# Patient Record
Sex: FEMALE | Race: BLACK OR AFRICAN AMERICAN | Employment: UNEMPLOYED | URBAN - METROPOLITAN AREA
[De-identification: names, ages, dates, MRNs, and addresses within clinical notes are randomized per-mention and may not be internally consistent; named-entity substitution may affect disease eponyms.]

---

## 2017-02-13 ENCOUNTER — OFFICE VISIT (OUTPATIENT)
Dept: FAMILY MEDICINE CLINIC | Facility: CLINIC | Age: 52
End: 2017-02-13

## 2017-02-13 ENCOUNTER — TELEPHONE (OUTPATIENT)
Dept: FAMILY MEDICINE CLINIC | Facility: CLINIC | Age: 52
End: 2017-02-13

## 2017-02-13 VITALS
BODY MASS INDEX: 31 KG/M2 | RESPIRATION RATE: 18 BRPM | TEMPERATURE: 98 F | DIASTOLIC BLOOD PRESSURE: 84 MMHG | SYSTOLIC BLOOD PRESSURE: 125 MMHG | HEART RATE: 89 BPM | WEIGHT: 189 LBS

## 2017-02-13 DIAGNOSIS — J01.00 ACUTE MAXILLARY SINUSITIS, RECURRENCE NOT SPECIFIED: Primary | ICD-10-CM

## 2017-02-13 PROCEDURE — 99213 OFFICE O/P EST LOW 20 MIN: CPT | Performed by: FAMILY MEDICINE

## 2017-02-13 PROCEDURE — 99212 OFFICE O/P EST SF 10 MIN: CPT | Performed by: FAMILY MEDICINE

## 2017-02-13 RX ORDER — BENZONATATE 200 MG/1
200 CAPSULE ORAL 3 TIMES DAILY PRN
Qty: 30 CAPSULE | Refills: 0 | Status: SHIPPED | OUTPATIENT
Start: 2017-02-13 | End: 2017-03-18

## 2017-02-13 RX ORDER — FLUCONAZOLE 150 MG/1
150 TABLET ORAL ONCE
Qty: 2 TABLET | Refills: 0 | Status: SHIPPED | OUTPATIENT
Start: 2017-02-13 | End: 2017-02-13

## 2017-02-13 RX ORDER — ZOLPIDEM TARTRATE 5 MG/1
5 TABLET ORAL NIGHTLY PRN
Qty: 30 TABLET | Refills: 0 | Status: SHIPPED
Start: 2017-02-13 | End: 2019-06-22

## 2017-02-13 RX ORDER — TRAMADOL HYDROCHLORIDE 50 MG/1
50 TABLET ORAL EVERY 6 HOURS PRN
COMMUNITY
End: 2017-03-18

## 2017-02-13 RX ORDER — AMOXICILLIN AND CLAVULANATE POTASSIUM 875; 125 MG/1; MG/1
1 TABLET, FILM COATED ORAL 2 TIMES DAILY
Qty: 20 TABLET | Refills: 0 | Status: SHIPPED | OUTPATIENT
Start: 2017-02-13 | End: 2017-02-23

## 2017-02-13 RX ORDER — TRAMADOL HYDROCHLORIDE 50 MG/1
50 TABLET ORAL EVERY 6 HOURS PRN
Qty: 30 TABLET | Refills: 0 | OUTPATIENT
Start: 2017-02-13 | End: 2017-03-18

## 2017-02-13 NOTE — PROGRESS NOTES
HPI: Marcellus Chance is a 46year old female who presents for one week of pharyngitis, hoarseness, sinus pressure and pain. Has hot flashes and chills. Tmax- 101. Taking sinus medication and OTC flu relief without any relief. No vomiting or diarrhea.  Decreased 05/01/2001  HEENT: Conjunctive clear. Prem ear canals clear. Perm TMs intact with good landmarks noted. Nares patent. Moderate nasal turbinate hypertrophy noted. Tenderness noted to frontal sinus and right maxillary sinus.   Oral mucous membrane moist.  No

## 2017-03-03 ENCOUNTER — TELEPHONE (OUTPATIENT)
Dept: FAMILY MEDICINE CLINIC | Facility: CLINIC | Age: 52
End: 2017-03-03

## 2017-03-03 NOTE — TELEPHONE ENCOUNTER
Pt returned call. Reviewed prescription information per doctor's instructions. Pt agreed with plan of care and will call back if no improvement in symptoms.

## 2017-03-03 NOTE — TELEPHONE ENCOUNTER
Dr. Rahul Campbell: Patient would like to know if you can prescribe medication for hot flashes. She states her hot flashes are worse now; unable to sleep, has sweats; unbearable. She states black Cohosh no longer helps her.   She has tried black cohosh in past when sh

## 2017-03-03 NOTE — TELEPHONE ENCOUNTER
Pt calling regarding what can she do for her hot flashes. Pt would like to know if medication can be prescribed. .. please advise

## 2017-03-16 ENCOUNTER — TELEPHONE (OUTPATIENT)
Dept: FAMILY MEDICINE CLINIC | Facility: CLINIC | Age: 52
End: 2017-03-16

## 2017-03-16 DIAGNOSIS — Z84.81 FAMILY HISTORY OF BREAST CANCER GENE MUTATION IN FIRST DEGREE RELATIVE: Primary | ICD-10-CM

## 2017-03-16 NOTE — TELEPHONE ENCOUNTER
Pt states medication that was prescribed High blood pressure patches is causing her Nausea, vomiting,Fatigue, Blurred vision,dry mouth,and joint pain. Pt states she took off patch about an hour ago, hoping it will make her feel better. Please advise.

## 2017-03-16 NOTE — TELEPHONE ENCOUNTER
Actions Requested: pt experiencing side effects of Clonidine patch, pt declined recommendations to go to Urgent Care  Situation/Background   Problem: pt states she has side effects from Clonidine patch prescribed for hot flashes   Onset: 1 week after miki

## 2017-03-16 NOTE — TELEPHONE ENCOUNTER
Spoke with patient and I agree with discontinue of clonidine patch secondary to the myriad of side effects. Patient advised to call first thing in the morning if symptoms have not lessened. Patient understood and agreed to plan.

## 2017-03-17 NOTE — TELEPHONE ENCOUNTER
Great! Hold on any therapy for now. I will consult with gyne and see what the best option is. In the meantime ask if there is any 1st degree breast CA history in her family.

## 2017-03-17 NOTE — TELEPHONE ENCOUNTER
Pt states she is feeling a lot better, symptoms have lessened. Pt is asking if she should estrogen for her hot flashes, states she is still having really bad hot flashes. Please advise.

## 2017-03-18 ENCOUNTER — OFFICE VISIT (OUTPATIENT)
Dept: INTERNAL MEDICINE CLINIC | Facility: CLINIC | Age: 52
End: 2017-03-18

## 2017-03-18 VITALS
WEIGHT: 189 LBS | HEART RATE: 67 BPM | TEMPERATURE: 98 F | SYSTOLIC BLOOD PRESSURE: 120 MMHG | RESPIRATION RATE: 18 BRPM | HEIGHT: 65 IN | BODY MASS INDEX: 31.49 KG/M2 | DIASTOLIC BLOOD PRESSURE: 82 MMHG

## 2017-03-18 DIAGNOSIS — E78.5 HYPERLIPIDEMIA, UNSPECIFIED HYPERLIPIDEMIA TYPE: Primary | ICD-10-CM

## 2017-03-18 DIAGNOSIS — Z00.00 PHYSICAL EXAM, ROUTINE: ICD-10-CM

## 2017-03-18 DIAGNOSIS — Z12.39 SCREENING FOR BREAST CANCER: ICD-10-CM

## 2017-03-18 DIAGNOSIS — N89.8 VAGINAL DISCHARGE: ICD-10-CM

## 2017-03-18 LAB
APPEARANCE: CLEAR
CHOLEST SERPL-MCNC: 184 MG/DL (ref 110–200)
HDLC SERPL-MCNC: 53 MG/DL
LDLC SERPL CALC-MCNC: 119 MG/DL (ref 0–99)
MULTISTIX LOT#: NORMAL NUMERIC
NONHDLC SERPL-MCNC: 131 MG/DL
PH, URINE: 5.5 (ref 4.5–8)
SPECIFIC GRAVITY: 1.02 (ref 1–1.03)
TRIGL SERPL-MCNC: 59 MG/DL (ref 1–149)
URINE-COLOR: YELLOW
UROBILINOGEN,SEMI-QN: 0.2 MG/DL (ref 0–1.9)

## 2017-03-18 PROCEDURE — 99386 PREV VISIT NEW AGE 40-64: CPT | Performed by: INTERNAL MEDICINE

## 2017-03-18 PROCEDURE — 81002 URINALYSIS NONAUTO W/O SCOPE: CPT | Performed by: INTERNAL MEDICINE

## 2017-03-18 PROCEDURE — 36415 COLL VENOUS BLD VENIPUNCTURE: CPT | Performed by: INTERNAL MEDICINE

## 2017-03-18 RX ORDER — POTASSIUM CHLORIDE 20 MEQ/1
TABLET, EXTENDED RELEASE ORAL
Refills: 0 | Status: CANCELLED | OUTPATIENT
Start: 2017-03-18

## 2017-03-18 RX ORDER — ZOLPIDEM TARTRATE 5 MG/1
5 TABLET ORAL NIGHTLY PRN
Qty: 30 TABLET | Refills: 0 | Status: CANCELLED | OUTPATIENT
Start: 2017-03-18

## 2017-03-18 RX ORDER — PANTOPRAZOLE SODIUM 40 MG/1
40 TABLET, DELAYED RELEASE ORAL
Qty: 90 TABLET | Refills: 2 | Status: SHIPPED | OUTPATIENT
Start: 2017-03-18 | End: 2017-08-31

## 2017-03-18 RX ORDER — METRONIDAZOLE 500 MG/1
500 TABLET ORAL 2 TIMES DAILY
Qty: 14 TABLET | Refills: 0 | Status: SHIPPED | OUTPATIENT
Start: 2017-03-18 | End: 2017-03-25

## 2017-03-18 RX ORDER — TRAMADOL HYDROCHLORIDE 50 MG/1
50 TABLET ORAL EVERY 8 HOURS PRN
Qty: 30 TABLET | Refills: 0 | Status: SHIPPED | OUTPATIENT
Start: 2017-03-18 | End: 2018-02-02

## 2017-03-18 NOTE — PATIENT INSTRUCTIONS
Prevention Guidelines, Women Ages 48 to 59  Screening tests and vaccines are an important part of managing your health. Health counseling is essential, too. Below are guidelines for these, for women ages 48 to 59.  Talk with your healthcare provider to ma Lung cancer Adults age 54 to [de-identified] who have smoked Yearly screening in smokers with 30 pack-year history of smoking or who quit within 15 years   Obesity All women in this age group At routine exams   Osteoporosis Women who are postmenopausal Ask your healthc PPSV23: 1 to 2 doses through age 59, or 1 dose at 72 or older (protects against 23 types of pneumococcal bacteria)   Tetanus/diphtheria/pertussis (Td/Tdap) booster All women in this age group Td every 10 years, or a one-time dose of Tdap instead of a Td kenji

## 2017-03-18 NOTE — TELEPHONE ENCOUNTER
Pt advised  to hold therapy for now. Pt reports that her grandmother, mother and sister all had breast cancer.

## 2017-03-18 NOTE — PROGRESS NOTES
HPI:   Wilber Segal is a 46year old female who presents for a complete physical exam. Symptoms:she complains of vaginal -discharge for last few weeks, white /yellow in color , not associated with itchiness. She is sexually active with one partner  She dos Oral Chew Tab Chew 81 mg by mouth daily. Disp:  Rfl:    aspirin-acetaminophen-caffeine (EXCEDRIN MIGRAINE) 250-250-65 MG Oral Tab Take 1 tablet by mouth every 6 (six) hours as needed for Pain.  Disp:  Rfl:       Past Medical History   Diagnosis Date   • Satish Dysuria, hematuria, urinary incontinence. Menses regular, not heavy. Endocrine Negative Cold intolerance and heat intolerance. Neuro Negative Dizziness, extremity weakness, headache, memory impairment, numbness in extremities, seizures and tremors.    P ASSESSMENT AND PLAN:   Delta Bernstein is a 46year old female who presents for a complete physical exam.  Self breast exam explained.      Insomnia - advised to stop using ambien, discussed about relaxation techniques, she can use melatonin as needed

## 2017-03-19 NOTE — TELEPHONE ENCOUNTER
You can call patient and asked if she was advised to get BRCA testing in lieu of 1st degree relative CA and if so what was the result.

## 2017-03-20 LAB
GENITAL VAGINOSIS SCREEN: POSITIVE
TRICHOMONAS SCREEN: NEGATIVE

## 2017-03-20 NOTE — TELEPHONE ENCOUNTER
FJRPlease advise follow up therapy for menopausal hot flashes, further testing or referral    Patient discontinued Clonidine patch 5 days ago due to side effects.  Continues to experience body aches, shoulders, legs 8/10 relieved with otc 'powder BC'  There

## 2017-03-20 NOTE — TELEPHONE ENCOUNTER
Referral on chart for genetic testing regarding family history of breast CA. Call patient. There is another non hormonal medication that we can try but I highly suggest she sees genetic counselor prior to any other therapy.

## 2017-08-30 ENCOUNTER — TELEPHONE (OUTPATIENT)
Dept: GASTROENTEROLOGY | Facility: CLINIC | Age: 52
End: 2017-08-30

## 2017-08-30 DIAGNOSIS — R10.10 PAIN OF UPPER ABDOMEN: Primary | ICD-10-CM

## 2017-08-30 NOTE — TELEPHONE ENCOUNTER
Dr Dickinson--pt contacted. Has had bloating, nausea, reflux for past 3 weeks--also ran out of pantoprazole 3 weeks ago, but did not call. You did egd/flex sig 5/18/15 found hemorrhoids, decreased vascular markings in sigmoid/rectal area, mild gastritis.  Norm

## 2017-08-30 NOTE — TELEPHONE ENCOUNTER
Pt states that she feels full and bloated after eating and sometimes even after drinking water. Pt states she gets nauseous also. Pt would like to know when she is due for next procedure. Please call.

## 2017-08-31 RX ORDER — PANTOPRAZOLE SODIUM 40 MG/1
40 TABLET, DELAYED RELEASE ORAL
Qty: 90 TABLET | Refills: 2 | Status: SHIPPED | OUTPATIENT
Start: 2017-08-31 | End: 2018-07-10

## 2017-08-31 NOTE — TELEPHONE ENCOUNTER
The patient was contacted and we discussed her symptoms. She has been experiencing nausea, reflux disease and bloating. She ran out of her Protonix about 3 weeks ago and that is when the symptoms seem to start up.   She does take Excedrin Migraine quite r

## 2017-09-01 ENCOUNTER — TELEPHONE (OUTPATIENT)
Dept: GASTROENTEROLOGY | Facility: CLINIC | Age: 52
End: 2017-09-01

## 2017-09-01 NOTE — TELEPHONE ENCOUNTER
LMTCB - all I see us a gallbladder US ordered not performed    -no notes that pt has seen a surgeon  -time off work would need to come from surgeon actually doing the surgery, not GI referring provider  -please inform pt to do [address] as ordered and she can dis

## 2017-09-01 NOTE — TELEPHONE ENCOUNTER
Pt called back. Has ultrasound of GB scheduled on 9/16. Again wants to know If Dr Anahi Mitchell decides to do surgery how long it would it take.   I reviewed Dr Anahi Mitchell is not a surgeon and he would be referring her to a general surgeon to have this done IF necessary

## 2017-11-18 ENCOUNTER — HOSPITAL ENCOUNTER (OUTPATIENT)
Age: 52
Discharge: HOME | End: 2017-11-18
Attending: PEDIATRICS
Payer: COMMERCIAL

## 2017-11-18 VITALS
WEIGHT: 192 LBS | HEART RATE: 106 BPM | RESPIRATION RATE: 20 BRPM | TEMPERATURE: 98 F | OXYGEN SATURATION: 98 % | DIASTOLIC BLOOD PRESSURE: 87 MMHG | HEIGHT: 65 IN | BODY MASS INDEX: 31.99 KG/M2 | SYSTOLIC BLOOD PRESSURE: 140 MMHG

## 2017-11-18 DIAGNOSIS — J01.90 ACUTE SINUSITIS, RECURRENCE NOT SPECIFIED, UNSPECIFIED LOCATION: Primary | ICD-10-CM

## 2017-11-18 DIAGNOSIS — G43.809 OTHER MIGRAINE WITHOUT STATUS MIGRAINOSUS, NOT INTRACTABLE: ICD-10-CM

## 2017-11-18 PROCEDURE — 99213 OFFICE O/P EST LOW 20 MIN: CPT

## 2017-11-18 PROCEDURE — 99204 OFFICE O/P NEW MOD 45 MIN: CPT

## 2017-11-18 RX ORDER — TRAMADOL HYDROCHLORIDE 50 MG/1
TABLET ORAL EVERY 4 HOURS PRN
Qty: 10 TABLET | Refills: 0 | Status: SHIPPED | OUTPATIENT
Start: 2017-11-18 | End: 2017-11-25

## 2017-11-18 RX ORDER — AMOXICILLIN AND CLAVULANATE POTASSIUM 875; 125 MG/1; MG/1
1 TABLET, FILM COATED ORAL 2 TIMES DAILY
Qty: 20 TABLET | Refills: 0 | Status: SHIPPED | OUTPATIENT
Start: 2017-11-18 | End: 2017-11-28

## 2017-11-18 RX ORDER — FLUCONAZOLE 150 MG/1
150 TABLET ORAL ONCE
Qty: 2 TABLET | Refills: 0 | Status: SHIPPED | OUTPATIENT
Start: 2017-11-18 | End: 2017-11-18

## 2017-11-18 NOTE — ED PROVIDER NOTES
Patient presents with:  Cough/URI      HPI:     Megan Serrano is a 46year old female who presents for evaluation of a chief complaint of upper respiratory symptoms for over a week. She notes some cough, headache, face pain.   She notes green nasal disch (DIFLUCAN) 150 MG Oral Tab          Sig: Take 1 tablet (150 mg total) by mouth once.  May repeat once 3 days later          Dispense:  2 tablet          Refill:  0      TraMADol HCl 50 MG Oral Tab          Sig: Take 1-2 tablets ( mg total) by mouth ev

## 2017-11-18 NOTE — ED NOTES
Pt given discharge instructions and prescriptions, verbalizes understanding. Pt instructed that pain medication may cause drowsiness and pt is not to drive, drink alcohol, or participate in activities requiring full attention, verbalizes understanding.  Lobo Zamora

## 2017-11-18 NOTE — ED NOTES
Pt reports headache and productive cough for the last several days. Reports frontal headache is about 10/10. Has not had any over the counter pain medication today. Denies fevers, nausea, vomiting.  Reports sore throat earlier this week but states it has im

## 2017-12-08 RX ORDER — SIMVASTATIN 40 MG
40 TABLET ORAL NIGHTLY
Qty: 90 TABLET | Refills: 0 | Status: SHIPPED | OUTPATIENT
Start: 2017-12-08 | End: 2018-04-02

## 2017-12-08 NOTE — TELEPHONE ENCOUNTER
Chart reviewed, simvastatin 40 mg refilled for 3 months per [facility] refill protocol.     Cholesterol Medications  Protocol Criteria:  · Appointment scheduled in the past 12 months or in the next 3 months  · ALT & LDL on file in the past 12 months  · ALT result

## 2018-01-18 ENCOUNTER — TELEPHONE (OUTPATIENT)
Dept: INTERNAL MEDICINE CLINIC | Facility: CLINIC | Age: 53
End: 2018-01-18

## 2018-01-18 RX ORDER — NAPROXEN 500 MG/1
500 TABLET ORAL 2 TIMES DAILY WITH MEALS
Qty: 30 TABLET | Refills: 0 | Status: SHIPPED | OUTPATIENT
Start: 2018-01-18 | End: 2019-02-11

## 2018-01-18 NOTE — TELEPHONE ENCOUNTER
Spoke with Dr. Es Lisa has not seen patient since March 2017. Would like patient to try Naproxen 500 mg twice a day alternate with Excedrin  If not improvement should go to ER.

## 2018-01-18 NOTE — TELEPHONE ENCOUNTER
Spoke with patient informed her of Dr. Ribeiro Lesser response to try Naproxen 500 mg twice a day alternate with Excedrin. If not improvement should go to ER.   Patient states had tried Naproxen already she had Rx from dental work few months ago, and no relief wi

## 2018-01-18 NOTE — TELEPHONE ENCOUNTER
Patient called c/o Migrane headaches daily on and off for past week. Was hoping to wait until appt on Saturday 1/20/18  Headache is 8 on pain scale and excedrin migrane not helping at all. Asking for refill on Tramadol   Has appt 1/20  Please advise.

## 2018-01-23 ENCOUNTER — NURSE TRIAGE (OUTPATIENT)
Dept: OTHER | Age: 53
End: 2018-01-23

## 2018-01-23 RX ORDER — ZOLPIDEM TARTRATE 10 MG/1
10 TABLET ORAL NIGHTLY PRN
Qty: 30 TABLET | Refills: 0 | Status: SHIPPED
Start: 2018-01-23 | End: 2018-04-24

## 2018-01-23 NOTE — TELEPHONE ENCOUNTER
Action Requested: Summary for Provider     []  Critical Lab, Recommendations Needed  [x] Need Additional Advice  []   FYI    []   Need Orders  [x] Need Medications Sent to Pharmacy  []  Other     SUMMARY: pt asking if doctor can prescribe something to help

## 2018-02-02 ENCOUNTER — NURSE TRIAGE (OUTPATIENT)
Dept: OTHER | Age: 53
End: 2018-02-02

## 2018-02-02 ENCOUNTER — OFFICE VISIT (OUTPATIENT)
Dept: FAMILY MEDICINE CLINIC | Facility: CLINIC | Age: 53
End: 2018-02-02

## 2018-02-02 VITALS
SYSTOLIC BLOOD PRESSURE: 135 MMHG | DIASTOLIC BLOOD PRESSURE: 77 MMHG | WEIGHT: 199 LBS | RESPIRATION RATE: 17 BRPM | HEIGHT: 65 IN | BODY MASS INDEX: 33.15 KG/M2 | HEART RATE: 80 BPM | TEMPERATURE: 98 F

## 2018-02-02 DIAGNOSIS — N76.0 BACTERIAL VAGINOSIS: ICD-10-CM

## 2018-02-02 DIAGNOSIS — B96.89 BACTERIAL VAGINOSIS: ICD-10-CM

## 2018-02-02 DIAGNOSIS — E78.5 HYPERLIPIDEMIA, UNSPECIFIED HYPERLIPIDEMIA TYPE: ICD-10-CM

## 2018-02-02 DIAGNOSIS — R68.89 FLU-LIKE SYMPTOMS: Primary | ICD-10-CM

## 2018-02-02 LAB
CONTROL LINE PRESENT WITH A CLEAR BACKGROUND (YES/NO): YES YES/NO
KIT LOT #: NORMAL NUMERIC
STREP GRP A CUL-SCR: NEGATIVE

## 2018-02-02 PROCEDURE — 87880 STREP A ASSAY W/OPTIC: CPT | Performed by: FAMILY MEDICINE

## 2018-02-02 PROCEDURE — 99213 OFFICE O/P EST LOW 20 MIN: CPT | Performed by: FAMILY MEDICINE

## 2018-02-02 PROCEDURE — 99212 OFFICE O/P EST SF 10 MIN: CPT | Performed by: FAMILY MEDICINE

## 2018-02-02 RX ORDER — METRONIDAZOLE 500 MG/1
500 TABLET ORAL 2 TIMES DAILY
Qty: 14 TABLET | Refills: 0 | Status: SHIPPED | OUTPATIENT
Start: 2018-02-02 | End: 2018-04-24

## 2018-02-02 RX ORDER — TRAMADOL HYDROCHLORIDE 50 MG/1
50 TABLET ORAL EVERY 6 HOURS PRN
Qty: 30 TABLET | Refills: 1 | Status: SHIPPED | OUTPATIENT
Start: 2018-02-02 | End: 2018-04-09

## 2018-02-02 NOTE — TELEPHONE ENCOUNTER
Action Requested: Summary for Provider     []  Critical Lab, Recommendations Needed  [] Need Additional Advice  []   FYI    []   Need Orders  [] Need Medications Sent to Pharmacy  []  Other     SUMMARY: Appointment made with Dr Heather Echeverria today 2/2/18 at [phone]

## 2018-02-02 NOTE — PROGRESS NOTES
HPI:    Patient ID: Clary Salinas is a 46year old female. Sore Throat    This is a new problem. The current episode started yesterday. The problem has been unchanged. Neither side of throat is experiencing more pain than the other.  There has been no aspirin-acetaminophen-caffeine (EXCEDRIN MIGRAINE) 250-250-65 MG Oral Tab Take 1 tablet by mouth every 6 (six) hours as needed for Pain.  Disp:  Rfl:      Allergies:No Known Allergies   PHYSICAL EXAM:   Physical Exam   Constitutional: She appears well-dev tablet (500 mg total) by mouth 2 (two) times daily. TraMADol HCl 50 MG Oral Tab 30 tablet 1      Sig: Take 1 tablet (50 mg total) by mouth every 6 (six) hours as needed for Pain.            Imaging & Referrals:  None       OI#[id number]

## 2018-02-25 RX ORDER — SIMVASTATIN 40 MG
40 TABLET ORAL NIGHTLY
Qty: 90 TABLET | Refills: 0 | OUTPATIENT
Start: 2018-02-25

## 2018-04-02 RX ORDER — SIMVASTATIN 40 MG
40 TABLET ORAL NIGHTLY
Qty: 90 TABLET | Refills: 0 | Status: SHIPPED | OUTPATIENT
Start: 2018-04-02 | End: 2018-07-10

## 2018-04-03 ENCOUNTER — TELEPHONE (OUTPATIENT)
Dept: FAMILY MEDICINE CLINIC | Facility: CLINIC | Age: 53
End: 2018-04-03

## 2018-04-03 NOTE — TELEPHONE ENCOUNTER
Patient asking if she can be added on a Saturday in April for [facility] PAP with Dr Royce Arthur. Due to her work schedule she can only come in on a Saturday.

## 2018-04-09 ENCOUNTER — NURSE TRIAGE (OUTPATIENT)
Dept: OTHER | Age: 53
End: 2018-04-09

## 2018-04-09 RX ORDER — TRAMADOL HYDROCHLORIDE 50 MG/1
50 TABLET ORAL EVERY 6 HOURS PRN
Qty: 30 TABLET | Refills: 0 | OUTPATIENT
Start: 2018-04-09 | End: 2018-07-10

## 2018-04-09 NOTE — TELEPHONE ENCOUNTER
LMTCB first date available for a 30 min opening with Dr. Kamilah Adame is May 26th. Pt wants a Saturday appt. pls see msg below.

## 2018-04-09 NOTE — TELEPHONE ENCOUNTER
Okay to add if 30 minute appointment available or for 15 minute in the last slot, otherwise offered May appointment

## 2018-04-09 NOTE — TELEPHONE ENCOUNTER
Tramadol 50 mg called Lilli (pharmacist) at [facility].     Left message on patient's voicemail informing her script called into pharmacy.

## 2018-04-09 NOTE — TELEPHONE ENCOUNTER
Per patient, she takes  TraMADol HCl 50 MG Oral Tab 1 tablet every 6 hours as needed for pain.      Dr. Tova Sinclair:  See pended script

## 2018-04-09 NOTE — TELEPHONE ENCOUNTER
Action Requested: Summary for Provider     []  Critical Lab, Recommendations Needed  [] Need Additional Advice  []   FYI    []   Need Orders  [] Need Medications Sent to Pharmacy  []  Other     SUMMARY:    Asking for medication.   Cannot come in for she can

## 2018-04-26 RX ORDER — METRONIDAZOLE 500 MG/1
500 TABLET ORAL 2 TIMES DAILY
Qty: 14 TABLET | Refills: 0 | Status: SHIPPED | OUTPATIENT
Start: 2018-04-26 | End: 2018-07-10

## 2018-04-26 NOTE — TELEPHONE ENCOUNTER
Please advise on refill request.     Recent Outpatient Visits            2 months ago Flu-like symptoms    Nikki Norwood, [facility] MD [place]    Office Visit    1 year ago Hyperlipidemia, unspecified hyperlipidemia type    Galindo OJEDA

## 2018-04-27 RX ORDER — ZOLPIDEM TARTRATE 10 MG/1
TABLET ORAL
Qty: 30 TABLET | Refills: 0 | OUTPATIENT
Start: 2018-04-27 | End: 2019-06-22

## 2018-04-27 NOTE — TELEPHONE ENCOUNTER
LOV: 2-2-18 Last Rx: 1-23-18    Please advise in regards to refill request. Thank You    Please respond to pool: ARIN JONES OPO LPN/CMA

## 2018-07-10 RX ORDER — TRAMADOL HYDROCHLORIDE 50 MG/1
50 TABLET ORAL EVERY 6 HOURS PRN
Qty: 30 TABLET | Refills: 0 | Status: SHIPPED
Start: 2018-07-10 | End: 2019-06-22

## 2018-07-10 RX ORDER — PANTOPRAZOLE SODIUM 40 MG/1
40 TABLET, DELAYED RELEASE ORAL
Qty: 30 TABLET | Refills: 2 | Status: SHIPPED | OUTPATIENT
Start: 2018-07-10 | End: 2019-01-06

## 2018-07-10 RX ORDER — SIMVASTATIN 40 MG
40 TABLET ORAL NIGHTLY
Qty: 30 TABLET | Refills: 2 | Status: SHIPPED | OUTPATIENT
Start: 2018-07-10 | End: 2019-06-22 | Stop reason: DRUGHIGH

## 2018-07-10 RX ORDER — METRONIDAZOLE 500 MG/1
500 TABLET ORAL 2 TIMES DAILY
Qty: 14 TABLET | Refills: 0 | Status: SHIPPED | OUTPATIENT
Start: 2018-07-10 | End: 2018-07-17

## 2018-07-10 NOTE — TELEPHONE ENCOUNTER
Please advise on refill request.   Patient stated headaches have returned. Also complaining of clear discharge with a fishy odor.    Patient has had bacterial vaginosis in the past and is sure she has another infection    Cholesterol Medications  Protocol Office Visit          Refill Protocol Appointment Criteria  · Appointment scheduled in the past 12 months or in the next 3 months  Recent Outpatient Visits            5 months ago Flu-like symptoms    [address] [place] [address], [facility], [place],

## 2018-07-10 NOTE — TELEPHONE ENCOUNTER
Pt called to follow up on Tramadol refill request, states pharmacy hasn't received prescription yet. Noted refill approved today, informed pt prescription will be called in to pharmacy and pharmacy will notify her when ready for .  Tramadol refill in

## 2018-10-08 ENCOUNTER — TELEPHONE (OUTPATIENT)
Dept: GASTROENTEROLOGY | Facility: CLINIC | Age: 53
End: 2018-10-08

## 2018-12-27 NOTE — TELEPHONE ENCOUNTER
Pt is requesting a call from [address] regarding how long would she be out of work if she was to have the gallbladder surgery. Pt is aware PL is out of the office today.  Please Call Thank You No

## 2019-01-07 RX ORDER — PANTOPRAZOLE SODIUM 40 MG/1
TABLET, DELAYED RELEASE ORAL
Qty: 30 TABLET | Refills: 0 | Status: SHIPPED | OUTPATIENT
Start: 2019-01-07 | End: 2019-02-11

## 2019-01-07 NOTE — TELEPHONE ENCOUNTER
No future appointments.       Refill Protocol Appointment Criteria  · Appointment scheduled in the past 12 months or in the next 3 months  Recent Outpatient Visits            11 months ago Flu-like symptoms    [address] [facility]

## 2019-02-07 ENCOUNTER — TELEPHONE (OUTPATIENT)
Dept: GASTROENTEROLOGY | Facility: CLINIC | Age: 54
End: 2019-02-07

## 2019-02-07 NOTE — TELEPHONE ENCOUNTER
Pt states she has been experiencing pain \"in the pit of my stomach\" for two weeks. Pt states it is to the point where she doesn't want to eat.  Please call thank you [phone]

## 2019-02-07 NOTE — TELEPHONE ENCOUNTER
Pt contacted, last seen 6/16/15 for colonoscopy. She now has [facility] --informed the clinic/emh do not accept [place]. Instructed to contact [facility] today and find out which PCP and GI and hospital are in her network.  I will be happy to send GI records t

## 2019-02-11 ENCOUNTER — NURSE TRIAGE (OUTPATIENT)
Dept: FAMILY MEDICINE CLINIC | Facility: CLINIC | Age: 54
End: 2019-02-11

## 2019-02-11 ENCOUNTER — HOSPITAL ENCOUNTER (EMERGENCY)
Age: 54
Discharge: HOME | End: 2019-02-11
Attending: EMERGENCY MEDICINE
Payer: MEDICAID

## 2019-02-11 ENCOUNTER — APPOINTMENT (OUTPATIENT)
Dept: ULTRASOUND IMAGING | Age: 54
End: 2019-02-11
Attending: EMERGENCY MEDICINE
Payer: MEDICAID

## 2019-02-11 VITALS
TEMPERATURE: 98 F | OXYGEN SATURATION: 99 % | BODY MASS INDEX: 31 KG/M2 | RESPIRATION RATE: 16 BRPM | SYSTOLIC BLOOD PRESSURE: 121 MMHG | HEART RATE: 78 BPM | WEIGHT: 188 LBS | DIASTOLIC BLOOD PRESSURE: 71 MMHG

## 2019-02-11 DIAGNOSIS — K21.9 GASTROESOPHAGEAL REFLUX DISEASE, ESOPHAGITIS PRESENCE NOT SPECIFIED: Primary | ICD-10-CM

## 2019-02-11 LAB
ALBUMIN SERPL-MCNC: 3.8 G/DL (ref 3.1–4.5)
ALBUMIN/GLOB SERPL: 1.1 {RATIO} (ref 1–2)
ALP LIVER SERPL-CCNC: 87 U/L (ref 41–108)
ALT SERPL-CCNC: 27 U/L (ref 14–54)
ANION GAP SERPL CALC-SCNC: 5 MMOL/L (ref 0–18)
AST SERPL-CCNC: 15 U/L (ref 15–41)
BASOPHILS # BLD AUTO: 0.04 X10(3) UL (ref 0–0.2)
BASOPHILS NFR BLD AUTO: 0.5 %
BILIRUB SERPL-MCNC: 0.4 MG/DL (ref 0.1–2)
BILIRUB UR QL STRIP.AUTO: NEGATIVE
BUN BLD-MCNC: 15 MG/DL (ref 8–20)
BUN/CREAT SERPL: 15.2 (ref 10–20)
CALCIUM BLD-MCNC: 9.1 MG/DL (ref 8.3–10.3)
CHLORIDE SERPL-SCNC: 105 MMOL/L (ref 101–111)
CLARITY UR REFRACT.AUTO: CLEAR
CO2 SERPL-SCNC: 30 MMOL/L (ref 22–32)
COLOR UR AUTO: YELLOW
CREAT BLD-MCNC: 0.99 MG/DL (ref 0.55–1.02)
DEPRECATED RDW RBC AUTO: 41 FL (ref 35.1–46.3)
EOSINOPHIL # BLD AUTO: 0.16 X10(3) UL (ref 0–0.7)
EOSINOPHIL NFR BLD AUTO: 2 %
ERYTHROCYTE [DISTWIDTH] IN BLOOD BY AUTOMATED COUNT: 13.3 % (ref 11–15)
GLOBULIN PLAS-MCNC: 3.6 G/DL (ref 2.8–4.4)
GLUCOSE BLD-MCNC: 86 MG/DL (ref 70–99)
GLUCOSE UR STRIP.AUTO-MCNC: NEGATIVE MG/DL
HCT VFR BLD AUTO: 44.1 % (ref 35–48)
HGB BLD-MCNC: 13.7 G/DL (ref 12–16)
IMM GRANULOCYTES # BLD AUTO: 0.02 X10(3) UL (ref 0–1)
IMM GRANULOCYTES NFR BLD: 0.2 %
LEUKOCYTE ESTERASE UR QL STRIP.AUTO: NEGATIVE
LIPASE: 197 U/L (ref 73–393)
LYMPHOCYTES # BLD AUTO: 2.98 X10(3) UL (ref 1–4)
LYMPHOCYTES NFR BLD AUTO: 36.3 %
M PROTEIN MFR SERPL ELPH: 7.4 G/DL (ref 6.4–8.2)
MCH RBC QN AUTO: 25.9 PG (ref 26–34)
MCHC RBC AUTO-ENTMCNC: 31.1 G/DL (ref 31–37)
MCV RBC AUTO: 83.5 FL (ref 80–100)
MONOCYTES # BLD AUTO: 0.54 X10(3) UL (ref 0.1–1)
MONOCYTES NFR BLD AUTO: 6.6 %
NEUTROPHILS # BLD AUTO: 4.46 X10 (3) UL (ref 1.5–7.7)
NEUTROPHILS # BLD AUTO: 4.46 X10(3) UL (ref 1.5–7.7)
NEUTROPHILS NFR BLD AUTO: 54.4 %
NITRITE UR QL STRIP.AUTO: NEGATIVE
OSMOLALITY SERPL CALC.SUM OF ELEC: 290 MOSM/KG (ref 275–295)
PH UR STRIP.AUTO: 5.5 [PH] (ref 4.5–8)
PLATELET # BLD AUTO: 279 10(3)UL (ref 150–450)
POTASSIUM SERPL-SCNC: 3.7 MMOL/L (ref 3.6–5.1)
PROT UR STRIP.AUTO-MCNC: NEGATIVE MG/DL
RBC # BLD AUTO: 5.28 X10(6)UL (ref 3.8–5.3)
RBC UR QL AUTO: NEGATIVE
SODIUM SERPL-SCNC: 140 MMOL/L (ref 136–144)
SP GR UR STRIP.AUTO: >=1.03 (ref 1–1.03)
UROBILINOGEN UR STRIP.AUTO-MCNC: 0.2 MG/DL
WBC # BLD AUTO: 8.2 X10(3) UL (ref 4–11)

## 2019-02-11 PROCEDURE — 81003 URINALYSIS AUTO W/O SCOPE: CPT | Performed by: EMERGENCY MEDICINE

## 2019-02-11 PROCEDURE — 76700 US EXAM ABDOM COMPLETE: CPT | Performed by: EMERGENCY MEDICINE

## 2019-02-11 PROCEDURE — 83690 ASSAY OF LIPASE: CPT | Performed by: EMERGENCY MEDICINE

## 2019-02-11 PROCEDURE — 96374 THER/PROPH/DIAG INJ IV PUSH: CPT

## 2019-02-11 PROCEDURE — 85025 COMPLETE CBC W/AUTO DIFF WBC: CPT | Performed by: EMERGENCY MEDICINE

## 2019-02-11 PROCEDURE — S0028 INJECTION, FAMOTIDINE, 20 MG: HCPCS | Performed by: EMERGENCY MEDICINE

## 2019-02-11 PROCEDURE — 99284 EMERGENCY DEPT VISIT MOD MDM: CPT

## 2019-02-11 PROCEDURE — 96375 TX/PRO/DX INJ NEW DRUG ADDON: CPT

## 2019-02-11 PROCEDURE — 80053 COMPREHEN METABOLIC PANEL: CPT | Performed by: EMERGENCY MEDICINE

## 2019-02-11 RX ORDER — RANITIDINE 150 MG/1
150 TABLET ORAL EVERY 12 HOURS
Qty: 60 TABLET | Refills: 0 | Status: SHIPPED | OUTPATIENT
Start: 2019-02-11 | End: 2019-02-11 | Stop reason: CLARIF

## 2019-02-11 RX ORDER — PANTOPRAZOLE SODIUM 40 MG/1
40 TABLET, DELAYED RELEASE ORAL DAILY
Qty: 30 TABLET | Refills: 0 | Status: SHIPPED | OUTPATIENT
Start: 2019-02-11 | End: 2019-02-11 | Stop reason: CLARIF

## 2019-02-11 RX ORDER — FAMOTIDINE 10 MG/ML
20 INJECTION, SOLUTION INTRAVENOUS ONCE
Status: COMPLETED | OUTPATIENT
Start: 2019-02-11 | End: 2019-02-11

## 2019-02-11 RX ORDER — PANTOPRAZOLE SODIUM 40 MG/1
TABLET, DELAYED RELEASE ORAL
Qty: 30 TABLET | Refills: 0 | Status: SHIPPED | OUTPATIENT
Start: 2019-02-11 | End: 2019-06-22 | Stop reason: ALTCHOICE

## 2019-02-11 RX ORDER — ONDANSETRON 2 MG/ML
4 INJECTION INTRAMUSCULAR; INTRAVENOUS ONCE
Status: COMPLETED | OUTPATIENT
Start: 2019-02-11 | End: 2019-02-11

## 2019-02-11 RX ORDER — TRAMADOL HYDROCHLORIDE 50 MG/1
50 TABLET ORAL EVERY 6 HOURS PRN
Qty: 30 TABLET | Refills: 1 | OUTPATIENT
Start: 2019-02-11 | End: 2019-06-22 | Stop reason: ALTCHOICE

## 2019-02-11 RX ORDER — RANITIDINE 150 MG/1
150 TABLET ORAL 2 TIMES DAILY
Qty: 90 TABLET | Refills: 0 | Status: SHIPPED | OUTPATIENT
Start: 2019-02-11 | End: 2019-06-22

## 2019-02-11 RX ORDER — RANITIDINE 150 MG/1
150 TABLET ORAL 2 TIMES DAILY
Qty: 90 TABLET | Refills: 0 | Status: SHIPPED | OUTPATIENT
Start: 2019-02-11 | End: 2019-02-11

## 2019-02-11 RX ORDER — PANTOPRAZOLE SODIUM 40 MG/1
TABLET, DELAYED RELEASE ORAL
Qty: 30 TABLET | Refills: 0 | Status: SHIPPED | OUTPATIENT
Start: 2019-02-11 | End: 2019-02-11

## 2019-02-11 NOTE — TELEPHONE ENCOUNTER
Action Requested: Summary for Provider     []  Critical Lab, Recommendations Needed  [] Need Additional Advice  []   FYI    []   Need Orders  [] Need Medications Sent to Pharmacy  []  Other     SUMMARY:   Patient does not want to come in due to she does

## 2019-02-11 NOTE — ED INITIAL ASSESSMENT (HPI)
States upper abd pain and epigastric pain for two weeks.  States pain is worse after eating and states been out of her meds for last two weeks pain is burning and pressure like worse with eating

## 2019-02-11 NOTE — ED NOTES
Patient states she has been out of zantac and pantoprazole x 2 weeks. Moved to this area recently and has not established care with new PCP.

## 2019-02-11 NOTE — TELEPHONE ENCOUNTER
Patient aware scripts sent to pharmacy. Patient reqeusting script be sent to [facility] in [place].    Scripts for ranitidine and pantoprazole cancelled at [facility].   Cancelled call in script for Tramadol at [facility].     Tramadol called into [facility] prescribe

## 2019-02-11 NOTE — ED PROVIDER NOTES
Patient Seen in: THE [facility] In [place]    History   Patient presents with:  Abdomen/Flank Pain (GI/)    Stated Complaint: complains of epigastric pain for two weeks, out of meds, nausea, worse after ea*    HPI    Patient is a 53-yea O2 Device None (Room air)       Current:/71   Pulse 78   Temp 98.3 °F (36.8 °C) (Temporal)   Resp 16   Wt 85.3 kg   LMP 05/01/2001   SpO2 99%   BMI 31.28 kg/m²         Physical Exam   Constitutional: She is oriented to person, place, and time.  She cholelithiasis or sonographic evidence of cholecystitis. MDM   59-year-old female presenting with epigastric pain, radiating up into her chest into the back of her throat, [place] by nausea. This has been going on for a couple of weeks.   She has a hi

## 2019-03-11 ENCOUNTER — TELEPHONE (OUTPATIENT)
Dept: FAMILY MEDICINE CLINIC | Facility: CLINIC | Age: 54
End: 2019-03-11

## 2019-03-11 NOTE — TELEPHONE ENCOUNTER
Prior authorization needed from [employer] in [place]  Plan does not cover this medication   PANTOPRAZOLE 40 MG TABLETS  QTY: 30  TAKE 1 TABLET BY MOUTH EVERY MORNING BEFORE   BREAKFAST       PLease  CALL PLAN [phone] TO INITIATE PRIOR AUT

## 2019-03-11 NOTE — TELEPHONE ENCOUNTER
PA for Pantoprazole Sodium 40 mg tab completed with [facility] via CMM response time 3-5 business days, LA BERNARDOH.

## 2019-03-14 NOTE — TELEPHONE ENCOUNTER
PA approved for gap coverage only #30/30 effective 3/12/2019-3/31/2019; patient notified of the approval via [address]

## 2019-05-14 ENCOUNTER — APPOINTMENT (OUTPATIENT)
Dept: CT IMAGING | Facility: HOSPITAL | Age: 54
End: 2019-05-14
Attending: EMERGENCY MEDICINE
Payer: MEDICAID

## 2019-05-14 ENCOUNTER — HOSPITAL ENCOUNTER (EMERGENCY)
Facility: HOSPITAL | Age: 54
Discharge: HOME | End: 2019-05-14
Attending: EMERGENCY MEDICINE
Payer: MEDICAID

## 2019-05-14 VITALS
RESPIRATION RATE: 18 BRPM | SYSTOLIC BLOOD PRESSURE: 158 MMHG | HEIGHT: 65 IN | HEART RATE: 93 BPM | WEIGHT: 191 LBS | BODY MASS INDEX: 31.82 KG/M2 | TEMPERATURE: 98 F | DIASTOLIC BLOOD PRESSURE: 88 MMHG | OXYGEN SATURATION: 99 %

## 2019-05-14 DIAGNOSIS — M54.9 MID BACK PAIN: Primary | ICD-10-CM

## 2019-05-14 PROCEDURE — 74176 CT ABD & PELVIS W/O CONTRAST: CPT | Performed by: EMERGENCY MEDICINE

## 2019-05-14 PROCEDURE — 80048 BASIC METABOLIC PNL TOTAL CA: CPT | Performed by: EMERGENCY MEDICINE

## 2019-05-14 PROCEDURE — 85025 COMPLETE CBC W/AUTO DIFF WBC: CPT | Performed by: EMERGENCY MEDICINE

## 2019-05-14 PROCEDURE — 99284 EMERGENCY DEPT VISIT MOD MDM: CPT

## 2019-05-14 PROCEDURE — 96361 HYDRATE IV INFUSION ADD-ON: CPT

## 2019-05-14 PROCEDURE — 96374 THER/PROPH/DIAG INJ IV PUSH: CPT

## 2019-05-14 PROCEDURE — 81001 URINALYSIS AUTO W/SCOPE: CPT | Performed by: EMERGENCY MEDICINE

## 2019-05-14 RX ORDER — TRAMADOL HYDROCHLORIDE 50 MG/1
TABLET ORAL EVERY 6 HOURS PRN
Qty: 10 TABLET | Refills: 0 | Status: SHIPPED | OUTPATIENT
Start: 2019-05-14 | End: 2019-05-21

## 2019-05-14 RX ORDER — SODIUM CHLORIDE 9 MG/ML
125 INJECTION, SOLUTION INTRAVENOUS CONTINUOUS
Status: DISCONTINUED | OUTPATIENT
Start: 2019-05-14 | End: 2019-05-14

## 2019-05-14 RX ORDER — KETOROLAC TROMETHAMINE 30 MG/ML
30 INJECTION, SOLUTION INTRAMUSCULAR; INTRAVENOUS ONCE
Status: COMPLETED | OUTPATIENT
Start: 2019-05-14 | End: 2019-05-14

## 2019-05-14 NOTE — ED PROVIDER NOTES
Patient Seen in: [facility]    History   Patient presents with:  Abdomen/Flank Pain (GI/)    Stated Complaint: Flank Pain - Pt states kidney stones    HPI    17-year-old female with history of migraine headaches, hyperlipide vital signs reviewed. All other systems reviewed and negative except as noted above.     Physical Exam     ED Triage Vitals [05/14/19 0909]   /88   Pulse 93   Resp 18   Temp 97.8 °F (36.6 °C)   Temp src Temporal   SpO2 99 %   O2 Device None (Room for these tests on the individual orders. RAINBOW DRAW BLUE   RAINBOW DRAW LAVENDER   RAINBOW DRAW LIGHT GREEN   RAINBOW DRAW GOLD   CBC W/ DIFFERENTIAL              MDM   Lab and CT results noted. No cause of the patient's pain found at this time.   Pos

## 2019-05-24 RX ORDER — PANTOPRAZOLE SODIUM 40 MG/1
TABLET, DELAYED RELEASE ORAL
Qty: 90 TABLET | Refills: 0 | Status: SHIPPED | OUTPATIENT
Start: 2019-05-24 | End: 2019-06-22

## 2019-05-24 NOTE — TELEPHONE ENCOUNTER
Failed protocol no recent appt. Also duplicate therapy on rantidine. Please advise.      Refill Protocol Appointment Criteria  · Appointment scheduled in the past 12 months or in the next 3 months  Recent Outpatient Visits            1 year ago Flu-like sym

## 2019-06-19 ENCOUNTER — NURSE TRIAGE (OUTPATIENT)
Dept: OTHER | Age: 54
End: 2019-06-19

## 2019-06-19 NOTE — TELEPHONE ENCOUNTER
appt made -pt preferred Saturday d/t work scheduled c/c x 3 months after she eats feels tiref ,dizzy, vision blurry, stated she saw eye dr felt it was r/t BS    Reason for Disposition  • MILD weakness (i.e., does not interfere with ability to work, go to s

## 2019-06-22 ENCOUNTER — LAB ENCOUNTER (OUTPATIENT)
Dept: LAB | Age: 54
End: 2019-06-22
Attending: PHYSICIAN ASSISTANT
Payer: MEDICAID

## 2019-06-22 ENCOUNTER — OFFICE VISIT (OUTPATIENT)
Dept: FAMILY MEDICINE CLINIC | Facility: CLINIC | Age: 54
End: 2019-06-22
Payer: MEDICAID

## 2019-06-22 VITALS
DIASTOLIC BLOOD PRESSURE: 73 MMHG | HEART RATE: 82 BPM | BODY MASS INDEX: 32 KG/M2 | RESPIRATION RATE: 15 BRPM | TEMPERATURE: 98 F | SYSTOLIC BLOOD PRESSURE: 111 MMHG | HEIGHT: 65 IN

## 2019-06-22 DIAGNOSIS — R53.83 FATIGUE, UNSPECIFIED TYPE: Primary | ICD-10-CM

## 2019-06-22 DIAGNOSIS — G43.001 MIGRAINE WITHOUT AURA AND WITH STATUS MIGRAINOSUS, NOT INTRACTABLE: ICD-10-CM

## 2019-06-22 DIAGNOSIS — R53.83 FATIGUE, UNSPECIFIED TYPE: ICD-10-CM

## 2019-06-22 DIAGNOSIS — F51.01 PRIMARY INSOMNIA: ICD-10-CM

## 2019-06-22 DIAGNOSIS — N89.8 VAGINAL DISCHARGE: ICD-10-CM

## 2019-06-22 PROBLEM — G43.009 MIGRAINE WITHOUT AURA: Status: ACTIVE | Noted: 2019-06-22

## 2019-06-22 PROCEDURE — 84450 TRANSFERASE (AST) (SGOT): CPT

## 2019-06-22 PROCEDURE — 84443 ASSAY THYROID STIM HORMONE: CPT

## 2019-06-22 PROCEDURE — 83036 HEMOGLOBIN GLYCOSYLATED A1C: CPT

## 2019-06-22 PROCEDURE — 84460 ALANINE AMINO (ALT) (SGPT): CPT

## 2019-06-22 PROCEDURE — 99212 OFFICE O/P EST SF 10 MIN: CPT | Performed by: PHYSICIAN ASSISTANT

## 2019-06-22 PROCEDURE — 85025 COMPLETE CBC W/AUTO DIFF WBC: CPT

## 2019-06-22 PROCEDURE — 36415 COLL VENOUS BLD VENIPUNCTURE: CPT

## 2019-06-22 PROCEDURE — 82306 VITAMIN D 25 HYDROXY: CPT

## 2019-06-22 PROCEDURE — 80061 LIPID PANEL: CPT

## 2019-06-22 PROCEDURE — 80048 BASIC METABOLIC PNL TOTAL CA: CPT

## 2019-06-22 PROCEDURE — 99214 OFFICE O/P EST MOD 30 MIN: CPT | Performed by: PHYSICIAN ASSISTANT

## 2019-06-22 RX ORDER — RANITIDINE 150 MG/1
150 TABLET ORAL 2 TIMES DAILY
Qty: 90 TABLET | Refills: 1 | Status: SHIPPED | OUTPATIENT
Start: 2019-06-22 | End: 2019-12-02

## 2019-06-22 RX ORDER — ZOLPIDEM TARTRATE 5 MG/1
5 TABLET ORAL NIGHTLY PRN
Qty: 30 TABLET | Refills: 0 | Status: SHIPPED | OUTPATIENT
Start: 2019-06-22 | End: 2019-12-04

## 2019-06-22 RX ORDER — TRAMADOL HYDROCHLORIDE 50 MG/1
50 TABLET ORAL EVERY 6 HOURS PRN
Qty: 30 TABLET | Refills: 0 | Status: SHIPPED | OUTPATIENT
Start: 2019-06-22 | End: 2019-08-17

## 2019-06-22 RX ORDER — PANTOPRAZOLE SODIUM 40 MG/1
TABLET, DELAYED RELEASE ORAL
Qty: 90 TABLET | Refills: 1 | Status: SHIPPED | OUTPATIENT
Start: 2019-06-22 | End: 2019-10-04

## 2019-06-22 NOTE — PATIENT INSTRUCTIONS
Black cohosh  Dong Quai  Evening primrose  Corydon's wort  Deana akila  Soy products  Wild yam creams

## 2019-06-22 NOTE — PROGRESS NOTES
HPI:     HPI  48year-old female is here in the office complaining of fatigue for the past 3 months. Patient also experiences of intermittent hot flash, feels tired, dizziness, weight gain, hair loss. Secondly,patient requests to refill Ambien.  Patient t on 11/07/2018  Annual Depression Screen due on 03/03/2019  Influenza Vaccine(Season Ended) due on 09/01/2019  Pap Smear,3 Years due on 09/24/2019    Patient's last menstrual period was 05/01/2001.    Past Medical History:     Past Medical History:   Diagnos Used    Substance and Sexual Activity      Alcohol use: No      Drug use: No      Sexual activity: Not on file    Lifestyle      Physical activity:        Days per week: Not on file        Minutes per session: Not on file      Stress: Not on file    Relati bleeding, vaginal pain and pelvic pain. Skin: Negative for rash. Neurological: Positive for dizziness and headaches. Negative for seizures, syncope and weakness.         06/22/19  0924   BP: 111/73   Pulse: 82   Resp: 15   Temp: 98.4 °F (36.9 °C)   Temp culture ordered. Other    Migraine without aura     Refill Tramadol 50 mg PO QD as needed for pain.            Other Visit Diagnoses     Fatigue, unspecified type    -  Primary    Relevant Orders    ALT (SGPT)    AST (SGOT)    BASIC METABOLIC PAN

## 2019-06-25 ENCOUNTER — TELEPHONE (OUTPATIENT)
Dept: OTHER | Age: 54
End: 2019-06-25

## 2019-06-25 RX ORDER — SIMVASTATIN 80 MG
80 TABLET ORAL NIGHTLY
Qty: 30 TABLET | Refills: 2 | Status: SHIPPED | OUTPATIENT
Start: 2019-06-25 | End: 2019-12-03

## 2019-06-27 NOTE — TELEPHONE ENCOUNTER
LMTCB and schedule appt as per below. CSS please assist in scheduling appt as per KAK's recommendation below.

## 2019-06-28 NOTE — TELEPHONE ENCOUNTER
Future Appointments   Date Time Provider Eneida Davis   7/3/2019  4:30 PM Derian Flores PA-C [facility] [place]

## 2019-07-01 ENCOUNTER — TELEPHONE (OUTPATIENT)
Dept: FAMILY MEDICINE CLINIC | Facility: CLINIC | Age: 54
End: 2019-07-01

## 2019-07-01 NOTE — TELEPHONE ENCOUNTER
Patient called again stating that the yellow discharge she spoke about in her previous call now has an odor. Informed patient that message will be routed to her provider. Pharmacy verified and patient states she is using [facility] in [place].

## 2019-07-01 NOTE — TELEPHONE ENCOUNTER
Pt states OV with MIRIAM Tejeda on 6/22 with vaginal discharge. States lab for vaginosis was negative at that time, but continues to have large amount of light yellow discharge and is asking for an antibiotic be sent to her pharmacy.  States changing a lite pad f

## 2019-07-02 NOTE — TELEPHONE ENCOUNTER
LMTCB to relay FJR message below    Sent to MIN for review and response (patient does have f/u appt with MIN tomorrow

## 2019-07-02 NOTE — TELEPHONE ENCOUNTER
Please make it clear to me why this does not go back to the physician assistant; it may be that the physician assistant cannot prescribe if that be the case then that is what I need to know why this is been sent to me.   With that being said I cannot random

## 2019-07-03 ENCOUNTER — OFFICE VISIT (OUTPATIENT)
Dept: FAMILY MEDICINE CLINIC | Facility: CLINIC | Age: 54
End: 2019-07-03
Payer: MEDICAID

## 2019-07-03 VITALS
DIASTOLIC BLOOD PRESSURE: 73 MMHG | BODY MASS INDEX: 32.32 KG/M2 | HEART RATE: 85 BPM | HEIGHT: 65 IN | SYSTOLIC BLOOD PRESSURE: 113 MMHG | RESPIRATION RATE: 15 BRPM | TEMPERATURE: 99 F | WEIGHT: 194 LBS

## 2019-07-03 DIAGNOSIS — R10.2 SUPRAPUBIC PAIN, ACUTE: ICD-10-CM

## 2019-07-03 DIAGNOSIS — N76.0 ACUTE VAGINITIS: Primary | ICD-10-CM

## 2019-07-03 LAB
BILIRUBIN: NEGATIVE
GLUCOSE (URINE DIPSTICK): NEGATIVE MG/DL
KETONES (URINE DIPSTICK): NEGATIVE MG/DL
MULTISTIX LOT#: NORMAL NUMERIC
NITRITE, URINE: NEGATIVE
OCCULT BLOOD: NEGATIVE
PH, URINE: 5.5 (ref 4.5–8)
SPECIFIC GRAVITY: 1.03 (ref 1–1.03)
URINE-COLOR: YELLOW
UROBILINOGEN,SEMI-QN: 0.2 MG/DL (ref 0–1.9)

## 2019-07-03 PROCEDURE — 99213 OFFICE O/P EST LOW 20 MIN: CPT | Performed by: PHYSICIAN ASSISTANT

## 2019-07-03 PROCEDURE — 81002 URINALYSIS NONAUTO W/O SCOPE: CPT | Performed by: PHYSICIAN ASSISTANT

## 2019-07-03 RX ORDER — METRONIDAZOLE 500 MG/1
500 TABLET ORAL 2 TIMES DAILY
Qty: 14 TABLET | Refills: 1 | Status: SHIPPED | OUTPATIENT
Start: 2019-07-03 | End: 2019-07-10

## 2019-07-05 PROBLEM — N76.0 ACUTE VAGINITIS: Status: ACTIVE | Noted: 2019-07-05

## 2019-07-05 LAB
GENITAL VAGINOSIS SCREEN: NEGATIVE
TRICHOMONAS SCREEN: POSITIVE

## 2019-07-05 NOTE — PROGRESS NOTES
HPI:     Vaginal Discharge   The patient's primary symptoms include a genital odor and vaginal discharge. The patient's pertinent negatives include no genital itching, genital lesions, pelvic pain or vaginal bleeding. This is a new problem.  The current epi Pain. Disp:  Rfl:        Allergies:     Hydromorphone           NAUSEA AND VOMITING    Comment:Given while in ER.   Sumatriptan             OTHER (SEE COMMENTS)    Comment:Felt depressed    History:   Mammogram due on 03/05/2015  FIT Colorectal Screening du Collections    Social Needs      Financial resource strain: Not on file      Food insecurity:        Worry: Not on file        Inability: Not on file      Transportation needs:        Medical: Not on file        Non-medical: Not on file    Tobacco Use Gastrointestinal: Negative. Negative for nausea, vomiting, abdominal pain, diarrhea, constipation and abdominal distention. Genitourinary: Positive for vaginal discharge.  Negative for dysuria, frequency, hematuria, flank pain, vaginal bleeding, vagina CULTURE, ROUTINE (Completed)          Discussed plan of care with pt and pt is in agreement. All questions answered. Pt to call with questions or concerns. Encouraged to sign up for My Chart if not already registered.

## 2019-07-09 ENCOUNTER — TELEPHONE (OUTPATIENT)
Dept: FAMILY MEDICINE CLINIC | Facility: CLINIC | Age: 54
End: 2019-07-09

## 2019-07-09 NOTE — TELEPHONE ENCOUNTER
Received note from insurance for pt. States simvastatin 80 mg no longer covered by insurance. Needs PA or change to formulary medication.     Left detailed message for pt informing of above and to contact PCP office or provider that is treating cholesterol

## 2019-08-17 ENCOUNTER — TELEPHONE (OUTPATIENT)
Dept: OTHER | Age: 54
End: 2019-08-17

## 2019-08-17 DIAGNOSIS — G43.001 MIGRAINE WITHOUT AURA AND WITH STATUS MIGRAINOSUS, NOT INTRACTABLE: ICD-10-CM

## 2019-08-17 RX ORDER — TRAMADOL HYDROCHLORIDE 50 MG/1
50 TABLET ORAL EVERY 6 HOURS PRN
Qty: 30 TABLET | Refills: 1 | Status: SHIPPED | OUTPATIENT
Start: 2019-08-17 | End: 2019-10-04

## 2019-08-17 NOTE — TELEPHONE ENCOUNTER
Pt asking for Tramadol refill, states she had a prescription for Tramadol for migraine headaches and is out of that medication. She has a migraine headache that started on Monday. Pt states she has a headache now.  Pt has been getting headaches daily, on We

## 2019-08-31 ENCOUNTER — TELEPHONE (OUTPATIENT)
Dept: FAMILY MEDICINE CLINIC | Facility: CLINIC | Age: 54
End: 2019-08-31

## 2019-08-31 RX ORDER — METRONIDAZOLE 500 MG/1
500 TABLET ORAL 2 TIMES DAILY
Qty: 28 TABLET | Refills: 0 | Status: SHIPPED | OUTPATIENT
Start: 2019-08-31 | End: 2019-09-14

## 2019-08-31 RX ORDER — METRONIDAZOLE 500 MG/1
TABLET ORAL
Qty: 14 TABLET | Refills: 0 | OUTPATIENT
Start: 2019-08-31

## 2019-08-31 NOTE — TELEPHONE ENCOUNTER
Pt report abdominal pain 3/10,  Bloating, and bed taste in her mouth. Requesting flagyl. Pt states this what happens when she has a vaginal infection. Positive for clear discharge, itchiness and little irritation.  Please advise

## 2019-08-31 NOTE — TELEPHONE ENCOUNTER
On-call page–patient requesting metronidazole for abdominal pain. She reports periumbilical intermittent abdominal pain for the past 3 days. No nausea, diarrhea or melena.   She states this is a typical symptom for bacterial vaginosis for her and has reso

## 2019-08-31 NOTE — TELEPHONE ENCOUNTER
RN pls call pt and verify the need of rx refill, pls triage or offer ov if needed, thanks. Review pended refill request as it does not fall under a protocol.   Requested Prescriptions     Pending Prescriptions Disp Refills   • METRONIDAZOLE 500 MG Oral

## 2019-10-01 RX ORDER — PANTOPRAZOLE SODIUM 40 MG/1
TABLET, DELAYED RELEASE ORAL
Qty: 90 TABLET | Refills: 0 | OUTPATIENT
Start: 2019-10-01

## 2019-10-04 ENCOUNTER — OFFICE VISIT (OUTPATIENT)
Dept: FAMILY MEDICINE CLINIC | Facility: CLINIC | Age: 54
End: 2019-10-04
Payer: COMMERCIAL

## 2019-10-04 VITALS
RESPIRATION RATE: 17 BRPM | HEART RATE: 85 BPM | HEIGHT: 65 IN | SYSTOLIC BLOOD PRESSURE: 132 MMHG | BODY MASS INDEX: 32.49 KG/M2 | WEIGHT: 195 LBS | DIASTOLIC BLOOD PRESSURE: 84 MMHG | TEMPERATURE: 99 F

## 2019-10-04 DIAGNOSIS — J06.9 VIRAL UPPER RESPIRATORY INFECTION: Primary | ICD-10-CM

## 2019-10-04 DIAGNOSIS — G43.001 MIGRAINE WITHOUT AURA AND WITH STATUS MIGRAINOSUS, NOT INTRACTABLE: ICD-10-CM

## 2019-10-04 PROBLEM — N76.0 ACUTE VAGINITIS: Status: RESOLVED | Noted: 2019-07-05 | Resolved: 2019-10-04

## 2019-10-04 PROBLEM — Z00.00 PHYSICAL EXAM, ROUTINE: Status: RESOLVED | Noted: 2017-03-18 | Resolved: 2019-10-04

## 2019-10-04 PROBLEM — N89.8 VAGINAL DISCHARGE: Status: RESOLVED | Noted: 2017-03-18 | Resolved: 2019-10-04

## 2019-10-04 PROCEDURE — 99213 OFFICE O/P EST LOW 20 MIN: CPT | Performed by: PHYSICIAN ASSISTANT

## 2019-10-04 RX ORDER — FLUTICASONE PROPIONATE 50 MCG
2 SPRAY, SUSPENSION (ML) NASAL DAILY
Qty: 1 BOTTLE | Refills: 3 | Status: SHIPPED | OUTPATIENT
Start: 2019-10-04 | End: 2019-11-03

## 2019-10-04 RX ORDER — BENZONATATE 200 MG/1
200 CAPSULE ORAL 3 TIMES DAILY PRN
Qty: 30 CAPSULE | Refills: 0 | Status: SHIPPED | OUTPATIENT
Start: 2019-10-04 | End: 2019-12-04 | Stop reason: ALTCHOICE

## 2019-10-04 RX ORDER — CETIRIZINE HYDROCHLORIDE 10 MG/1
10 TABLET ORAL DAILY
Qty: 90 TABLET | Refills: 3 | Status: SHIPPED | OUTPATIENT
Start: 2019-10-04 | End: 2021-01-25

## 2019-10-04 RX ORDER — PANTOPRAZOLE SODIUM 40 MG/1
TABLET, DELAYED RELEASE ORAL
Qty: 90 TABLET | Refills: 1 | Status: SHIPPED | OUTPATIENT
Start: 2019-10-04 | End: 2019-12-04

## 2019-10-04 RX ORDER — TRAMADOL HYDROCHLORIDE 50 MG/1
50 TABLET ORAL EVERY 6 HOURS PRN
Qty: 30 TABLET | Refills: 1 | Status: SHIPPED | OUTPATIENT
Start: 2019-10-04 | End: 2019-12-04

## 2019-10-04 NOTE — PROGRESS NOTES
HPI: URI    This is a new problem. The current episode started in the past 7 days. The problem has been unchanged. There has been no fever. Associated symptoms include congestion, coughing, diarrhea, rhinorrhea, sneezing and a sore throat.  Pertinent ne by mouth every 6 (six) hours as needed for Pain. Disp:  Rfl:        Allergies:     Hydromorphone           NAUSEA AND VOMITING    Comment:Given while in ER.   Sumatriptan             OTHER (SEE COMMENTS)    Comment:Felt depressed    History:   Mammogram due Occupational History      Occupation: Collections    Social Needs      Financial resource strain: Not on file      Food insecurity:        Worry: Not on file        Inability: Not on file      Transportation needs:        Medical: Not on file        Non-m cough. Negative for chest tightness, shortness of breath and wheezing. Cardiovascular: Negative for chest pain and palpitations. Gastrointestinal: Positive for diarrhea.  Negative for nausea, vomiting, abdominal pain, constipation, blood in stool and a affect. Assessment and Plan[de-identified]     Problem List Items Addressed This Visit        Infectious/Inflammatory    Viral upper respiratory infection - Primary     Discussed with patient that it's likely viral in origin.  Discussed with patient to rest when n

## 2019-10-04 NOTE — ASSESSMENT & PLAN NOTE
Discussed with patient that it's likely viral in origin. Discussed with patient to rest when needed but no activity restrictions, use mask if coughing/sneezing, regular hand washing with soap and water, adequate hydration and nutrition.  Use tylenol or Ibup

## 2019-10-05 ENCOUNTER — TELEPHONE (OUTPATIENT)
Dept: OTHER | Age: 54
End: 2019-10-05

## 2019-10-05 ENCOUNTER — TELEPHONE (OUTPATIENT)
Dept: FAMILY MEDICINE CLINIC | Facility: CLINIC | Age: 54
End: 2019-10-05

## 2019-10-05 NOTE — TELEPHONE ENCOUNTER
Spoke with the patient and informed her of the message below from Dr. Elidia Parker. Patient reports she was advised to call the office if her phlegm turned green by Ada Price.  Patient was advised to proceed to the urgent care clinic or walk-in clinic for jaylon

## 2019-10-05 NOTE — TELEPHONE ENCOUNTER
Min evaluated this patient, I suggest asking her if antibiotics are appropriate. I don't prescribe antibiotics to patients I don't clinically evaluate.  Thanks

## 2019-10-05 NOTE — TELEPHONE ENCOUNTER
If Min thought she needed them, she would have prescribed them. Please advise supportive care. Needs OV for abx.  Thanks

## 2019-10-05 NOTE — TELEPHONE ENCOUNTER
Patient calling back, advised Dr Caitlin Sanchez note and states that she was just at the office yesterday to see Sharmaine Byrne. Dr Adelfo Arellano was at the office yesterday only, she is requesting for the antibiotic to be phone in. Please advise.     Please

## 2019-10-05 NOTE — TELEPHONE ENCOUNTER
Attempt made to contact the patient; no answer no option given to leave a message. [employer] message sent as well.

## 2019-10-05 NOTE — TELEPHONE ENCOUNTER
Forwarded to Dr. Roberto Mcdonald, on call. Please see message below and advise. Pt was seen by Carlos VALENTINO yesterday.

## 2019-10-05 NOTE — TELEPHONE ENCOUNTER
Patient calling back, advised Dr Mgagie Heller note, unhappy, advised WIC but states that she will pay high deductible and she was just in the office yesterday. Home care advised given but states that it will not do good. States that if ever she developed pneu

## 2019-10-05 NOTE — TELEPHONE ENCOUNTER
Received early AM on call message about Rx that was not received by pharmacy. Seen yesterday. Will forward to office staff to figure missing Rx.

## 2019-10-05 NOTE — TELEPHONE ENCOUNTER
LOV 10/4/19, states that she is worse than yesterday, greenish phlegm now, sinus congestion, both ear pain, no fever, no sob, no cp, been drinking lots of water, requesting antibiotic, pharmacy on file.   Advised to increase Vit C intake (*juice and fruits)

## 2019-10-09 NOTE — TELEPHONE ENCOUNTER
Attempted to call pt no answer vm box full. Spoke with pharmacy [facility] who states pt has picked up Augmentin yesterday.

## 2019-10-16 ENCOUNTER — TELEPHONE (OUTPATIENT)
Dept: OTHER | Age: 54
End: 2019-10-16

## 2019-10-16 NOTE — TELEPHONE ENCOUNTER
Pt called stated she was not better and wants -ABX-advised was sent 10/6/19- called Pharmacy - ready to p/u Pt advised

## 2019-10-26 ENCOUNTER — NURSE TRIAGE (OUTPATIENT)
Dept: FAMILY MEDICINE CLINIC | Facility: CLINIC | Age: 54
End: 2019-10-26

## 2019-10-26 NOTE — TELEPHONE ENCOUNTER
Action Requested: Summary for Provider     []  Critical Lab, Recommendations Needed  [] Need Additional Advice  []   FYI    []   Need Orders  [] Need Medications Sent to Pharmacy  []  Other     SUMMARY: patient calling to report she is feeling weak, shaky,

## 2019-10-26 NOTE — TELEPHONE ENCOUNTER
Called and discussed with patient regarding her condition. Advise patient to proceed to ER if worsen. Will order Lipid panel and HbA1C. Discussed with patient that insurance wont cover test strip, lancet and glucometer due to prediabetes.  Patient verbalize

## 2019-12-02 RX ORDER — RANITIDINE 150 MG/1
150 TABLET ORAL 2 TIMES DAILY
Qty: 90 TABLET | Refills: 1 | Status: SHIPPED | OUTPATIENT
Start: 2019-12-02 | End: 2019-12-04

## 2019-12-02 RX ORDER — PANTOPRAZOLE SODIUM 40 MG/1
TABLET, DELAYED RELEASE ORAL
Qty: 90 TABLET | Refills: 1 | OUTPATIENT
Start: 2019-12-02

## 2019-12-02 NOTE — TELEPHONE ENCOUNTER
Refill passed per [facility], [facility] protocol.     Refill Protocol Appointment Criteria  · Appointment scheduled in the past 12 months or in the next 3 months  Recent Outpatient Visits            1 month ago Viral upper respiratory infection    [facility]

## 2019-12-03 RX ORDER — SIMVASTATIN 80 MG
80 TABLET ORAL NIGHTLY
Qty: 30 TABLET | Refills: 2 | Status: SHIPPED | OUTPATIENT
Start: 2019-12-03 | End: 2019-12-04 | Stop reason: ALTCHOICE

## 2019-12-04 ENCOUNTER — NURSE TRIAGE (OUTPATIENT)
Dept: OTHER | Age: 54
End: 2019-12-04

## 2019-12-04 ENCOUNTER — OFFICE VISIT (OUTPATIENT)
Dept: FAMILY MEDICINE CLINIC | Facility: CLINIC | Age: 54
End: 2019-12-04
Payer: COMMERCIAL

## 2019-12-04 VITALS
RESPIRATION RATE: 20 BRPM | TEMPERATURE: 98 F | DIASTOLIC BLOOD PRESSURE: 94 MMHG | HEIGHT: 65 IN | OXYGEN SATURATION: 96 % | SYSTOLIC BLOOD PRESSURE: 138 MMHG | HEART RATE: 83 BPM | BODY MASS INDEX: 33.99 KG/M2 | WEIGHT: 204 LBS

## 2019-12-04 DIAGNOSIS — I10 ESSENTIAL HYPERTENSION: Primary | ICD-10-CM

## 2019-12-04 DIAGNOSIS — G43.001 MIGRAINE WITHOUT AURA AND WITH STATUS MIGRAINOSUS, NOT INTRACTABLE: ICD-10-CM

## 2019-12-04 PROCEDURE — 99213 OFFICE O/P EST LOW 20 MIN: CPT | Performed by: PHYSICIAN ASSISTANT

## 2019-12-04 PROCEDURE — 82962 GLUCOSE BLOOD TEST: CPT | Performed by: PHYSICIAN ASSISTANT

## 2019-12-04 PROCEDURE — 36416 COLLJ CAPILLARY BLOOD SPEC: CPT | Performed by: PHYSICIAN ASSISTANT

## 2019-12-04 RX ORDER — LISINOPRIL 5 MG/1
5 TABLET ORAL DAILY
Qty: 90 TABLET | Refills: 3 | Status: SHIPPED | OUTPATIENT
Start: 2019-12-04 | End: 2019-12-11 | Stop reason: SINTOL

## 2019-12-04 RX ORDER — ATORVASTATIN CALCIUM 40 MG/1
40 TABLET, FILM COATED ORAL NIGHTLY
Qty: 90 TABLET | Refills: 3 | Status: SHIPPED | OUTPATIENT
Start: 2019-12-04 | End: 2020-03-03

## 2019-12-04 RX ORDER — POTASSIUM CHLORIDE 20 MEQ/1
20 TABLET, EXTENDED RELEASE ORAL DAILY
Qty: 90 TABLET | Refills: 1 | Status: SHIPPED | OUTPATIENT
Start: 2019-12-04 | End: 2020-03-03

## 2019-12-04 RX ORDER — ZOLPIDEM TARTRATE 5 MG/1
5 TABLET ORAL NIGHTLY PRN
Qty: 30 TABLET | Refills: 2 | Status: SHIPPED | OUTPATIENT
Start: 2019-12-04 | End: 2020-02-29 | Stop reason: ALTCHOICE

## 2019-12-04 RX ORDER — PANTOPRAZOLE SODIUM 40 MG/1
TABLET, DELAYED RELEASE ORAL
Qty: 90 TABLET | Refills: 1 | Status: SHIPPED | OUTPATIENT
Start: 2019-12-04 | End: 2020-08-14

## 2019-12-04 RX ORDER — TRAMADOL HYDROCHLORIDE 50 MG/1
50 TABLET ORAL EVERY 6 HOURS PRN
Qty: 30 TABLET | Refills: 1 | Status: SHIPPED | OUTPATIENT
Start: 2019-12-04 | End: 2020-04-11 | Stop reason: ALTCHOICE

## 2019-12-04 RX ORDER — RANITIDINE 150 MG/1
150 TABLET ORAL 2 TIMES DAILY
Qty: 180 TABLET | Refills: 1 | Status: SHIPPED | OUTPATIENT
Start: 2019-12-04 | End: 2020-03-03

## 2019-12-04 NOTE — PROGRESS NOTES
HPI:     Headache    This is a new problem. The current episode started in the past 7 days. The problem occurs intermittently. The problem has been unchanged. The pain is located in the temporal region. The pain does not radiate.  The pain quality is not si Chew Tab Chew 81 mg by mouth daily. Allergies:     Hydromorphone           NAUSEA AND VOMITING    Comment:Given while in ER.   Sumatriptan             OTHER (SEE COMMENTS)    Comment:Felt depressed    History:   Mammogram due on 03/05/2015  [employer] [place] Occupation: Collections    Social Needs      Financial resource strain: Not on file      Food insecurity:        Worry: Not on file        Inability: Not on file      Transportation needs:        Medical: Not on file        Non-medical: Not on file    T chest tightness, shortness of breath and wheezing. Cardiovascular: Negative for chest pain and palpitations. Gastrointestinal: Negative for nausea, vomiting, abdominal pain, diarrhea, constipation, blood in stool and abdominal distention.    Musculoske Plan[de-identified]     Problem List Items Addressed This Visit        Cardiovascular    Essential hypertension - Primary     Start Lisinopril 5 mg PO QD. Advise patient to monitor BP at home and limit salt intake. Patient verbalized understanding.            Relevant M

## 2019-12-04 NOTE — TELEPHONE ENCOUNTER
Please advise pt to get lab work done per [employer] below, confirmed that pt does not consistently check mychart

## 2019-12-05 PROBLEM — I10 ESSENTIAL HYPERTENSION: Status: ACTIVE | Noted: 2019-12-05

## 2019-12-05 PROBLEM — J06.9 VIRAL UPPER RESPIRATORY INFECTION: Status: RESOLVED | Noted: 2019-10-04 | Resolved: 2019-12-05

## 2019-12-05 NOTE — ASSESSMENT & PLAN NOTE
Start Lisinopril 5 mg PO QD. Advise patient to monitor BP at home and limit salt intake. Patient verbalized understanding.

## 2019-12-11 ENCOUNTER — TELEPHONE (OUTPATIENT)
Dept: OTHER | Age: 54
End: 2019-12-11

## 2019-12-11 RX ORDER — AMLODIPINE BESYLATE 2.5 MG/1
2.5 TABLET ORAL DAILY
Qty: 30 TABLET | Refills: 2 | Status: SHIPPED | OUTPATIENT
Start: 2019-12-11 | End: 2020-01-10

## 2019-12-11 NOTE — TELEPHONE ENCOUNTER
Advised patient of MIRIAM Moscoso's note. Patient verbalized understanding. Medication list updated.

## 2019-12-11 NOTE — TELEPHONE ENCOUNTER
Min Pt stated that she was in to see you on Oct for for a cough and on 12/4/2019 and she still has the dry cough but she feels that it is getting worse. She stated that the cough is consistent during the day and at night.  At work they are asking her if she

## 2019-12-11 NOTE — TELEPHONE ENCOUNTER
Patient does not need antibiotic. Will stop Lisinopril 5 mg QD. Change to Amlodipine 2.5 mg PO QD. Rx sent to pharmacy. Please advise patient to monitor BP at home. If cough persists, needs OV.

## 2019-12-18 ENCOUNTER — TELEPHONE (OUTPATIENT)
Dept: FAMILY MEDICINE CLINIC | Facility: CLINIC | Age: 54
End: 2019-12-18

## 2019-12-18 NOTE — TELEPHONE ENCOUNTER
Patient states she's been coughing (with a headache) for two months and cannot control it. States she was on Lisinopril then switched to Amlodipine, but states it has not changed the frequency of her coughing and headaches.   Patient states her boss has now

## 2019-12-19 NOTE — TELEPHONE ENCOUNTER
Unable to leave , mailbox full. Sent [employer] message informing patient that she needs a follow up OV for her ongoing symptoms.

## 2019-12-20 NOTE — TELEPHONE ENCOUNTER
Pt called back and was inform of Min message below and she verbalized understanding. She will see Min tomorrow as she is not able to come in today. Pt stated that the cough is getting worse. I then inform her that she should be seen today.  She stated that

## 2019-12-20 NOTE — TELEPHONE ENCOUNTER
Attempted to reach pt, no answer and VM box full.        Left Message To Call Back on pt daughter VM (on SARA)

## 2019-12-21 ENCOUNTER — OFFICE VISIT (OUTPATIENT)
Dept: FAMILY MEDICINE CLINIC | Facility: CLINIC | Age: 54
End: 2019-12-21
Payer: COMMERCIAL

## 2019-12-21 VITALS
BODY MASS INDEX: 32.15 KG/M2 | TEMPERATURE: 98 F | HEART RATE: 88 BPM | RESPIRATION RATE: 19 BRPM | HEIGHT: 65 IN | WEIGHT: 193 LBS | DIASTOLIC BLOOD PRESSURE: 71 MMHG | SYSTOLIC BLOOD PRESSURE: 118 MMHG

## 2019-12-21 DIAGNOSIS — I10 ESSENTIAL HYPERTENSION: ICD-10-CM

## 2019-12-21 DIAGNOSIS — J30.0 VASOMOTOR RHINITIS: ICD-10-CM

## 2019-12-21 DIAGNOSIS — R05.9 COUGH: ICD-10-CM

## 2019-12-21 DIAGNOSIS — J22 LOWER RESPIRATORY INFECTION: Primary | ICD-10-CM

## 2019-12-21 PROCEDURE — 99213 OFFICE O/P EST LOW 20 MIN: CPT | Performed by: PHYSICIAN ASSISTANT

## 2019-12-21 RX ORDER — MONTELUKAST SODIUM 10 MG/1
10 TABLET ORAL DAILY
Qty: 90 TABLET | Refills: 3 | Status: SHIPPED | OUTPATIENT
Start: 2019-12-21 | End: 2020-12-15

## 2019-12-21 RX ORDER — CODEINE PHOSPHATE AND GUAIFENESIN 10; 100 MG/5ML; MG/5ML
5 SOLUTION ORAL EVERY 6 HOURS PRN
Qty: 100 ML | Refills: 0 | Status: SHIPPED | OUTPATIENT
Start: 2019-12-21 | End: 2020-02-29 | Stop reason: ALTCHOICE

## 2019-12-21 RX ORDER — POTASSIUM CHLORIDE 1500 MG/1
TABLET, FILM COATED, EXTENDED RELEASE ORAL
Refills: 1 | COMMUNITY
Start: 2019-12-04 | End: 2020-02-29

## 2019-12-21 RX ORDER — AZITHROMYCIN 250 MG/1
TABLET, FILM COATED ORAL
Qty: 6 TABLET | Refills: 0 | Status: SHIPPED | OUTPATIENT
Start: 2019-12-21 | End: 2020-02-29 | Stop reason: ALTCHOICE

## 2019-12-21 NOTE — PROGRESS NOTES
HPI:     Cough   This is a recurrent problem. The current episode started 1 to 4 weeks ago. The problem has been gradually worsening. The cough is non-productive. Associated symptoms include chills, a fever, headaches and rhinorrhea.  Pertinent negatives in aspirin-acetaminophen-caffeine (EXCEDRIN MIGRAINE) 250-250-65 MG Oral Tab Take 1 tablet by mouth every 6 (six) hours as needed for Pain. • aspirin 81 MG Oral Chew Tab Chew 81 mg by mouth daily.          Allergies:     Hydromorphone           NAUSEA AND       Spouse name: Not on file      Number of children: 3      Years of education: Not on file      Highest education level: Not on file    Occupational History      Occupation: Collections    Social Needs      Financial resource strain: Not on file rhinorrhea. Negative for congestion, ear discharge, ear pain, postnasal drip, sinus pressure and sore throat. Respiratory: Positive for cough. Negative for chest tightness, shortness of breath and wheezing.     Cardiovascular: Negative for chest pain and EXTERNAL        Respiratory    Lower respiratory infection - Primary     Start Zpak. Advise patient to take Cheratussin AC : 5 ml PO QHS for cough.             Infectious/Inflammatory    Vasomotor rhinitis     Start Singular 10 mg PO QPM.           Other Vi

## 2019-12-22 PROBLEM — F51.01 PRIMARY INSOMNIA: Status: RESOLVED | Noted: 2019-06-22 | Resolved: 2019-12-22

## 2019-12-22 PROBLEM — J30.0 VASOMOTOR RHINITIS: Status: ACTIVE | Noted: 2019-12-22

## 2019-12-22 PROBLEM — J22 LOWER RESPIRATORY INFECTION: Status: ACTIVE | Noted: 2019-12-22

## 2020-02-29 ENCOUNTER — OFFICE VISIT (OUTPATIENT)
Dept: FAMILY MEDICINE CLINIC | Facility: CLINIC | Age: 55
End: 2020-02-29
Payer: COMMERCIAL

## 2020-02-29 VITALS
HEART RATE: 94 BPM | SYSTOLIC BLOOD PRESSURE: 123 MMHG | DIASTOLIC BLOOD PRESSURE: 81 MMHG | BODY MASS INDEX: 32.82 KG/M2 | TEMPERATURE: 100 F | HEIGHT: 65 IN | WEIGHT: 197 LBS

## 2020-02-29 DIAGNOSIS — J06.9 VIRAL UPPER RESPIRATORY INFECTION: Primary | ICD-10-CM

## 2020-02-29 LAB
FLUAV + FLUBV RNA SPEC NAA+PROBE: NEGATIVE
FLUAV + FLUBV RNA SPEC NAA+PROBE: NEGATIVE
FLUAV + FLUBV RNA SPEC NAA+PROBE: POSITIVE

## 2020-02-29 PROCEDURE — 99213 OFFICE O/P EST LOW 20 MIN: CPT | Performed by: PHYSICIAN ASSISTANT

## 2020-02-29 RX ORDER — AMLODIPINE BESYLATE 2.5 MG/1
TABLET ORAL
COMMUNITY
Start: 2020-02-19 | End: 2020-05-07

## 2020-02-29 RX ORDER — OSELTAMIVIR PHOSPHATE 75 MG/1
75 CAPSULE ORAL 2 TIMES DAILY
Qty: 10 CAPSULE | Refills: 0 | Status: SHIPPED | OUTPATIENT
Start: 2020-02-29 | End: 2020-03-05

## 2020-02-29 RX ORDER — ZOLPIDEM TARTRATE 5 MG/1
5 TABLET ORAL NIGHTLY PRN
Qty: 30 TABLET | Refills: 1 | Status: SHIPPED | OUTPATIENT
Start: 2020-02-29 | End: 2020-03-30

## 2020-02-29 NOTE — PROGRESS NOTES
HPI:     Cough   This is a new problem. The current episode started today. The cough is non-productive. Associated symptoms include chills, a fever, headaches, nasal congestion, rhinorrhea and a sore throat.  Pertinent negatives include no chest pain, ear c Comment:Felt depressed    History:   Mammogram due on 03/05/2015  FIT Colorectal Screening due on 07/22/2015  Annual Physical due on 03/18/2018  Colonoscopy due on 11/07/2018  Influenza Vaccine(1) due on 09/01/2019  Pap Smear,3 Years due on 09/24/2019  Enrique Roman Transportation needs:        Medical: Not on file        Non-medical: Not on file    Tobacco Use      Smoking status: Never Smoker      Smokeless tobacco: Never Used    Substance and Sexual Activity      Alcohol use: No      Drug use: No      Sexual [place] diarrhea, constipation, blood in stool and abdominal distention. Skin: Negative for rash. Neurological: Positive for headaches. Negative for dizziness, syncope, weakness and numbness.       02/29/20  1041   BP: 123/81   Pulse: 94   Temp: 99.8 °F (37.7 ° or Ibuprofen as directed for fever and body ache and warm humidified air. Start Tamiflu 75 mg PO BID for 5 days. Discussed plan of care with pt and pt is in agreement. All questions answered. Pt to call with questions or concerns.

## 2020-03-28 ENCOUNTER — PATIENT MESSAGE (OUTPATIENT)
Dept: FAMILY MEDICINE CLINIC | Facility: CLINIC | Age: 55
End: 2020-03-28

## 2020-03-30 ENCOUNTER — TELEPHONE (OUTPATIENT)
Dept: FAMILY MEDICINE CLINIC | Facility: CLINIC | Age: 55
End: 2020-03-30

## 2020-03-30 RX ORDER — HYDROCODONE BITARTRATE AND ACETAMINOPHEN 5; 325 MG/1; MG/1
1 TABLET ORAL EVERY 6 HOURS PRN
Qty: 10 TABLET | Refills: 0 | Status: SHIPPED | OUTPATIENT
Start: 2020-03-30 | End: 2020-04-02

## 2020-03-30 NOTE — TELEPHONE ENCOUNTER
Shalom Torres, please advise on 2 issues. 1. Since last Monday, has felt a flare-up of pain in stomach area, right kidney are with backache. She had gone to ER before for this, has kidney stone. She denied hematuria, or seen any stone pass.  She is drinking f

## 2020-03-30 NOTE — TELEPHONE ENCOUNTER
From: Julianne Freitas  To: Izabel Burnett PA-C  Sent: 3/28/2020 3:19 PM CDT  Subject: Prescription Question    Good afternoon Dr. Cristal Nageotte, I was wondering if you could call in some Flagyl for me? I'm having a little pain in the pit of My stomach.

## 2020-03-30 NOTE — TELEPHONE ENCOUNTER
Advised to call Triage    ----- Message from Denisa Barahona. Phil Bermudez sent at 3/28/2020  3:19 PM CDT -----  Regarding: Prescription Question  Contact: [phone]  Good afternoon Dr. Cristal Nageotte, I was wondering if you could call in some Flagyl for me?  I'm havi

## 2020-03-30 NOTE — TELEPHONE ENCOUNTER
Per patient she can not come in tomorrow, would like to know if she can come in on Wednesday 04/01/2020 around 11:45AM.

## 2020-03-30 NOTE — TELEPHONE ENCOUNTER
Called and discussed with patient regarding right flank pain. Norco 5/325 mg sent to pharmacy. Patient is coming tomorrow for evaluation.

## 2020-03-31 NOTE — TELEPHONE ENCOUNTER
MARCO A: Talked to patient she can not come in today nor tomorrow due to her work called her to set up her work home today and she needs to work from 8-4:30pm everyday, patient states that she will call back next week to try to come in.

## 2020-04-04 NOTE — TELEPHONE ENCOUNTER
Per patient she knows they want her to come in for an OV regarding her stomach pain however she is working from home 8-4:30 every weekday and by the time she comes all the way to the office it will take her 45-an hour.  Per patient\" I know my body and I've

## 2020-04-05 RX ORDER — METRONIDAZOLE 500 MG/1
500 TABLET ORAL 2 TIMES DAILY
Qty: 14 TABLET | Refills: 0 | Status: SHIPPED | OUTPATIENT
Start: 2020-04-05 | End: 2020-04-11 | Stop reason: ALTCHOICE

## 2020-04-09 NOTE — TELEPHONE ENCOUNTER
Spoke with the patient who reports she has been taking the flagyl and it has helped, but last night the stomach pain returned.  Patient reports the pain is present in the pit of her stomach that radiates to the right side of her abdomen and to the middle of

## 2020-04-11 ENCOUNTER — APPOINTMENT (OUTPATIENT)
Dept: LAB | Age: 55
End: 2020-04-11
Attending: PHYSICIAN ASSISTANT
Payer: COMMERCIAL

## 2020-04-11 ENCOUNTER — HOSPITAL ENCOUNTER (OUTPATIENT)
Dept: CT IMAGING | Facility: HOSPITAL | Age: 55
Discharge: HOME | End: 2020-04-11
Attending: PHYSICIAN ASSISTANT
Payer: COMMERCIAL

## 2020-04-11 ENCOUNTER — LAB ENCOUNTER (OUTPATIENT)
Dept: LAB | Age: 55
End: 2020-04-11
Attending: PHYSICIAN ASSISTANT
Payer: COMMERCIAL

## 2020-04-11 ENCOUNTER — OFFICE VISIT (OUTPATIENT)
Dept: FAMILY MEDICINE CLINIC | Facility: CLINIC | Age: 55
End: 2020-04-11
Payer: COMMERCIAL

## 2020-04-11 ENCOUNTER — TELEPHONE (OUTPATIENT)
Dept: FAMILY MEDICINE CLINIC | Facility: CLINIC | Age: 55
End: 2020-04-11

## 2020-04-11 VITALS
HEIGHT: 65 IN | TEMPERATURE: 98 F | BODY MASS INDEX: 32.77 KG/M2 | WEIGHT: 196.69 LBS | SYSTOLIC BLOOD PRESSURE: 126 MMHG | DIASTOLIC BLOOD PRESSURE: 78 MMHG | HEART RATE: 77 BPM | RESPIRATION RATE: 18 BRPM

## 2020-04-11 DIAGNOSIS — R10.31 RLQ ABDOMINAL PAIN: ICD-10-CM

## 2020-04-11 DIAGNOSIS — R10.13 EPIGASTRIC PAIN: ICD-10-CM

## 2020-04-11 DIAGNOSIS — R73.03 PREDIABETES: ICD-10-CM

## 2020-04-11 DIAGNOSIS — R10.31 RLQ ABDOMINAL PAIN: Primary | ICD-10-CM

## 2020-04-11 PROCEDURE — 83013 H PYLORI (C-13) BREATH: CPT | Performed by: PHYSICIAN ASSISTANT

## 2020-04-11 PROCEDURE — 80048 BASIC METABOLIC PNL TOTAL CA: CPT

## 2020-04-11 PROCEDURE — 82565 ASSAY OF CREATININE: CPT

## 2020-04-11 PROCEDURE — 83036 HEMOGLOBIN GLYCOSYLATED A1C: CPT

## 2020-04-11 PROCEDURE — 99213 OFFICE O/P EST LOW 20 MIN: CPT | Performed by: PHYSICIAN ASSISTANT

## 2020-04-11 PROCEDURE — 83690 ASSAY OF LIPASE: CPT

## 2020-04-11 PROCEDURE — 80061 LIPID PANEL: CPT

## 2020-04-11 PROCEDURE — 85025 COMPLETE CBC W/AUTO DIFF WBC: CPT

## 2020-04-11 PROCEDURE — 84450 TRANSFERASE (AST) (SGOT): CPT

## 2020-04-11 PROCEDURE — 36415 COLL VENOUS BLD VENIPUNCTURE: CPT

## 2020-04-11 PROCEDURE — 81003 URINALYSIS AUTO W/O SCOPE: CPT | Performed by: PHYSICIAN ASSISTANT

## 2020-04-11 PROCEDURE — 84460 ALANINE AMINO (ALT) (SGPT): CPT

## 2020-04-11 PROCEDURE — 82150 ASSAY OF AMYLASE: CPT

## 2020-04-11 PROCEDURE — 81025 URINE PREGNANCY TEST: CPT | Performed by: PHYSICIAN ASSISTANT

## 2020-04-11 PROCEDURE — 74177 CT ABD & PELVIS W/CONTRAST: CPT | Performed by: PHYSICIAN ASSISTANT

## 2020-04-11 RX ORDER — HYDROCODONE BITARTRATE AND ACETAMINOPHEN 5; 325 MG/1; MG/1
1 TABLET ORAL EVERY 6 HOURS PRN
Qty: 20 TABLET | Refills: 0 | Status: SHIPPED | OUTPATIENT
Start: 2020-04-11 | End: 2020-04-21

## 2020-04-11 RX ORDER — ATORVASTATIN CALCIUM 40 MG/1
40 TABLET, FILM COATED ORAL NIGHTLY
COMMUNITY
Start: 2020-04-01 | End: 2021-04-14

## 2020-04-11 RX ORDER — RANITIDINE 150 MG/1
TABLET ORAL
COMMUNITY
Start: 2018-11-08 | End: 2020-04-11 | Stop reason: ALTCHOICE

## 2020-04-11 NOTE — TELEPHONE ENCOUNTER
On-call: Labs were done stat so the lab called me. All the labs are normal.  I will let the provider who ordered the labs deal with it as nothing out of the ordinary.

## 2020-04-11 NOTE — PROGRESS NOTES
Contacted [facility] -pts insurance- for prior authorization for CPT code 92097.   Automatic answering service states \"No Authorization needed for this CPT code\"  Confirmation # [id number]

## 2020-04-12 NOTE — PROGRESS NOTES
HPI:     Abdominal Pain   This is a new problem. The current episode started 1 to 4 weeks ago. The problem occurs daily. The problem has been unchanged. The pain is located in the epigastric region. The quality of the pain is burning.  The abdominal pain ra Years due on 09/24/2019  Annual Depression Screen due on 12/04/2020    Patient's last menstrual period was 05/01/2001.    Past Medical History:     Past Medical History:   Diagnosis Date   • Allergy     seasonal allergies   • Esophageal reflux    • Fibroid, Sexual activity: Not on file    Lifestyle      Physical activity:        Days per week: Not on file        Minutes per session: Not on file      Stress: Not on file    Relationships      Social connections:        Talks on phone: Not on file        Gets Pulse: 77   Resp: 18   Temp: 98.3 °F (36.8 °C)   TempSrc: Oral   Weight: 196 lb 11.2 oz (89.2 kg)   Height: 5' 5\" (1.651 m)     Body mass index is 32.73 kg/m². Physical Exam:   Physical Exam   Vitals reviewed.    Constitutional: She is oriented to per DIFFERENTIAL WITH PLATELET (Completed)    LIPASE (Completed)    AMYLASE (Completed)    HELICOBACTER PYLORI BREATH TEST, ADULT (>17)      Continue with Pantoprazole 40 mg PO QD. Advise patient to proceed to ER if worsen.     Discussed plan of care with pt a

## 2020-05-07 RX ORDER — AMLODIPINE BESYLATE 2.5 MG/1
TABLET ORAL
Qty: 90 TABLET | Refills: 1 | Status: SHIPPED | OUTPATIENT
Start: 2020-05-07 | End: 2020-11-13

## 2020-05-20 NOTE — TELEPHONE ENCOUNTER
Patient calling and requesting bigger size of triamcinolone, states that her pharmacy gave her small tube 15 gm,pharmacy verified.       Carlos Eckert=see above, medication pended for 80 gms(not sure if this is the biggest one -medication based from medication

## 2020-06-16 ENCOUNTER — NURSE TRIAGE (OUTPATIENT)
Dept: FAMILY MEDICINE CLINIC | Facility: CLINIC | Age: 55
End: 2020-06-16

## 2020-06-16 ENCOUNTER — OFFICE VISIT (OUTPATIENT)
Dept: FAMILY MEDICINE CLINIC | Facility: CLINIC | Age: 55
End: 2020-06-16
Payer: COMMERCIAL

## 2020-06-16 VITALS
RESPIRATION RATE: 18 BRPM | BODY MASS INDEX: 32.65 KG/M2 | TEMPERATURE: 98 F | SYSTOLIC BLOOD PRESSURE: 122 MMHG | DIASTOLIC BLOOD PRESSURE: 83 MMHG | HEART RATE: 84 BPM | HEIGHT: 65 IN | WEIGHT: 196 LBS

## 2020-06-16 DIAGNOSIS — I10 ESSENTIAL HYPERTENSION: ICD-10-CM

## 2020-06-16 DIAGNOSIS — Z80.3 FAMILY HISTORY OF MALIGNANT NEOPLASM OF FEMALE BREAST: ICD-10-CM

## 2020-06-16 DIAGNOSIS — R10.9 ABDOMINAL PAIN, UNSPECIFIED ABDOMINAL LOCATION: ICD-10-CM

## 2020-06-16 DIAGNOSIS — N76.0 BACTERIAL VAGINOSIS: ICD-10-CM

## 2020-06-16 DIAGNOSIS — R60.0 EDEMA OF BOTH LOWER LEGS: ICD-10-CM

## 2020-06-16 DIAGNOSIS — Z12.39 ENCOUNTER FOR SCREENING FOR MALIGNANT NEOPLASM OF BREAST: Primary | ICD-10-CM

## 2020-06-16 DIAGNOSIS — B96.89 BACTERIAL VAGINOSIS: ICD-10-CM

## 2020-06-16 DIAGNOSIS — Z86.010 HISTORY OF COLON POLYPS: ICD-10-CM

## 2020-06-16 PROBLEM — Z86.0100 HISTORY OF COLON POLYPS: Status: ACTIVE | Noted: 2020-06-16

## 2020-06-16 PROCEDURE — 99213 OFFICE O/P EST LOW 20 MIN: CPT | Performed by: PHYSICIAN ASSISTANT

## 2020-06-16 PROCEDURE — 81002 URINALYSIS NONAUTO W/O SCOPE: CPT | Performed by: PHYSICIAN ASSISTANT

## 2020-06-16 RX ORDER — METRONIDAZOLE 500 MG/1
500 TABLET ORAL 2 TIMES DAILY
Qty: 14 TABLET | Refills: 0 | Status: SHIPPED | OUTPATIENT
Start: 2020-06-16 | End: 2020-06-23

## 2020-06-16 RX ORDER — METRONIDAZOLE 500 MG/1
TABLET ORAL
Qty: 14 TABLET | Refills: 0 | OUTPATIENT
Start: 2020-06-16

## 2020-06-16 RX ORDER — HYDROCHLOROTHIAZIDE 12.5 MG/1
12.5 TABLET ORAL DAILY
Qty: 90 TABLET | Refills: 3 | Status: SHIPPED | OUTPATIENT
Start: 2020-06-16 | End: 2021-06-23

## 2020-06-16 NOTE — PROGRESS NOTES
HPI:     HPI  47year-old female is here in the office complaining of intermittent elevated BP and left sided breast pain for the past 1 month. Patient states that her mother was diagnosed of breast CA.  Patient refuses to have mammogram many times in the p Comment:Felt depressed    History:   Mammogram due on 03/05/2015  FIT Colorectal Screening due on 07/22/2015  Annual Physical due on 03/18/2018  Colonoscopy due on 11/07/2018  Pap Smear,3 Years due on 09/24/2019  Influenza Vaccine(Season Ended) due on 09/0 Transportation needs:        Medical: Not on file        Non-medical: Not on file    Tobacco Use      Smoking status: Never Smoker      Smokeless tobacco: Never Used    Substance and Sexual Activity      Alcohol use: No      Drug use: No      Sexual [place] stool and abdominal distention. Genitourinary: Positive for vaginal discharge and breast pain. Negative for dysuria, frequency, hematuria, flank pain, vaginal bleeding, genital sores, vaginal pain and pelvic pain. Skin: Negative for rash.    Neurologica Essential hypertension     Continue with Amlodipine 2.5 mg PO QAM.         Relevant Medications    hydrochlorothiazide 12.5 MG Oral Tab       Musculoskeletal    Edema of both lower legs     Start HCTZ 12.5 mg PO QAM. Advise patient to limit salt intake

## 2020-06-16 NOTE — TELEPHONE ENCOUNTER
Action Requested: Summary for Provider     []  Critical Lab, Recommendations Needed  [] Need Additional Advice  []   FYI    []   Need Orders  [] Need Medications Sent to Pharmacy  []  Other     SUMMARY:    Appointment made for today 6/16/2020  Instructed n

## 2020-06-17 NOTE — H&P
[address] [facility] [address] Gastroenterology                                                                                                  Clinic History and Physical     Pa EGD/flex sig performed by Dr. Lani Mchugh with MAC related to rectal bleeding, right upper quadrant pain, findings: Internal hemorrhoids likely source of the patient's rectal bleeding, slight loss of vascular markings in the rectum/sigmoid possibly prep related, visit):  Current Outpatient Medications   Medication Sig Dispense Refill   • PEG 3350-KCl-Na Bicarb-NaCl (TRILYTE) 420 g Oral Recon Soln Take prep as directed by gastro office.  May substitute with Trilyte/generic equivalent if needed 1 Bottle 0   • traMADo ALLERGIC/IMMUNOLOGIC:  negative for hay fever  ENDOCRINE:  negative for cold intolerance and heat intolerance  MUSCULOSKELETAL:  negative for joint effusion/severe erythema  BEHAVIOR/PSYCH:  negative for psychotic behavior      PHYSICAL EXAM:   Blood pre however when ranitidine was recalled, she began to have increasing breakthrough symptoms. + self increase pantoprazole to 40 mg twice daily and has been doing well though she still has some symptoms.   We discussed options for further management which coul leading to prolonged hospitalization or surgical intervention. Miss rate of colonoscopy of 5-10% discussed.   It is the patient's responsibility to contact his/her insurance company regarding questions about out-of-pocket cost/benefits and was provided the

## 2020-06-19 ENCOUNTER — TELEPHONE (OUTPATIENT)
Dept: FAMILY MEDICINE CLINIC | Facility: CLINIC | Age: 55
End: 2020-06-19

## 2020-06-19 NOTE — TELEPHONE ENCOUNTER
Jasmyn from [facility] mammography department states patient is scheduled for a screening mammogram on 6/20. Per patient Dr palpated something on breast if that is the case the order will have to be changed to a diagnostic mammogram. Jasmyn will like a respond by today at 3, so if needed appointment for tomorrow can be rescheduled.

## 2020-06-22 ENCOUNTER — HOSPITAL ENCOUNTER (OUTPATIENT)
Dept: MAMMOGRAPHY | Facility: HOSPITAL | Age: 55
Discharge: HOME | End: 2020-06-22
Attending: PHYSICIAN ASSISTANT
Payer: COMMERCIAL

## 2020-06-22 ENCOUNTER — HOSPITAL ENCOUNTER (OUTPATIENT)
Dept: ULTRASOUND IMAGING | Facility: HOSPITAL | Age: 55
Discharge: HOME | End: 2020-06-22
Attending: PHYSICIAN ASSISTANT
Payer: COMMERCIAL

## 2020-06-22 DIAGNOSIS — N63.20 LEFT BREAST MASS: ICD-10-CM

## 2020-06-22 PROCEDURE — 77062 BREAST TOMOSYNTHESIS BI: CPT | Performed by: PHYSICIAN ASSISTANT

## 2020-06-22 PROCEDURE — 77066 DX MAMMO INCL CAD BI: CPT | Performed by: PHYSICIAN ASSISTANT

## 2020-06-22 PROCEDURE — 76642 ULTRASOUND BREAST LIMITED: CPT | Performed by: PHYSICIAN ASSISTANT

## 2020-07-01 ENCOUNTER — OFFICE VISIT (OUTPATIENT)
Dept: GASTROENTEROLOGY | Facility: CLINIC | Age: 55
End: 2020-07-01
Payer: MEDICAID

## 2020-07-01 ENCOUNTER — TELEPHONE (OUTPATIENT)
Dept: GASTROENTEROLOGY | Facility: CLINIC | Age: 55
End: 2020-07-01

## 2020-07-01 VITALS
HEART RATE: 105 BPM | DIASTOLIC BLOOD PRESSURE: 69 MMHG | SYSTOLIC BLOOD PRESSURE: 105 MMHG | HEIGHT: 65 IN | WEIGHT: 197 LBS | BODY MASS INDEX: 32.82 KG/M2

## 2020-07-01 DIAGNOSIS — R10.9 ABDOMINAL PAIN, UNSPECIFIED ABDOMINAL LOCATION: Primary | ICD-10-CM

## 2020-07-01 DIAGNOSIS — Z80.0 FH: COLON CANCER: ICD-10-CM

## 2020-07-01 DIAGNOSIS — K21.9 GASTROESOPHAGEAL REFLUX DISEASE, ESOPHAGITIS PRESENCE NOT SPECIFIED: ICD-10-CM

## 2020-07-01 DIAGNOSIS — Z80.0 FAMILY HISTORY OF COLON CANCER: Primary | ICD-10-CM

## 2020-07-01 DIAGNOSIS — Z86.010 HISTORY OF COLON POLYPS: ICD-10-CM

## 2020-07-01 DIAGNOSIS — R10.13 EPIGASTRIC PAIN: ICD-10-CM

## 2020-07-01 PROCEDURE — 99204 OFFICE O/P NEW MOD 45 MIN: CPT | Performed by: NURSE PRACTITIONER

## 2020-07-01 RX ORDER — ZOLPIDEM TARTRATE 5 MG/1
TABLET ORAL
COMMUNITY
Start: 2020-06-20 | End: 2020-09-19 | Stop reason: ALTCHOICE

## 2020-07-01 RX ORDER — TRAMADOL HYDROCHLORIDE 50 MG/1
50 TABLET ORAL EVERY 6 HOURS PRN
COMMUNITY
Start: 2020-05-16 | End: 2020-09-19 | Stop reason: ALTCHOICE

## 2020-07-01 RX ORDER — POLYETHYLENE GLYCOL 3350, SODIUM CHLORIDE, SODIUM BICARBONATE, POTASSIUM CHLORIDE 420; 11.2; 5.72; 1.48 G/4L; G/4L; G/4L; G/4L
POWDER, FOR SOLUTION ORAL
Qty: 1 BOTTLE | Refills: 0 | Status: SHIPPED | OUTPATIENT
Start: 2020-07-01 | End: 2020-09-11

## 2020-07-01 NOTE — PATIENT INSTRUCTIONS
-Schedule colonoscopy/EGD w/ Dr. Carlos Barton with MAC related to BMI >30  Dx: epigastric pain, GERD, hx colon polyps, FHCC  -Eligible for NE: Yes  -Prep: Split dose Colyte/TriLyte or equivalent  -Anti-platelets and anti-coagulants: ASA-continue as prescribed  -D

## 2020-07-01 NOTE — TELEPHONE ENCOUNTER
Scheduled for:  Colon [phone] EGD [id number]  Provider Name:  Dr Adrienne Parker  Date:  8/28/2020  Location:  [facility]  Sedation:  MAC  Time:  10:45  Prep:  Colyte  Meds/Allergies Reconciled?: Maggy/APN reviewed.   Diagnosis with codes:  History of Colon Polyps Z86.010; Epigas

## 2020-07-03 RX ORDER — TRAMADOL HYDROCHLORIDE 50 MG/1
TABLET ORAL
Qty: 30 TABLET | Refills: 0 | OUTPATIENT
Start: 2020-07-03

## 2020-07-08 RX ORDER — TRAMADOL HYDROCHLORIDE 50 MG/1
50 TABLET ORAL EVERY 6 HOURS PRN
Qty: 30 TABLET | Refills: 1 | Status: SHIPPED | OUTPATIENT
Start: 2020-07-08 | End: 2020-09-19 | Stop reason: ALTCHOICE

## 2020-07-08 NOTE — TELEPHONE ENCOUNTER
Patient contacted office. States she tried to refill Tramadol prescription for chronic headaches.     Please advise-  Thanks

## 2020-08-14 RX ORDER — PANTOPRAZOLE SODIUM 40 MG/1
TABLET, DELAYED RELEASE ORAL
Qty: 90 TABLET | Refills: 1 | Status: SHIPPED | OUTPATIENT
Start: 2020-08-14 | End: 2020-09-11

## 2020-08-24 ENCOUNTER — TELEPHONE (OUTPATIENT)
Dept: GASTROENTEROLOGY | Facility: CLINIC | Age: 55
End: 2020-08-24

## 2020-08-24 ENCOUNTER — NURSE TRIAGE (OUTPATIENT)
Dept: FAMILY MEDICINE CLINIC | Facility: CLINIC | Age: 55
End: 2020-08-24

## 2020-08-24 NOTE — TELEPHONE ENCOUNTER
Spoke with pt, discussed all prep instructions with her. Pt verbalized understanding. Prep instructions sent to pt via [employer]. TE closed.

## 2020-08-24 NOTE — TELEPHONE ENCOUNTER
Please reply to pool: EM RN TRIAGE    Action Requested: Summary for Provider     []  Critical Lab, Recommendations Needed  [] Need Additional Advice  []   FYI    []   Need Orders  [] Need Medications Sent to Pharmacy  []  Other     SUMMARY: Wants to se

## 2020-08-26 ENCOUNTER — APPOINTMENT (OUTPATIENT)
Dept: LAB | Facility: HOSPITAL | Age: 55
End: 2020-08-26
Attending: INTERNAL MEDICINE
Payer: MEDICAID

## 2020-08-26 DIAGNOSIS — Z01.818 PRE-OP TESTING: ICD-10-CM

## 2020-08-27 LAB — SARS-COV-2 RNA RESP QL NAA+PROBE: NOT DETECTED

## 2020-08-27 NOTE — TELEPHONE ENCOUNTER
Patient called to reschedule her appoinetnt today with Mercy Berumen PA-C. Per patient, she is prepping for a colonoscopy that is scheduled for tomorrow. Patient states she is still having pain but is taking BC powder as stated in note below.      Patient

## 2020-08-28 ENCOUNTER — ANESTHESIA (OUTPATIENT)
Dept: ENDOSCOPY | Facility: HOSPITAL | Age: 55
End: 2020-08-28
Payer: MEDICAID

## 2020-08-28 ENCOUNTER — ANESTHESIA EVENT (OUTPATIENT)
Dept: ENDOSCOPY | Facility: HOSPITAL | Age: 55
End: 2020-08-28
Payer: MEDICAID

## 2020-08-28 ENCOUNTER — HOSPITAL ENCOUNTER (OUTPATIENT)
Facility: HOSPITAL | Age: 55
Setting detail: HOSPITAL OUTPATIENT SURGERY
Discharge: HOME | End: 2020-08-28
Attending: INTERNAL MEDICINE | Admitting: INTERNAL MEDICINE
Payer: MEDICAID

## 2020-08-28 DIAGNOSIS — Z80.0 FH: COLON CANCER: ICD-10-CM

## 2020-08-28 DIAGNOSIS — K21.9 GASTROESOPHAGEAL REFLUX DISEASE, ESOPHAGITIS PRESENCE NOT SPECIFIED: ICD-10-CM

## 2020-08-28 DIAGNOSIS — R10.9 ABDOMINAL PAIN, UNSPECIFIED ABDOMINAL LOCATION: ICD-10-CM

## 2020-08-28 DIAGNOSIS — Z86.010 HISTORY OF COLON POLYPS: ICD-10-CM

## 2020-08-28 DIAGNOSIS — Z01.818 PRE-OP TESTING: Primary | ICD-10-CM

## 2020-08-28 PROCEDURE — 0DBM8ZX EXCISION OF DESCENDING COLON, VIA NATURAL OR ARTIFICIAL OPENING ENDOSCOPIC, DIAGNOSTIC: ICD-10-PCS | Performed by: INTERNAL MEDICINE

## 2020-08-28 PROCEDURE — 43235 EGD DIAGNOSTIC BRUSH WASH: CPT | Performed by: INTERNAL MEDICINE

## 2020-08-28 PROCEDURE — 45385 COLONOSCOPY W/LESION REMOVAL: CPT | Performed by: INTERNAL MEDICINE

## 2020-08-28 RX ORDER — GLYCOPYRROLATE 0.2 MG/ML
INJECTION, SOLUTION INTRAMUSCULAR; INTRAVENOUS AS NEEDED
Status: DISCONTINUED | OUTPATIENT
Start: 2020-08-28 | End: 2020-08-28 | Stop reason: SURG

## 2020-08-28 RX ORDER — SODIUM CHLORIDE, SODIUM LACTATE, POTASSIUM CHLORIDE, CALCIUM CHLORIDE 600; 310; 30; 20 MG/100ML; MG/100ML; MG/100ML; MG/100ML
INJECTION, SOLUTION INTRAVENOUS CONTINUOUS
Status: DISCONTINUED | OUTPATIENT
Start: 2020-08-28 | End: 2020-08-28

## 2020-08-28 RX ORDER — SODIUM CHLORIDE, SODIUM LACTATE, POTASSIUM CHLORIDE, CALCIUM CHLORIDE 600; 310; 30; 20 MG/100ML; MG/100ML; MG/100ML; MG/100ML
INJECTION, SOLUTION INTRAVENOUS CONTINUOUS
Status: CANCELLED | OUTPATIENT
Start: 2020-08-28

## 2020-08-28 RX ORDER — NALOXONE HYDROCHLORIDE 0.4 MG/ML
80 INJECTION, SOLUTION INTRAMUSCULAR; INTRAVENOUS; SUBCUTANEOUS AS NEEDED
Status: CANCELLED | OUTPATIENT
Start: 2020-08-28 | End: 2020-08-28

## 2020-08-28 RX ORDER — LIDOCAINE HYDROCHLORIDE 10 MG/ML
INJECTION, SOLUTION EPIDURAL; INFILTRATION; INTRACAUDAL; PERINEURAL AS NEEDED
Status: DISCONTINUED | OUTPATIENT
Start: 2020-08-28 | End: 2020-08-28 | Stop reason: SURG

## 2020-08-28 RX ADMIN — GLYCOPYRROLATE 0.2 MG: 0.2 INJECTION, SOLUTION INTRAMUSCULAR; INTRAVENOUS at 11:28:00

## 2020-08-28 RX ADMIN — LIDOCAINE HYDROCHLORIDE 90 MG: 10 INJECTION, SOLUTION EPIDURAL; INFILTRATION; INTRACAUDAL; PERINEURAL at 11:28:00

## 2020-08-28 RX ADMIN — SODIUM CHLORIDE, SODIUM LACTATE, POTASSIUM CHLORIDE, CALCIUM CHLORIDE: 600; 310; 30; 20 INJECTION, SOLUTION INTRAVENOUS at 12:01:00

## 2020-08-28 NOTE — OPERATIVE REPORT
[facility] HOSP - [facility] Endoscopy Report      Preoperative Diagnosis:  - colon cancer screening  - history of colon polyps   - GERD      Postoperative Diagnosis:  - colon polyp x 1  - diverticulosis  - internal hemorrhoids  - nonerosive reflux esophagit diaphragmatic impression were at 39 cm. The stomach distended appropriately with insufflated air.   The mucosa of the stomach including cardia, fundus, gastric body and antrum were normal.  The duodenal bulb and post bulbar regions were normal.    Estimate

## 2020-08-28 NOTE — ANESTHESIA POSTPROCEDURE EVALUATION
Patient: Arlyn Calhoun    Procedure Summary     Date:  08/28/20 Room / Location:  [facility] ENDOSCOPY 01 / [facility] ENDOSCOPY    Anesthesia Start:  4377 Anesthesia Stop:  1331    Procedures:       COLONOSCOPY (N/A )      ESOPHAGOGASTRODUODENOSCOPY (EGD) (N/A

## 2020-08-28 NOTE — ANESTHESIA PREPROCEDURE EVALUATION
Anesthesia PreOp Note    HPI:     Marce Us is a 54year old female who presents for preoperative consultation requested by: Rock Samy MD    Date of Surgery: 8/28/2020    Procedure(s):  COLONOSCOPY  ESOPHAGOGASTRODUODENOSCOPY (EGD)  In hysterectomy. Fibroids       PANTOPRAZOLE SODIUM 40 MG Oral Tab EC, TAKE 1 TABLET BY MOUTH EVERY MORNING BEFORE BREAKFAST, Disp: 90 tablet, Rfl: 1, 8/27/2020  traMADol HCl 50 MG Oral Tab, Take 50 mg by mouth every 6 (six) hours as needed. , Disp: , Rfl: , (SEE COMMENTS)    Comment:Felt depressed    Family History   Problem Relation Age of Onset   • Hypertension Father    • Breast Cancer Mother 46   • Prostate Cancer Maternal Grandfather 61   • Colon Cancer Maternal Grandfather    • Cancer Maternal Citlaly  Not Asked        Hobby Hazards: Not Asked        Sleep Concern: Not Asked        Stress Concern: Not Asked        Weight Concern: Not Asked        Special Diet: Not Asked        Back Care: Not Asked        Exercise: Not Asked        Bike Helmet: Not Asked

## 2020-08-28 NOTE — H&P
History & Physical Examination    Patient Name: India Cook  MRN: [id number]  CSN: [id number]  YOB: 1965    Diagnosis: history of colon polyps  GERD      PANTOPRAZOLE SODIUM 40 MG Oral Tab EC, TAKE 1 TABLET BY MOUTH EVERY MORNING B while in ER. Sumatriptan             OTHER (SEE COMMENTS)    Comment:Felt depressed    Past Medical History:   Diagnosis Date   • Allergy     seasonal allergies   • Esophageal reflux    • Fibroid, uterine     fibroids [women's health].   2001 total abdomin days.  Any changes noted above. Miryam Dickinson  8/28/2020  11:06 AM

## 2020-08-29 VITALS
HEIGHT: 65 IN | SYSTOLIC BLOOD PRESSURE: 122 MMHG | TEMPERATURE: 98 F | HEART RATE: 76 BPM | BODY MASS INDEX: 32.65 KG/M2 | OXYGEN SATURATION: 99 % | WEIGHT: 196 LBS | RESPIRATION RATE: 20 BRPM | DIASTOLIC BLOOD PRESSURE: 87 MMHG

## 2020-08-31 ENCOUNTER — TELEMEDICINE (OUTPATIENT)
Dept: FAMILY MEDICINE CLINIC | Facility: CLINIC | Age: 55
End: 2020-08-31
Payer: MEDICAID

## 2020-08-31 ENCOUNTER — TELEPHONE (OUTPATIENT)
Dept: GASTROENTEROLOGY | Facility: CLINIC | Age: 55
End: 2020-08-31

## 2020-08-31 ENCOUNTER — TELEPHONE (OUTPATIENT)
Dept: FAMILY MEDICINE CLINIC | Facility: CLINIC | Age: 55
End: 2020-08-31

## 2020-08-31 DIAGNOSIS — M25.552 LEFT HIP PAIN: Primary | ICD-10-CM

## 2020-08-31 PROCEDURE — 99213 OFFICE O/P EST LOW 20 MIN: CPT | Performed by: PHYSICIAN ASSISTANT

## 2020-08-31 RX ORDER — ACETAMINOPHEN AND CODEINE PHOSPHATE 300; 30 MG/1; MG/1
1 TABLET ORAL EVERY 6 HOURS PRN
Qty: 30 TABLET | Refills: 0 | Status: SHIPPED | OUTPATIENT
Start: 2020-08-31 | End: 2021-04-05

## 2020-08-31 NOTE — TELEPHONE ENCOUNTER
Entered into Epic:     Recall for _CLN___per __Lynch___in __7 years____  Last done: 8-  Next BQN:2-   updated   Result letter mailed.

## 2020-08-31 NOTE — TELEPHONE ENCOUNTER
----- Message from James Peck MD sent at 8/29/2020 12:38 PM CDT -----  I wanted to get back to you with your colonoscopy results. You had one colon polyp removed which was benign.   I would advise a repeat colonoscopy in 7 years to make sure no new p

## 2020-08-31 NOTE — PROGRESS NOTES
HPI: HPI    I spoke Bill Florez by telephone , verified date of birth, and discussed current concerns. Patient complaints of left hip pain for the past 5 days. Patient states that she can walk but can not lift.  Patient has been taking BC OT Allergies:     Hydromorphone           NAUSEA AND VOMITING    Comment:Given while in ER.   Sumatriptan             OTHER (SEE COMMENTS)    Comment:Felt depressed    History:   Annual Physical due on 03/18/2018  Pap Smear,3 Years due on 09/24/2019  Inf of education: Not on file      Highest education level: Not on file    Occupational History      Occupation: Collections    Social Needs      Financial resource strain: Not on file      Food insecurity:        Worry: Not on file        Inability: Not on fi Respiratory: Negative for cough, chest tightness, shortness of breath and wheezing. Cardiovascular: Negative for chest pain and palpitations.    Gastrointestinal: Negative for nausea, vomiting, abdominal pain, diarrhea, constipation, blood in stool and

## 2020-08-31 NOTE — TELEPHONE ENCOUNTER
The patient called to state she has a 4:45 pm appointment today but does not feel like coming in. She is still recuperating from her colonoscopy last week.     The appointment was changed to a virtual appointment today at 2;00 pm.

## 2020-09-06 ENCOUNTER — TELEPHONE (OUTPATIENT)
Dept: GASTROENTEROLOGY | Facility: CLINIC | Age: 55
End: 2020-09-06

## 2020-09-06 RX ORDER — METRONIDAZOLE 500 MG/1
500 TABLET ORAL 2 TIMES DAILY
Qty: 20 TABLET | Refills: 0 | Status: SHIPPED | OUTPATIENT
Start: 2020-09-06 | End: 2020-09-19 | Stop reason: ALTCHOICE

## 2020-09-06 NOTE — TELEPHONE ENCOUNTER
The patient contact me today as the on-call physician. She had a colonoscopy on 8/28/2020 by Dr. Macy Jha. The procedure was uneventful and revealed diverticulosis.   Since this past Thursday (6 days following the procedure) the patient developed lower abdom

## 2020-09-08 NOTE — TELEPHONE ENCOUNTER
RN to contact the pt and see how she is doing on the abx - needs to be seen in office this week - please add to schedule

## 2020-09-09 NOTE — TELEPHONE ENCOUNTER
Dr. Yonatan Silvestre    I added patient to 2:45pm on Friday to follow your other overbook appointment. She reports that the antibiotics are helping. She was in no pain at time of phone call, had some pain during the night.       Patient reports some reflux and wou

## 2020-09-11 ENCOUNTER — OFFICE VISIT (OUTPATIENT)
Dept: GASTROENTEROLOGY | Facility: CLINIC | Age: 55
End: 2020-09-11
Payer: MEDICAID

## 2020-09-11 ENCOUNTER — TELEPHONE (OUTPATIENT)
Dept: GASTROENTEROLOGY | Facility: CLINIC | Age: 55
End: 2020-09-11

## 2020-09-11 VITALS
TEMPERATURE: 97 F | WEIGHT: 204.81 LBS | HEART RATE: 85 BPM | SYSTOLIC BLOOD PRESSURE: 122 MMHG | BODY MASS INDEX: 34.12 KG/M2 | HEIGHT: 65 IN | DIASTOLIC BLOOD PRESSURE: 68 MMHG

## 2020-09-11 DIAGNOSIS — K21.9 GASTROESOPHAGEAL REFLUX DISEASE, ESOPHAGITIS PRESENCE NOT SPECIFIED: ICD-10-CM

## 2020-09-11 DIAGNOSIS — R10.9 ABDOMINAL PAIN, UNSPECIFIED ABDOMINAL LOCATION: Primary | ICD-10-CM

## 2020-09-11 PROCEDURE — 3074F SYST BP LT 130 MM HG: CPT | Performed by: INTERNAL MEDICINE

## 2020-09-11 PROCEDURE — 3008F BODY MASS INDEX DOCD: CPT | Performed by: INTERNAL MEDICINE

## 2020-09-11 PROCEDURE — 3078F DIAST BP <80 MM HG: CPT | Performed by: INTERNAL MEDICINE

## 2020-09-11 PROCEDURE — 99213 OFFICE O/P EST LOW 20 MIN: CPT | Performed by: INTERNAL MEDICINE

## 2020-09-11 RX ORDER — OMEPRAZOLE 20 MG/1
20 CAPSULE, DELAYED RELEASE ORAL EVERY MORNING
Qty: 30 CAPSULE | Refills: 2 | Status: SHIPPED | OUTPATIENT
Start: 2020-09-11 | End: 2021-10-11 | Stop reason: ALTCHOICE

## 2020-09-11 RX ORDER — POTASSIUM CHLORIDE 1500 MG/1
1 TABLET, FILM COATED, EXTENDED RELEASE ORAL DAILY
COMMUNITY
Start: 2020-07-16 | End: 2021-04-05

## 2020-09-11 RX ORDER — DICYCLOMINE HYDROCHLORIDE 10 MG/1
10 CAPSULE ORAL 3 TIMES DAILY PRN
Qty: 30 CAPSULE | Refills: 1 | Status: SHIPPED | OUTPATIENT
Start: 2020-09-11 | End: 2020-11-16

## 2020-09-11 NOTE — TELEPHONE ENCOUNTER
Dr. Jody Hughes    A flu vaccine is required at patient's place of employment. However, this vaccine makes her sick. She needs a form completed by PCP and faxed back to her place of employment as soon as possible.   I faxed a copy to your office and I also i

## 2020-09-11 NOTE — PATIENT INSTRUCTIONS
Abdominal pain  - complete antibiotic/Flagyl  - dicyclomine as needed    Constipation  - Miralax  - high fiber diet    Acid reflux  - omeprazole daily

## 2020-09-11 NOTE — PROGRESS NOTES
Karmen Rhoades is a 54year old female.     HPI:   Patient presents with:  Diverticulitis: s/p Colon and Egd     The patient is a 77-year-old female with history of colon polyps, gastritis, migraine headaches who is seen today in the office with abd Breast Cancer Sister 39   • Breast Cancer Paternal Grandmother         66's   • Cancer Paternal Grandmother       Social History: Social History    Tobacco Use      Smoking status: Never Smoker      Smokeless tobacco: Never Used    Alcohol use: No    Drug • aspirin 81 MG Oral Chew Tab Chew 81 mg by mouth daily. Allergies:    Hydromorphone           NAUSEA AND VOMITING    Comment:Given while in ER.   Sumatriptan             OTHER (SEE COMMENTS)    Comment:Felt depressed      ROS:   The patient denie Take 1 capsule (10 mg total) by mouth 3 (three) times daily as needed.        Imaging & Referrals:  None       Kenyatta Merino MD  [address] [facility]

## 2020-09-11 NOTE — TELEPHONE ENCOUNTER
Patient brought form to office to excuse her from getting the flu vaccine because it makes her sick. This form is typically completed by PCP, will fax to Dr. Anna Moreno as requested by Dr. Alysha Wagner.

## 2020-09-14 ENCOUNTER — TELEPHONE (OUTPATIENT)
Dept: FAMILY MEDICINE CLINIC | Facility: CLINIC | Age: 55
End: 2020-09-14

## 2020-09-14 NOTE — TELEPHONE ENCOUNTER
Patient  calling requesting  that a flu vaccination exemption form be completed. Per patent, when  she takes the flu vaccination she gets sick shortly afterward.    Patient states last year got the shot in October 18, 2019 and got a rash and got really si

## 2020-09-14 NOTE — TELEPHONE ENCOUNTER
Pt called to follow up on message below. States she scanned another form in to [address] States form she be sent to Allan Krause, [email or web address]    Please advise.

## 2020-09-15 ENCOUNTER — PATIENT MESSAGE (OUTPATIENT)
Dept: FAMILY MEDICINE CLINIC | Facility: CLINIC | Age: 55
End: 2020-09-15

## 2020-09-15 NOTE — TELEPHONE ENCOUNTER
From: Karmen Rhoades  To: India Mcpherson PA-C  Sent: 9/15/2020 4:11 PM CDT  Subject: Other    Good evening,    Dr Narinder Calhoun, I'm attaching this exempt from taking the influenza shot. I gets sick every time I take. . For instance, I took it last [facility]

## 2020-09-16 ENCOUNTER — PATIENT MESSAGE (OUTPATIENT)
Dept: FAMILY MEDICINE CLINIC | Facility: CLINIC | Age: 55
End: 2020-09-16

## 2020-09-16 NOTE — TELEPHONE ENCOUNTER
I can do this patient's waiver form for the influenza vaccine, however she needs to come in to have her experience with her last vaccine documented.   I do not know her work schedule but I can see her on Saturday September 19, 2020 at 8:30 AM she has to be

## 2020-09-16 NOTE — TELEPHONE ENCOUNTER
S/w pt yesterday. Inquired if there was a fax number that I can fax completed form to. I didn't know if I am able to email completed form. She provided me with a fax number of [phone], and to make it attention to Karthik Méndez.   Faxed completed form

## 2020-09-16 NOTE — TELEPHONE ENCOUNTER
From: Soledad Baires  To: Domingo Spangler PA-C  Sent: 9/16/2020 12:37 PM CDT  Subject: Other    Good afternoon,    I just received an email from the [address] [place] [address] Department. They're trying to fax the Influenza Form and can't get through.

## 2020-09-17 ENCOUNTER — TELEPHONE (OUTPATIENT)
Dept: GASTROENTEROLOGY | Facility: CLINIC | Age: 55
End: 2020-09-17

## 2020-09-17 NOTE — TELEPHONE ENCOUNTER
Your PA has been faxed to the plan as a paper copy. Please contact the plan directly if you haven't received a determination in a typical timeframe. You will be notified of the determination via fax. How do I know if the plan approved the PA?      Add R

## 2020-09-17 NOTE — TELEPHONE ENCOUNTER
From: India Cook  To: Kacie Boudreaux PA-C  Sent: 9/16/2020 3:44 PM CDT  Subject: Other    Good afternoon,    Thank You so much. Enjoy the rest of Your evening.

## 2020-09-17 NOTE — TELEPHONE ENCOUNTER
Current Outpatient Medications   Medication Sig Dispense Refill   • omeprazole 20 MG Oral Capsule Delayed Release Take 1 capsule (20 mg total) by mouth every morning. 30 capsule 2     Per pharmacy patient's plan does not cover this medication.   Please fax

## 2020-09-18 NOTE — TELEPHONE ENCOUNTER
Received fax that our prior approval has been denied because it does not need prior authorization. I called [facility] and they told me that the patient already filled the prescription.

## 2020-09-19 ENCOUNTER — OFFICE VISIT (OUTPATIENT)
Dept: FAMILY MEDICINE CLINIC | Facility: CLINIC | Age: 55
End: 2020-09-19
Payer: MEDICAID

## 2020-09-19 VITALS
RESPIRATION RATE: 18 BRPM | WEIGHT: 200.81 LBS | HEIGHT: 65 IN | SYSTOLIC BLOOD PRESSURE: 112 MMHG | TEMPERATURE: 97 F | BODY MASS INDEX: 33.46 KG/M2 | DIASTOLIC BLOOD PRESSURE: 74 MMHG | HEART RATE: 89 BPM

## 2020-09-19 DIAGNOSIS — Z91.89 AT RISK FOR SIDE EFFECT OF MEDICATION: Primary | ICD-10-CM

## 2020-09-19 PROBLEM — T50.905A MEDICATION SIDE EFFECT, INITIAL ENCOUNTER: Status: ACTIVE | Noted: 2020-09-19

## 2020-09-19 PROCEDURE — 3078F DIAST BP <80 MM HG: CPT | Performed by: FAMILY MEDICINE

## 2020-09-19 PROCEDURE — 3008F BODY MASS INDEX DOCD: CPT | Performed by: FAMILY MEDICINE

## 2020-09-19 PROCEDURE — 3074F SYST BP LT 130 MM HG: CPT | Performed by: FAMILY MEDICINE

## 2020-09-19 PROCEDURE — 99213 OFFICE O/P EST LOW 20 MIN: CPT | Performed by: FAMILY MEDICINE
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
## 2020-09-19 NOTE — PATIENT INSTRUCTIONS
Letter has been generated so that she can presented to her employer making her an exemption from taking the seasonal flu vaccine or any other vaccine related to infectious disease upcoming in the near future.     Patient has been advised to increase water i

## 2020-10-01 ENCOUNTER — PATIENT MESSAGE (OUTPATIENT)
Dept: FAMILY MEDICINE CLINIC | Facility: CLINIC | Age: 55
End: 2020-10-01

## 2020-10-01 ENCOUNTER — NURSE TRIAGE (OUTPATIENT)
Dept: FAMILY MEDICINE CLINIC | Facility: CLINIC | Age: 55
End: 2020-10-01

## 2020-10-01 DIAGNOSIS — K57.92 DIVERTICULITIS: Primary | ICD-10-CM

## 2020-10-01 NOTE — TELEPHONE ENCOUNTER
msg sent to call office. ----- Message from Uriah Celis.  Judy Benz sent at 10/1/2020  9:21 AM CDT -----  Regarding: Prescription Question  Contact: [phone]  Good morning,    I wanted to know if you could call in something for me to rest at night a

## 2020-10-01 NOTE — TELEPHONE ENCOUNTER
From: Lisa Mei  To:  Akila Ruff DO  Sent: 10/1/2020 9:21 AM CDT  Subject: Prescription Question    Good morning,    I wanted to know if you could call in something for me to rest at night and Flagyl for the Flair up for the Diverticu

## 2020-10-01 NOTE — TELEPHONE ENCOUNTER
Action Requested: Summary for Provider     []  Critical Lab, Recommendations Needed  [] Need Additional Advice  []   FYI    []   Need Orders  [] Need Medications Sent to Pharmacy  []  Other      SUMMARY: Spoke with patient regarding [employer] message (see me

## 2020-10-02 RX ORDER — LEVOFLOXACIN 500 MG/1
500 TABLET, FILM COATED ORAL DAILY
Qty: 10 TABLET | Refills: 0 | Status: SHIPPED | OUTPATIENT
Start: 2020-10-02 | End: 2020-10-12

## 2020-10-02 NOTE — TELEPHONE ENCOUNTER
Verified pt name and . Informed pt antibiotic prescription (Levaquin) was sent to pharmacy. Advised pt to call back if no improvement in symptoms. Pt agreed with plan of care and had no further questions at this time.

## 2020-10-23 ENCOUNTER — NURSE TRIAGE (OUTPATIENT)
Dept: FAMILY MEDICINE CLINIC | Facility: CLINIC | Age: 55
End: 2020-10-23

## 2020-10-23 ENCOUNTER — PATIENT MESSAGE (OUTPATIENT)
Dept: FAMILY MEDICINE CLINIC | Facility: CLINIC | Age: 55
End: 2020-10-23

## 2020-10-24 NOTE — TELEPHONE ENCOUNTER
Action Requested: Summary for Provider     []  Critical Lab, Recommendations Needed  [] Need Additional Advice  [x]   FYI    []   Need Orders  [] Need Medications Sent to Pharmacy  []  Other     SUMMARY: Pt made virtual visit would like to discuss recent a

## 2020-10-24 NOTE — TELEPHONE ENCOUNTER
Marc sent. See telephone acute encounter 10/23/20. From: Marcellus Maxwell  To: Garo Bell PA-C  Sent: 10/23/2020  1:52 PM CDT  Subject: Other    Good afternoon,    I was wondering if you could call me something in to sleep.      These

## 2020-10-24 NOTE — TELEPHONE ENCOUNTER
Marc sent. RN=call and triage,possible anxiety/depression issue      ----- Message from Luis A Rowe.  Lawanda Greco sent at 10/23/2020  1:52 PM CDT -----  Regarding: Other  Contact: [phone]  Good afternoon,    I was wondering if you could call me some

## 2020-10-27 NOTE — PROGRESS NOTES
HPI: HPI  Patient states that she has been restless for the past 2 weeks. She sleeps 4 hours per night, feels tired and headache in the morning. Patient denies of feeling sad, hopelessness, fever, SOB, anxiety, hallucination, SI/HI, chest pain.     Medi Vaccine: Birth to 64yrs Completed    No LMP recorded. (Menstrual status: Partial Hysterectomy).    Past Medical History:     Past Medical History:   Diagnosis Date   • Allergy     seasonal allergies   • Colon polyp    • Esophageal reflux    • Fibroid, uteri Food insecurity        Worry: Not on file        Inability: Not on file      Transportation needs        Medical: Not on file        Non-medical: Not on file    Tobacco Use      Smoking status: Never Smoker      Smokeless tobacco: Never Used    Substance a Gastrointestinal: Negative for nausea, vomiting, abdominal pain, diarrhea, constipation, blood in stool and abdominal distention. Skin: Negative for rash. Neurological: Positive for headaches.  Negative for dizziness, syncope, speech difficulty, weakn

## 2020-11-13 ENCOUNTER — PATIENT MESSAGE (OUTPATIENT)
Dept: GASTROENTEROLOGY | Facility: CLINIC | Age: 55
End: 2020-11-13

## 2020-11-13 DIAGNOSIS — R10.30 LOWER ABDOMINAL PAIN: Primary | ICD-10-CM

## 2020-11-13 RX ORDER — AMLODIPINE BESYLATE 2.5 MG/1
TABLET ORAL
Qty: 90 TABLET | Refills: 1 | Status: SHIPPED | OUTPATIENT
Start: 2020-11-13 | End: 2021-05-18

## 2020-11-16 RX ORDER — METRONIDAZOLE 500 MG/1
500 TABLET ORAL 2 TIMES DAILY
Qty: 14 TABLET | Refills: 0 | Status: SHIPPED | OUTPATIENT
Start: 2020-11-16 | End: 2021-02-25

## 2020-11-16 RX ORDER — DICYCLOMINE HYDROCHLORIDE 10 MG/1
10 CAPSULE ORAL 3 TIMES DAILY PRN
Qty: 30 CAPSULE | Refills: 1 | Status: SHIPPED | OUTPATIENT
Start: 2020-11-16

## 2020-11-16 NOTE — TELEPHONE ENCOUNTER
I contacted the patient to provide the number to central scheduling to schedule CT scan as advised. I provided her with the number and she wrote it down. She is aware of plan.

## 2020-11-16 NOTE — TELEPHONE ENCOUNTER
Dr. Barbara Perla    I spoke to the patient. She has been having pain similar to last diverticulitis flare all weekend. She reports pain in right side/middle abdomen that is a 6/10. She denies fever, diarrhea, constipation.     I advised ED if pain severe (above

## 2020-11-16 NOTE — TELEPHONE ENCOUNTER
From: Natty Sarkar  To: Shawna Bermeo. Vasiliy Chappell MD  Sent: 11/13/2020 6:56 PM CST  Subject: Other    Good evening,    I was wondering if you could call in some Flagyl, I'm having a flair up that started yesterday. I don't want to eat due to the pain.

## 2020-11-16 NOTE — TELEPHONE ENCOUNTER
The patient was contacted, the patient began experiencing lower abdominal pain consistent with her diverticulitis flareup attacks. She was treated previously with metronidazole and symptoms improved.   Patient reports her symptoms have gotten better since

## 2020-12-10 ENCOUNTER — NURSE TRIAGE (OUTPATIENT)
Dept: FAMILY MEDICINE CLINIC | Facility: CLINIC | Age: 55
End: 2020-12-10

## 2020-12-10 ENCOUNTER — TELEMEDICINE (OUTPATIENT)
Dept: FAMILY MEDICINE CLINIC | Facility: CLINIC | Age: 55
End: 2020-12-10
Payer: MEDICAID

## 2020-12-10 DIAGNOSIS — J39.9 UPPER RESPIRATORY DISEASE: Primary | ICD-10-CM

## 2020-12-10 PROCEDURE — 99213 OFFICE O/P EST LOW 20 MIN: CPT | Performed by: PHYSICIAN ASSISTANT

## 2020-12-10 NOTE — TELEPHONE ENCOUNTER
Action Requested: Summary for Provider     []  Critical Lab, Recommendations Needed  [] Need Additional Advice  []   FYI    []   Need Orders  [] Need Medications Sent to Pharmacy  []  Other     SUMMARY: patient complains of sudden onset body aches, woke he

## 2020-12-11 ENCOUNTER — LAB ENCOUNTER (OUTPATIENT)
Dept: LAB | Age: 55
End: 2020-12-11
Attending: PHYSICIAN ASSISTANT
Payer: MEDICAID

## 2020-12-11 DIAGNOSIS — J39.9 UPPER RESPIRATORY DISEASE: ICD-10-CM

## 2020-12-12 ENCOUNTER — PATIENT MESSAGE (OUTPATIENT)
Dept: FAMILY MEDICINE CLINIC | Facility: CLINIC | Age: 55
End: 2020-12-12

## 2020-12-12 ENCOUNTER — MOBILE ENCOUNTER (OUTPATIENT)
Dept: FAMILY MEDICINE CLINIC | Facility: CLINIC | Age: 55
End: 2020-12-12

## 2020-12-12 RX ORDER — AZITHROMYCIN 250 MG/1
TABLET, FILM COATED ORAL
Qty: 6 TABLET | Refills: 0 | Status: SHIPPED | OUTPATIENT
Start: 2020-12-12 | End: 2020-12-21

## 2020-12-12 NOTE — PROGRESS NOTES
HPI: HPI    I spoke to Katy Abrams via video call, verified date of birth, and discussed current concerns. Onset/duration of current symptoms: today.     Common COVID-19 symptoms per CDC: CDC Clinical Guidance  • Fever:     Yes []     No [x] Vitamins-Minerals (CENTRUM) Oral Liquid Take  by mouth. • aspirin 81 MG Oral Chew Tab Chew 81 mg by mouth daily. Allergies:     Hydromorphone           NAUSEA AND VOMITING    Comment:Given while in ER.   Sumatriptan             OTHER (SEE COMMEN Maternal Grandfather    • Prostate Cancer Paternal Grandfather    • Colon Cancer Paternal Grandfather    • Breast Cancer Sister 39   • Breast Cancer Paternal Grandmother         66's   • Cancer Paternal Grandmother        Social History:   Social History Care: Not Asked        Exercise: Not Asked        Bike Helmet: Not Asked        Seat Belt: Not Asked        Self-Exams: Not Asked    Social History Narrative      Not on file      Review of Systems:   Review of Systems   Constitutional: Positive for chills

## 2020-12-13 ENCOUNTER — TELEPHONE (OUTPATIENT)
Dept: FAMILY MEDICINE CLINIC | Facility: CLINIC | Age: 55
End: 2020-12-13

## 2020-12-13 NOTE — TELEPHONE ENCOUNTER
From: Soumya Reynolds  To: Tre Quiñones PA-C  Sent: 12/12/2020 10:19 PM CST  Subject: Other    Good evening,    Dr. Mcallister Round, my throat started getting sore this evening. Can you please contact me on Monday?     Thanks

## 2020-12-13 NOTE — PROGRESS NOTES
Physician on call note. Patient calling stating that she had Covid testing ordered and result is pending. Started to develop a sore throat and is concerned and requesting antibiotics. Patient nice chest pain, shortness of breath or dizziness.   Z pack sen

## 2020-12-14 ENCOUNTER — PATIENT MESSAGE (OUTPATIENT)
Dept: FAMILY MEDICINE CLINIC | Facility: CLINIC | Age: 55
End: 2020-12-14

## 2020-12-14 ENCOUNTER — NURSE TRIAGE (OUTPATIENT)
Dept: FAMILY MEDICINE CLINIC | Facility: CLINIC | Age: 55
End: 2020-12-14

## 2020-12-14 NOTE — TELEPHONE ENCOUNTER
From: Marce Us  To: Eren Greene PA-C  Sent: 12/14/2020 4:20 AM CST  Subject: Test Results Question    I have a question about SARS-CoV-2 BY PCR (COVID-19) resulted on 12/13/20, 11:05 PM.    Good morning,    What do I do next?

## 2020-12-14 NOTE — TELEPHONE ENCOUNTER
Action Requested: Summary for Provider     []  Critical Lab, Recommendations Needed  [x] Need Additional Advice  []   FYI    []   Need Orders  [] Need Medications Sent to Pharmacy  []  Other     SUMMARY: Patient received results, confirmed +Covid.  Requesti

## 2020-12-14 NOTE — TELEPHONE ENCOUNTER
----- Message from Kanwal Haskins sent at 12/14/2020  4:20 AM CST -----  Regarding: Test Results Question  Contact: [phone]  I have a question about SARS-CoV-2 BY PCR (COVID-19) resulted on 12/13/20, 11:05 PM.    Good morning,    What do I do

## 2020-12-15 ENCOUNTER — TELEPHONE (OUTPATIENT)
Dept: FAMILY MEDICINE CLINIC | Facility: CLINIC | Age: 55
End: 2020-12-15

## 2020-12-15 RX ORDER — OMEPRAZOLE 20 MG/1
20 CAPSULE, DELAYED RELEASE ORAL
Qty: 90 CAPSULE | Refills: 1 | Status: SHIPPED | OUTPATIENT
Start: 2020-12-15 | End: 2021-10-11 | Stop reason: ALTCHOICE

## 2020-12-15 NOTE — TELEPHONE ENCOUNTER
Ant Gallegos    Patient requesting refill of below medication. Please review and sign below pended order if appropriate. Thank you    LOV 9/11/2020  LR 9/11/2020    omeprazole 20 MG Oral Capsule Delayed Release 30 capsule 2 9/11/2020     Sig - Route:  Take 1

## 2020-12-15 NOTE — TELEPHONE ENCOUNTER
Called and discussed with patient regarding her symptoms. Supportive care discussed with patient. Advise patient to proceed to ER if SOB or difficult breathing. Patient verbalized understanding.

## 2020-12-15 NOTE — TELEPHONE ENCOUNTER
•  omeprazole 20 MG Oral Capsule Delayed Release, Take 1 capsule (20 mg total) by mouth every morning., Disp: 30 capsule, Rfl: 2

## 2020-12-16 NOTE — TELEPHONE ENCOUNTER
Triage team will monitor patient . Please DO NOT close this encounter. What  was your temp today? - yes, no temp    How did you take your temp?     with an oral thermometer    Are you feeling short of breath today?    No      Is the shortness of dayne

## 2020-12-21 NOTE — TELEPHONE ENCOUNTER
What  was your temp today? -     How did you take your temp? Did not take    Are you feeling short of breath today? No      Is the shortness of breath better, the same, or worse than yesterday?   n/a    Are you having a cough today?    Yes    Is the coug

## 2020-12-22 NOTE — TELEPHONE ENCOUNTER
Called and discussed with patient regarding her condition. Patient states that her sore throat is worse, pain when swallowing. Patient took Hauula Maclachlan only 2 days and dropped the rest of medication into toilet accidentally. Zpak sent to pharmacy.  Continue with beal

## 2020-12-24 NOTE — TELEPHONE ENCOUNTER
Triage team will monitor patient . Please DO NOT close this encounter    Left message to call back on patient's voicemail.  First attempt

## 2020-12-29 ENCOUNTER — TELEPHONE (OUTPATIENT)
Dept: GASTROENTEROLOGY | Facility: CLINIC | Age: 55
End: 2020-12-29

## 2021-01-02 ENCOUNTER — TELEPHONE (OUTPATIENT)
Dept: FAMILY MEDICINE CLINIC | Facility: CLINIC | Age: 56
End: 2021-01-02

## 2021-01-02 NOTE — TELEPHONE ENCOUNTER
Patient called requesting a refill for omeprazole 20 mg. Patient advised that refill sent on 12/15/20 by ZEE Moyer to pharmacy.    Advised patient to call pharmacy so they can locate refill    Encouraged patient to call back if having problems

## 2021-01-25 RX ORDER — CETIRIZINE HYDROCHLORIDE 10 MG/1
TABLET ORAL
Qty: 90 TABLET | Refills: 3 | Status: SHIPPED | OUTPATIENT
Start: 2021-01-25

## 2021-01-26 ENCOUNTER — NURSE TRIAGE (OUTPATIENT)
Dept: FAMILY MEDICINE CLINIC | Facility: CLINIC | Age: 56
End: 2021-01-26

## 2021-01-26 ENCOUNTER — TELEPHONE (OUTPATIENT)
Dept: GASTROENTEROLOGY | Facility: CLINIC | Age: 56
End: 2021-01-26

## 2021-01-26 NOTE — TELEPHONE ENCOUNTER
LOV 09/20/2020    Colon/EGD 08/28/2020    Pt is taking her omeprazole everyday    She is experiencing pain in the center of her ribcage. She is worried it might be gallbladder issues    The pain started about 2 weeks ago.     Pt avoids eating because she

## 2021-01-26 NOTE — TELEPHONE ENCOUNTER
Action Requested: Summary for Provider     []  Critical Lab, Recommendations Needed  [] Need Additional Advice  [x]   FYI    []   Need Orders  [] Need Medications Sent to Pharmacy  []  Other     SUMMARY: Spoke with patient ( verified)--reports \"burning

## 2021-01-26 NOTE — TELEPHONE ENCOUNTER
Patient contacted, she has pain in the pit of the stomach area again just below the breastbone. This is intermittent. She is worried about her gallbladder. We previously treated with antibiotics for lower abdominal pain for presumed diverticulitis.   She

## 2021-01-27 NOTE — TELEPHONE ENCOUNTER
Noted. Patient is scheduled for the CT Scan.     Future Appointments   Date Time Provider Eneida Davis   1/31/2021  9:00 AM [facility] CT RM1 CARD [address]ipomo

## 2021-02-02 ENCOUNTER — APPOINTMENT (OUTPATIENT)
Dept: CT IMAGING | Facility: HOSPITAL | Age: 56
End: 2021-02-02
Attending: EMERGENCY MEDICINE
Payer: COMMERCIAL

## 2021-02-02 ENCOUNTER — TELEPHONE (OUTPATIENT)
Dept: GASTROENTEROLOGY | Facility: CLINIC | Age: 56
End: 2021-02-02

## 2021-02-02 ENCOUNTER — HOSPITAL ENCOUNTER (EMERGENCY)
Facility: HOSPITAL | Age: 56
Discharge: HOME | End: 2021-02-02
Attending: EMERGENCY MEDICINE
Payer: COMMERCIAL

## 2021-02-02 VITALS
SYSTOLIC BLOOD PRESSURE: 116 MMHG | WEIGHT: 194 LBS | HEART RATE: 96 BPM | TEMPERATURE: 98 F | DIASTOLIC BLOOD PRESSURE: 84 MMHG | OXYGEN SATURATION: 97 % | BODY MASS INDEX: 32 KG/M2 | RESPIRATION RATE: 18 BRPM

## 2021-02-02 DIAGNOSIS — R10.13 ABDOMINAL PAIN, EPIGASTRIC: Primary | ICD-10-CM

## 2021-02-02 DIAGNOSIS — R10.10 PAIN OF UPPER ABDOMEN: Primary | ICD-10-CM

## 2021-02-02 LAB
ALBUMIN SERPL-MCNC: 4.3 G/DL (ref 3.4–5)
ALP LIVER SERPL-CCNC: 83 U/L
ALT SERPL-CCNC: 32 U/L
ANION GAP SERPL CALC-SCNC: 5 MMOL/L (ref 0–18)
AST SERPL-CCNC: 18 U/L (ref 15–37)
BACTERIA UR QL AUTO: NEGATIVE /HPF
BACTERIA UR QL AUTO: NEGATIVE /HPF
BASOPHILS # BLD AUTO: 0.03 X10(3) UL (ref 0–0.2)
BASOPHILS NFR BLD AUTO: 0.4 %
BILIRUB DIRECT SERPL-MCNC: 0.2 MG/DL (ref 0–0.2)
BILIRUB SERPL-MCNC: 0.8 MG/DL (ref 0.1–2)
BILIRUB UR QL: NEGATIVE
BILIRUB UR QL: NEGATIVE
BUN BLD-MCNC: 16 MG/DL (ref 7–18)
BUN/CREAT SERPL: 15.7 (ref 10–20)
CALCIUM BLD-MCNC: 9.8 MG/DL (ref 8.5–10.1)
CHLORIDE SERPL-SCNC: 106 MMOL/L (ref 98–112)
CLARITY UR: CLEAR
CLARITY UR: CLEAR
CO2 SERPL-SCNC: 29 MMOL/L (ref 21–32)
COLOR UR: YELLOW
COLOR UR: YELLOW
CREAT BLD-MCNC: 1.02 MG/DL
DEPRECATED RDW RBC AUTO: 43.6 FL (ref 35.1–46.3)
EOSINOPHIL # BLD AUTO: 0.14 X10(3) UL (ref 0–0.7)
EOSINOPHIL NFR BLD AUTO: 1.9 %
ERYTHROCYTE [DISTWIDTH] IN BLOOD BY AUTOMATED COUNT: 14.1 % (ref 11–15)
GLUCOSE BLD-MCNC: 93 MG/DL (ref 70–99)
GLUCOSE UR-MCNC: NEGATIVE MG/DL
GLUCOSE UR-MCNC: NEGATIVE MG/DL
HCT VFR BLD AUTO: 43.6 %
HGB BLD-MCNC: 13.8 G/DL
HGB UR QL STRIP.AUTO: NEGATIVE
HGB UR QL STRIP.AUTO: NEGATIVE
IMM GRANULOCYTES # BLD AUTO: 0.01 X10(3) UL (ref 0–1)
IMM GRANULOCYTES NFR BLD: 0.1 %
KETONES UR-MCNC: NEGATIVE MG/DL
KETONES UR-MCNC: NEGATIVE MG/DL
LIPASE SERPL-CCNC: 135 U/L (ref 73–393)
LYMPHOCYTES # BLD AUTO: 3.04 X10(3) UL (ref 1–4)
LYMPHOCYTES NFR BLD AUTO: 41.9 %
M PROTEIN MFR SERPL ELPH: 7.9 G/DL (ref 6.4–8.2)
MCH RBC QN AUTO: 26.8 PG (ref 26–34)
MCHC RBC AUTO-ENTMCNC: 31.7 G/DL (ref 31–37)
MCV RBC AUTO: 84.8 FL
MONOCYTES # BLD AUTO: 0.5 X10(3) UL (ref 0.1–1)
MONOCYTES NFR BLD AUTO: 6.9 %
NEUTROPHILS # BLD AUTO: 3.53 X10 (3) UL (ref 1.5–7.7)
NEUTROPHILS # BLD AUTO: 3.53 X10(3) UL (ref 1.5–7.7)
NEUTROPHILS NFR BLD AUTO: 48.8 %
NITRITE UR QL STRIP.AUTO: NEGATIVE
NITRITE UR QL STRIP.AUTO: NEGATIVE
OSMOLALITY SERPL CALC.SUM OF ELEC: 291 MOSM/KG (ref 275–295)
PH UR: 6 [PH] (ref 5–8)
PH UR: 6 [PH] (ref 5–8)
PLATELET # BLD AUTO: 265 10(3)UL (ref 150–450)
POTASSIUM SERPL-SCNC: 3.4 MMOL/L (ref 3.5–5.1)
PROT UR-MCNC: NEGATIVE MG/DL
PROT UR-MCNC: NEGATIVE MG/DL
RBC # BLD AUTO: 5.14 X10(6)UL
RBC #/AREA URNS AUTO: 1 /HPF
RBC #/AREA URNS AUTO: 1 /HPF
SODIUM SERPL-SCNC: 140 MMOL/L (ref 136–145)
SP GR UR STRIP: 1.01 (ref 1–1.03)
SP GR UR STRIP: 1.01 (ref 1–1.03)
UROBILINOGEN UR STRIP-ACNC: <2
UROBILINOGEN UR STRIP-ACNC: <2
WBC # BLD AUTO: 7.3 X10(3) UL (ref 4–11)
WBC #/AREA URNS AUTO: 4 /HPF
WBC #/AREA URNS AUTO: 6 /HPF

## 2021-02-02 PROCEDURE — 96374 THER/PROPH/DIAG INJ IV PUSH: CPT

## 2021-02-02 PROCEDURE — 81001 URINALYSIS AUTO W/SCOPE: CPT | Performed by: EMERGENCY MEDICINE

## 2021-02-02 PROCEDURE — 87491 CHLMYD TRACH DNA AMP PROBE: CPT | Performed by: EMERGENCY MEDICINE

## 2021-02-02 PROCEDURE — 74177 CT ABD & PELVIS W/CONTRAST: CPT | Performed by: EMERGENCY MEDICINE

## 2021-02-02 PROCEDURE — 87086 URINE CULTURE/COLONY COUNT: CPT | Performed by: EMERGENCY MEDICINE

## 2021-02-02 PROCEDURE — 80076 HEPATIC FUNCTION PANEL: CPT | Performed by: EMERGENCY MEDICINE

## 2021-02-02 PROCEDURE — S0028 INJECTION, FAMOTIDINE, 20 MG: HCPCS | Performed by: EMERGENCY MEDICINE

## 2021-02-02 PROCEDURE — 99284 EMERGENCY DEPT VISIT MOD MDM: CPT

## 2021-02-02 PROCEDURE — 85025 COMPLETE CBC W/AUTO DIFF WBC: CPT

## 2021-02-02 PROCEDURE — 80048 BASIC METABOLIC PNL TOTAL CA: CPT

## 2021-02-02 PROCEDURE — 83690 ASSAY OF LIPASE: CPT | Performed by: EMERGENCY MEDICINE

## 2021-02-02 PROCEDURE — 87591 N.GONORRHOEAE DNA AMP PROB: CPT | Performed by: EMERGENCY MEDICINE

## 2021-02-02 PROCEDURE — 85025 COMPLETE CBC W/AUTO DIFF WBC: CPT | Performed by: EMERGENCY MEDICINE

## 2021-02-02 PROCEDURE — 80048 BASIC METABOLIC PNL TOTAL CA: CPT | Performed by: EMERGENCY MEDICINE

## 2021-02-02 RX ORDER — LIDOCAINE HYDROCHLORIDE 20 MG/ML
5 SOLUTION OROPHARYNGEAL ONCE
Status: COMPLETED | OUTPATIENT
Start: 2021-02-02 | End: 2021-02-02

## 2021-02-02 RX ORDER — ZOLPIDEM TARTRATE 10 MG/1
10 TABLET ORAL NIGHTLY PRN
Qty: 7 TABLET | Refills: 0 | Status: SHIPPED | OUTPATIENT
Start: 2021-02-02 | End: 2021-11-11

## 2021-02-02 RX ORDER — MAGNESIUM HYDROXIDE/ALUMINUM HYDROXICE/SIMETHICONE 120; 1200; 1200 MG/30ML; MG/30ML; MG/30ML
30 SUSPENSION ORAL ONCE
Status: COMPLETED | OUTPATIENT
Start: 2021-02-02 | End: 2021-02-02

## 2021-02-02 RX ORDER — FAMOTIDINE 10 MG/ML
20 INJECTION, SOLUTION INTRAVENOUS ONCE
Status: COMPLETED | OUTPATIENT
Start: 2021-02-02 | End: 2021-02-02

## 2021-02-02 RX ORDER — SUCRALFATE 1 G/1
1 TABLET ORAL
Qty: 40 TABLET | Refills: 0 | Status: SHIPPED | OUTPATIENT
Start: 2021-02-02 | End: 2021-02-12
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
## 2021-02-03 LAB
C TRACH DNA SPEC QL NAA+PROBE: NEGATIVE
N GONORRHOEA DNA SPEC QL NAA+PROBE: NEGATIVE

## 2021-02-03 NOTE — TELEPHONE ENCOUNTER
Dr. Yonatan Silvestre    Patient given information about scheduling ultrasound as advised. Patient stated at time of call that her pain is severe again 8/10.   I told her to go to the ED since pain severe, but she does not want to go because she states that they jus

## 2021-02-03 NOTE — TELEPHONE ENCOUNTER
Dr. Anahi Mitchell    Pt reports pain is a 5/10 constant pain \"right where my gallbladder is\". Patient asking about antibiotics because they made her feel better last time.   Middle back and shoulder  hurts because of pain-Pt reports ED MD talked to her about it

## 2021-02-03 NOTE — TELEPHONE ENCOUNTER
Instructions given to RN, plan to have pt get ultrasound  No diverticulitis noted /hold on antibiotics

## 2021-02-03 NOTE — TELEPHONE ENCOUNTER
I spoke to Dr. Yudelka Ovalle about below. Advised patient try clear liquid diet for 2 days. I spoke to patient and voiced understanding about clear liquid diet (what it includes) and to try this for 2 days.     She is calling central scheduling now to schedul

## 2021-02-04 NOTE — TELEPHONE ENCOUNTER
Patient scheduled ultrasound as advised.     Future Appointments   Date Time Provider Eneida Davis   2/23/2021  7:30 AM [facility] [address]   4/5/2021  3:20 PM Hina Giblert MD [facility] [place]

## 2021-02-04 NOTE — ED PROVIDER NOTES
Patient Seen in: [facility] AND [facility] Emergency Department    History   Patient presents with:  Abdomen/Flank Pain    Stated Complaint: abdominal pain and nausea    HPI    Patient complains of mid upper abdominal pain that began 2 weeks ago. .  Pain describe (three) times daily as needed. metRONIDAZOLE 500 MG Oral Tab,  Take 1 tablet (500 mg total) by mouth 2 (two) times a day.    AMLODIPINE BESYLATE 2.5 MG Oral Tab,  TAKE 1 TABLET BY MOUTH DAILY   zolpidem (AMBIEN) 5 MG Oral Tab,  Take 1 tablet (5 mg total) Temp src    SpO2 98 %   O2 Device        Current:/84   Pulse 96   Temp 98 °F (36.7 °C)   Resp 18   Wt 88 kg   SpO2 97%   BMI 32.28 kg/m²   Pulse ox Nl on room air          Physical Exam  General Appearance: alert, no distress  Eyes: pupils equal an tests on the individual orders.    RAINBOW DRAW BLUE   RAINBOW DRAW LAVENDER   RAINBOW DRAW LIGHT GREEN   RAINBOW DRAW GOLD   CHLAMYDIA/GONOCOCCUS, LEONOR   URINE CULTURE, ROUTINE   CBC W/ DIFFERENTIAL       MDM           Monitor Interpretation:      Radiology

## 2021-02-15 ENCOUNTER — TELEPHONE (OUTPATIENT)
Dept: GASTROENTEROLOGY | Facility: CLINIC | Age: 56
End: 2021-02-15

## 2021-02-15 NOTE — TELEPHONE ENCOUNTER
Patient called in stating  She is still having pain in her stomach and back. Patient is also wondering if an order can be put in to check her pancreas as well.  Please follow up

## 2021-02-15 NOTE — TELEPHONE ENCOUNTER
Dr. Vida Coronado    Patient is still having abdominal pain that radiates to mid back. Concerned about pancreas because of her discussion with the ED physician/location of the pain.   She is scheduled for ultrasound of gallbladder on 2/23/21 and is wondering i

## 2021-02-17 NOTE — TELEPHONE ENCOUNTER
The ultrasound is to look for gallstones which a CT scan sometimes can miss.       The CT is better for pancreas and she had a recent exam ( feb 2nd) with iv contrast - see below as the pancreas appeared normal.  I do not feel that an ultrasound of the panc acetabular roof, compatible with bone islands. No suspicious bone lesion. LUNG BASES:  No visible pulmonary or pleural disease. OTHER:             Negative.

## 2021-02-17 NOTE — TELEPHONE ENCOUNTER
I reviewed below complete message from Dr. Yudelka Ovalle with the patient and she voiced understanding.

## 2021-02-23 ENCOUNTER — HOSPITAL ENCOUNTER (OUTPATIENT)
Dept: ULTRASOUND IMAGING | Facility: HOSPITAL | Age: 56
Discharge: HOME | End: 2021-02-23
Attending: INTERNAL MEDICINE
Payer: COMMERCIAL

## 2021-02-23 ENCOUNTER — TELEPHONE (OUTPATIENT)
Dept: GASTROENTEROLOGY | Facility: CLINIC | Age: 56
End: 2021-02-23

## 2021-02-23 DIAGNOSIS — R10.10 PAIN OF UPPER ABDOMEN: ICD-10-CM

## 2021-02-23 DIAGNOSIS — K82.4 GALLBLADDER POLYP: Primary | ICD-10-CM

## 2021-02-23 PROCEDURE — 76705 ECHO EXAM OF ABDOMEN: CPT | Performed by: INTERNAL MEDICINE

## 2021-02-23 NOTE — TELEPHONE ENCOUNTER
Dr. Yudelka Ovalle    Please advise on result of ultrasound of gallbladder done today and next steps.     Thank you

## 2021-02-24 NOTE — TELEPHONE ENCOUNTER
Dr. Iman Washington    I was going to call [facility], but saw that she sent a [employer] message stating that she is still having pain. Is there anything else you would recommend since Ultrasound ok per below message?     Thank you    Brandy Cherry Pa

## 2021-02-25 RX ORDER — METRONIDAZOLE 250 MG/1
250 TABLET ORAL 2 TIMES DAILY
Qty: 14 TABLET | Refills: 0 | Status: SHIPPED | OUTPATIENT
Start: 2021-02-25 | End: 2021-04-12

## 2021-02-26 ENCOUNTER — PATIENT MESSAGE (OUTPATIENT)
Dept: FAMILY MEDICINE CLINIC | Facility: CLINIC | Age: 56
End: 2021-02-26

## 2021-02-26 ENCOUNTER — TELEPHONE (OUTPATIENT)
Dept: FAMILY MEDICINE CLINIC | Facility: CLINIC | Age: 56
End: 2021-02-26

## 2021-02-26 NOTE — TELEPHONE ENCOUNTER
RN pls call pt and triage or offer ov if needed, thanks. Signed           From: Soumya Reynolds  To:  Lawyer Miranda DO  Sent: 2/26/2021  6:55 AM CST  Subject: Other    Good morning,    I did speak with Dr. Kunz Service on last evening, He

## 2021-02-26 NOTE — TELEPHONE ENCOUNTER
Patient states she's spoken with Dr. Adrienne Parker regarding her upper back and right flank pain (Gallbladder polyp), but informed to go through Dr. Prince Fountain for pain medication.

## 2021-02-26 NOTE — TELEPHONE ENCOUNTER
See TE 2-26-21.     Future Appointments   Date Time Provider Eneida Davis   4/5/2021  3:20 PM Orlando Yung MD [facility]

## 2021-02-26 NOTE — TELEPHONE ENCOUNTER
From: Brianne Quiros  To: José Miguel DO [place]  Sent: 2/26/2021 6:55 AM CST  Subject: Other    Good morning,    I did speak with Dr. Jerzy Auguste on last evening, He informed Me he's going to run one more test for My Gallbladder. Richard Meadows  He also stated he

## 2021-02-26 NOTE — TELEPHONE ENCOUNTER
The patient was contacted, she continues to experience pain in the abdomen from time to time care. Today was a good day. She does have bloating gas and upper abdominal discomfort.   She has had pain over the gallbladder from time to time as well which can

## 2021-02-27 NOTE — TELEPHONE ENCOUNTER
If she has not tried extra strength Tylenol (2 tabs to be taken every 6 hours as needed for pain), she should start there.   If this does not suffice it is okay for her to take ibuprofen 400 mg tablets so long as she takes it during a true meal.  This can b

## 2021-02-27 NOTE — TELEPHONE ENCOUNTER
Patient states she has not tried any pain medication. Per patient, she was advised to take Ibuprofen but did not start due to her stomach. Patient would like Dr. Sadia Jones recommendations regarding pain medications.      Dr. Duane Mackintosh:   Please advise

## 2021-02-27 NOTE — TELEPHONE ENCOUNTER
Informed patient of advice per Dr. Bethany Brownlee. Patient states she has taken Tylenol extra strength which does not help. Patient states she will try the Ibuprofen with meals and will call back if not helping.

## 2021-03-02 NOTE — TELEPHONE ENCOUNTER
Patient contacted and states Dr. Iman Washington wants her to go for another test on her gallbladder. Order for GB hepatobiliary scan in computer. Number for Central Scheduling given. Patient voiced understanding.

## 2021-03-02 NOTE — TELEPHONE ENCOUNTER
Patient tried to alternate the Tylenol and Ibuprofen as directed with no relief. Her pain is a 7/10. She states the pain starts where her gallbladder is located and and radiates to the center of her back.      She is wondering what else she can take to help

## 2021-03-04 ENCOUNTER — TELEMEDICINE (OUTPATIENT)
Dept: FAMILY MEDICINE CLINIC | Facility: CLINIC | Age: 56
End: 2021-03-04

## 2021-03-04 DIAGNOSIS — M99.02 THORACIC REGION SOMATIC DYSFUNCTION: ICD-10-CM

## 2021-03-04 DIAGNOSIS — K82.4 POLYP OF GALLBLADDER: ICD-10-CM

## 2021-03-04 DIAGNOSIS — M54.6 ACUTE RIGHT-SIDED THORACIC BACK PAIN: Primary | ICD-10-CM

## 2021-03-04 PROCEDURE — 99214 OFFICE O/P EST MOD 30 MIN: CPT | Performed by: FAMILY MEDICINE

## 2021-03-04 RX ORDER — CYCLOBENZAPRINE HCL 10 MG
10 TABLET ORAL NIGHTLY
Qty: 20 TABLET | Refills: 0 | Status: SHIPPED | OUTPATIENT
Start: 2021-03-04 | End: 2021-03-24

## 2021-03-04 NOTE — TELEPHONE ENCOUNTER
Spoke with patient ( verified) and relayed Dr. Debi Slater message below--patient verbalizes understanding, but just started a new job and cannot take off work (Monday through Friday).     Patient agreeable to video visit today at same time today if appro

## 2021-03-04 NOTE — PATIENT INSTRUCTIONS
Patient to come in on May 8, 2021 at 6:20 PM as a double book for osteopathic manipulation. Cyclobenzaprine has been prescribed at 10 mg to be taken nightly for muscle relaxation. Topical sports cream may be of some benefit.   Keep all specialist appointm

## 2021-03-04 NOTE — TELEPHONE ENCOUNTER
Can you see if this patient can come in on today March 4, 2021.   You can double book the 1:20 PM appointment for her but tell her to be at the clinic at 1:00 PM.

## 2021-03-04 NOTE — PROGRESS NOTES
HPI:    Patient ID: Destiny Jones is a 54year old female.     This patient is a 49-year-old -American female who consents to a virtual video visit secondary to a persistent right upper quadrant pain that seems to radiate through to her back 15 tablet 0   • Potassium Chloride ER 20 MEQ Oral Tab CR Take 1 tablet by mouth daily. • omeprazole 20 MG Oral Capsule Delayed Release Take 1 capsule (20 mg total) by mouth every morning.  30 capsule 2   • Acetaminophen-Codeine (TYLENOL WITH CODEINE #3) cyclobenzaprine 10 MG Oral Tab 20 tablet 0     Sig: Take 1 tablet (10 mg total) by mouth nightly for 20 days.        Imaging & Referrals:  None  Patient Instructions   Patient to come in on May 8, 2021 at 6:20 PM as a double book for osteopathic manipulatio

## 2021-03-04 NOTE — TELEPHONE ENCOUNTER
Spoke with patient ( verified) and relayed message below--patient verbalizes understanding and agreement. Patient scheduled for video visit today at 1 p.m. per Dr. Ace Pulliam. Patient advised to complete the e-check in in [employer], if active.   Anita Pérez

## 2021-03-10 ENCOUNTER — TELEPHONE (OUTPATIENT)
Dept: OTHER | Age: 56
End: 2021-03-10

## 2021-03-10 NOTE — TELEPHONE ENCOUNTER
Received call from [place]. 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  All of her doctors and specialists are currently through [facility].

## 2021-03-11 NOTE — TELEPHONE ENCOUNTER
The patient was called and informed. She received the letter yesterday and she stated is very grateful. She stated she is very grateful for Dr. Allan Garner and all that we do.

## 2021-03-11 NOTE — TELEPHONE ENCOUNTER
Note has been written and has been sent to my chart. The patient can obtain the letter signed from my chart. Please note.

## 2021-03-29 ENCOUNTER — TELEPHONE (OUTPATIENT)
Dept: GASTROENTEROLOGY | Facility: CLINIC | Age: 56
End: 2021-03-29

## 2021-03-29 NOTE — TELEPHONE ENCOUNTER
Overdue reminder letter mailed out to patient.      Imaging:    NM GB HEPATOBILIARY SCAN WITH PHARMACY

## 2021-03-30 RX ORDER — METRONIDAZOLE 250 MG/1
250 TABLET ORAL 2 TIMES DAILY
Qty: 14 TABLET | Refills: 0 | OUTPATIENT
Start: 2021-03-30

## 2021-03-30 NOTE — TELEPHONE ENCOUNTER
Nursing: We do not typically refill antibiotics.   What is the reason for this request?  If the patient is having acute symptoms she should be seen in the office

## 2021-03-31 NOTE — TELEPHONE ENCOUNTER
[facility] (Dr. Marily Hermosillo out of office) Routed to Dr. Marily Hermosillo as FYI    Patient states she continues to have abdominal pain of 6/10. Her stools are normal denies any diarrhea or constipation. She requested the flagyl because she continues to have symptoms. She is requesting an increased dosage since the 250mg did not seem to help much. Acknowledges she needs office appointment. However, she only has insurance coverage to be seen through [facility] right now. She states that she is working on an appeal right now with the assistance of Dr. Alton Crowe so she can be seen by our office. Please advise.     Thank you

## 2021-03-31 NOTE — TELEPHONE ENCOUNTER
Nursing: It appears from Dr. Raven Arizmendi prior notes that he empirically treated her with Flagyl for possible bacterial overgrowth. This is not a medication that is typically refilled. Given that she has continued to have symptoms, I would not be comfortable refilling or increasing antibiotics at this time.   She should be seen in follow-up to discuss further

## 2021-04-05 ENCOUNTER — OFFICE VISIT (OUTPATIENT)
Dept: OBGYN CLINIC | Facility: CLINIC | Age: 56
End: 2021-04-05
Payer: MEDICAID

## 2021-04-05 VITALS
SYSTOLIC BLOOD PRESSURE: 119 MMHG | HEART RATE: 88 BPM | BODY MASS INDEX: 34 KG/M2 | DIASTOLIC BLOOD PRESSURE: 82 MMHG | WEIGHT: 202.19 LBS

## 2021-04-05 DIAGNOSIS — Z01.419 ENCOUNTER FOR GYNECOLOGICAL EXAMINATION WITHOUT ABNORMAL FINDING: Primary | ICD-10-CM

## 2021-04-05 PROBLEM — Z13.71 BRCA NEGATIVE: Status: ACTIVE | Noted: 2021-04-05

## 2021-04-05 PROCEDURE — 3079F DIAST BP 80-89 MM HG: CPT | Performed by: OBSTETRICS & GYNECOLOGY

## 2021-04-05 PROCEDURE — 99386 PREV VISIT NEW AGE 40-64: CPT | Performed by: OBSTETRICS & GYNECOLOGY

## 2021-04-05 PROCEDURE — 3074F SYST BP LT 130 MM HG: CPT | Performed by: OBSTETRICS & GYNECOLOGY

## 2021-04-05 RX ORDER — TRAMADOL HYDROCHLORIDE 50 MG/1
50 TABLET ORAL EVERY 6 HOURS PRN
COMMUNITY
Start: 2021-01-02 | End: 2021-04-15

## 2021-04-05 RX ORDER — TRAMADOL HYDROCHLORIDE 50 MG/1
TABLET ORAL
Qty: 30 TABLET | Refills: 0 | OUTPATIENT
Start: 2021-04-05

## 2021-04-05 NOTE — PROGRESS NOTES
Candie Miller is a 54year old female  No LMP recorded. (Menstrual status: Partial Hysterectomy). who presents for Patient presents with:  Gyn Exam: Annual exam   Her cycles are regular.   She has no current gyne complaints but has issues wi ENDOSCOPY,BIOPSY  08/28/2020       SOCIAL HISTORY:  Social History    Socioeconomic History      Marital status:        Spouse name: Not on file      Number of children: 3      Years of education: Not on file      Highest education level: Not on file Ex-Partner:       Emotionally Abused:       Physically Abused:       Sexually Abused:     FAMILY HISTORY:  Family History   Problem Relation Age of Onset   • Hypertension Father    • Breast Cancer Mother 46   • Prostate Cancer Maternal Grandfather 61   • C tablet (10 mg total) by mouth nightly as needed for Sleep. (Patient not taking: Reported on 4/5/2021 ), Disp: 7 tablet, Rfl: 0    ALLERGIES:    Hydromorphone           NAUSEA AND VOMITING    Comment:Given while in ER.   Sumatriptan             OTHER (SEE CO fullness, masses or tenderness  Vagina:  Normal appearance without lesions, no abnormal discharge  Cervix:  Normal without tenderness on motion  Uterus: surgically absent  Adnexa: normal without masses or tenderness  Perineum: normal  Rectovaginal: no mass

## 2021-04-06 NOTE — TELEPHONE ENCOUNTER
Spoke with patient and she confirmed that she will follow up with us when her insurance situation is straightened out and also will talk with her PCP. She did not want to schedule a follow up appointment at this time.     MARCO A:  Dr. Viry Dover

## 2021-04-10 RX ORDER — METRONIDAZOLE 250 MG/1
250 TABLET ORAL 2 TIMES DAILY
Qty: 14 TABLET | Refills: 0 | OUTPATIENT
Start: 2021-04-10

## 2021-04-12 ENCOUNTER — TELEPHONE (OUTPATIENT)
Dept: FAMILY MEDICINE CLINIC | Facility: CLINIC | Age: 56
End: 2021-04-12

## 2021-04-12 RX ORDER — METRONIDAZOLE 250 MG/1
250 TABLET ORAL 2 TIMES DAILY
Qty: 14 TABLET | Refills: 0 | Status: SHIPPED | OUTPATIENT
Start: 2021-04-12 | End: 2021-06-06

## 2021-04-12 NOTE — TELEPHONE ENCOUNTER
Patient calling with complaint of right upper quadrant pain rated a 7/10. Per patient, she also has nausea dn decreased appetite. Per patient, she has been dealing with this issue for a while with Dr. Adrienne Parker (gastroenterology).    Per patient , Dr. Adrienne Parker

## 2021-04-29 NOTE — TELEPHONE ENCOUNTER
Called and discussed with patient regarding her symptoms. Patient ran out of Ambien. Refill Ambien. Patient verbalized understanding.

## 2021-04-29 NOTE — TELEPHONE ENCOUNTER
Dr Ace Pulliam,    See pts' mychart message. Pt called back to follow up on her request for sleeping pill. Recently pt lost two young cousins to covid. She was at the hospital a lot with one of her cousin. Pt feeling tired with inability to sleep at night.  P

## 2021-05-01 ENCOUNTER — IMMUNIZATION (OUTPATIENT)
Dept: LAB | Facility: HOSPITAL | Age: 56
End: 2021-05-01
Attending: EMERGENCY MEDICINE
Payer: COMMERCIAL

## 2021-05-01 DIAGNOSIS — Z23 NEED FOR VACCINATION: Primary | ICD-10-CM

## 2021-05-01 PROCEDURE — 0011A SARSCOV2 VAC 100MCG/0.5ML IM: CPT

## 2021-05-18 RX ORDER — AMLODIPINE BESYLATE 2.5 MG/1
2.5 TABLET ORAL DAILY
Qty: 90 TABLET | Refills: 1 | Status: SHIPPED | OUTPATIENT
Start: 2021-05-18 | End: 2021-12-07

## 2021-05-18 NOTE — TELEPHONE ENCOUNTER
[facility] needs a refill of:    • AMLODIPINE BESYLATE 2.5 MG Oral Tab TAKE 1 TABLET BY MOUTH DAILY 90 tablet 1

## 2021-06-06 ENCOUNTER — TELEPHONE (OUTPATIENT)
Dept: FAMILY MEDICINE CLINIC | Facility: CLINIC | Age: 56
End: 2021-06-06

## 2021-06-06 RX ORDER — METRONIDAZOLE 250 MG/1
250 TABLET ORAL 2 TIMES DAILY
Qty: 28 TABLET | Refills: 0 | Status: SHIPPED | OUTPATIENT
Start: 2021-06-06 | End: 2021-11-11 | Stop reason: ALTCHOICE

## 2021-06-06 NOTE — TELEPHONE ENCOUNTER
Patient paged provider at 11:41 a.m. stating she is having abdominal pain. She has no bleeding. No constipation or diarrhea. No nausea or vomiting. Has had long standing pain and is being seen by GI at this time as well.  She usually receives flagyl oral co

## 2021-06-15 ENCOUNTER — TELEPHONE (OUTPATIENT)
Dept: FAMILY MEDICINE CLINIC | Facility: CLINIC | Age: 56
End: 2021-06-15

## 2021-06-15 NOTE — TELEPHONE ENCOUNTER
Calling  and reported that she received Erminio Millin first dose vaccine on 5/1/21 at [facility] but she felt  really sick.   Patient does not want to get the Erminio Millin #2 shot,states that her second dose was cancelled and instructed her to call her PCP  when s

## 2021-06-23 RX ORDER — HYDROCHLOROTHIAZIDE 12.5 MG/1
TABLET ORAL
Qty: 90 TABLET | Refills: 3 | Status: SHIPPED | OUTPATIENT
Start: 2021-06-23

## 2021-07-02 ENCOUNTER — TELEPHONE (OUTPATIENT)
Dept: FAMILY MEDICINE CLINIC | Facility: CLINIC | Age: 56
End: 2021-07-02

## 2021-07-02 DIAGNOSIS — F51.04 PSYCHOPHYSIOLOGICAL INSOMNIA: ICD-10-CM

## 2021-07-02 RX ORDER — AMOXICILLIN 500 MG/1
500 CAPSULE ORAL 3 TIMES DAILY
Qty: 30 CAPSULE | Refills: 0 | Status: SHIPPED | OUTPATIENT
Start: 2021-07-02 | End: 2021-07-12

## 2021-07-02 RX ORDER — TRAMADOL HYDROCHLORIDE 50 MG/1
TABLET ORAL
Qty: 30 TABLET | Refills: 0 | Status: SHIPPED | OUTPATIENT
Start: 2021-07-02 | End: 2021-07-07

## 2021-07-02 RX ORDER — ZOLPIDEM TARTRATE 10 MG/1
TABLET ORAL
Qty: 30 TABLET | Refills: 0 | Status: SHIPPED | OUTPATIENT
Start: 2021-07-02 | End: 2021-12-16 | Stop reason: ALTCHOICE

## 2021-07-02 NOTE — TELEPHONE ENCOUNTER
Patient calling reports having sinus pain , pressure, nasal congestion , runny nose, headache,  sligh dry cough, onset of  2 weeks     Attempted to triage , patient requesting medication , and to send info to [place]   \" I usually see Nikhil \"     Hx of sinus

## 2021-07-04 NOTE — TELEPHONE ENCOUNTER
Called and discussed with patient on 07/03 at 12: 00 pm regarding her symptoms. Amoxicillin 875 mg PO BID for 10 days sent to pharmacy. Patient verbalized understanding.

## 2021-07-07 ENCOUNTER — PATIENT MESSAGE (OUTPATIENT)
Dept: FAMILY MEDICINE CLINIC | Facility: CLINIC | Age: 56
End: 2021-07-07

## 2021-07-07 RX ORDER — TRAMADOL HYDROCHLORIDE 50 MG/1
50 TABLET ORAL EVERY 6 HOURS PRN
Qty: 30 TABLET | Refills: 0 | Status: SHIPPED | OUTPATIENT
Start: 2021-07-07 | End: 2021-10-12

## 2021-07-08 NOTE — TELEPHONE ENCOUNTER
Per chart review, tramadol script printed 07/2/21 at [facility] office by Dr. Anna Moreno. Please advise if ok to send to pharm as pt requested?

## 2021-07-08 NOTE — TELEPHONE ENCOUNTER
From: Celestine Mohr  To: Alesha Hudson PA-C  Sent: 7/7/2021 3:29 PM CDT  Subject: Prescription Question    Good afternoon,    I went to  the prescription for Tramadol, I was told I had to contact you. .. Can you call it in. ..     Thank

## 2021-07-21 RX ORDER — OMEPRAZOLE 20 MG/1
20 CAPSULE, DELAYED RELEASE ORAL
Qty: 90 CAPSULE | Refills: 1 | Status: SHIPPED | OUTPATIENT
Start: 2021-07-21 | End: 2021-10-11 | Stop reason: ALTCHOICE

## 2021-07-21 NOTE — TELEPHONE ENCOUNTER
Andrei Griffith    Patient requesting refill of below medication. Please review and sign below pended order if appropriate.     Thank you    LOV 9/11/2020  LR 12/15/2020    omeprazole 20 MG Oral Capsule Delayed Release 90 capsule 1 12/15/2020    Sig:   Take 1 cap

## 2021-07-21 NOTE — TELEPHONE ENCOUNTER
Current Outpatient Medications   Medication Sig Dispense Refill   • omeprazole 20 MG Oral Capsule Delayed Release Take 1 capsule (20 mg total) by mouth every morning before breakfast. 90 capsule 1

## 2021-08-12 ENCOUNTER — NURSE TRIAGE (OUTPATIENT)
Dept: FAMILY MEDICINE CLINIC | Facility: CLINIC | Age: 56
End: 2021-08-12

## 2021-08-12 DIAGNOSIS — K57.92 DIVERTICULITIS: Primary | ICD-10-CM

## 2021-08-12 NOTE — TELEPHONE ENCOUNTER
I am sorry but I am going to need some clarity regarding this prescription request.  It is not clear when I look back in previous encounters what this medication is really given for.   It appears that she was given a prescription initially by a GI specialis

## 2021-08-12 NOTE — TELEPHONE ENCOUNTER
Action Requested: Summary for Provider     []  Critical Lab, Recommendations Needed  [] Need Additional Advice  []   FYI    []   Need Orders  [] Need Medications Sent to Pharmacy  []  Other     SUMMARY:   The patient stated that she is having a flair up an

## 2021-08-13 ENCOUNTER — PATIENT MESSAGE (OUTPATIENT)
Dept: GASTROENTEROLOGY | Facility: CLINIC | Age: 56
End: 2021-08-13

## 2021-08-13 RX ORDER — METRONIDAZOLE 500 MG/1
500 TABLET ORAL 2 TIMES DAILY
Qty: 20 TABLET | Refills: 0 | Status: SHIPPED | OUTPATIENT
Start: 2021-08-13 | End: 2021-11-17

## 2021-08-13 NOTE — TELEPHONE ENCOUNTER
Dr. Alysha Wagner    Patient reports pain in center of abdomen, states that where it has been in the past.  This started up again on Wednesday.   Requesting a prescription for Flagyl since it has helped with these same symptoms in the past.  States that she spoke t

## 2021-08-13 NOTE — TELEPHONE ENCOUNTER
From: Brayden Sarkar  To: Ricky Andres. Macy Jha MD  Sent: 8/13/2021 1:12 PM CDT  Subject: Other    Good afternoon,    I was wondering if you could call in some Flagyl, My Stomach has strayed back hurting as of Wednesday, August 11, 2021. Thanks.

## 2021-08-13 NOTE — TELEPHONE ENCOUNTER
Prescription refill request has been sent to the pharmacy. Please call the patient and let her know.

## 2021-08-13 NOTE — TELEPHONE ENCOUNTER
Patient reports she has a flare up of Diverticulitis, in the past has received Flagyl from Dr. Luann Gray, was not sure who to follow up with but will follow up with Dr. Luann Gray in the future.  Patient will be leaving tomorrow for a  , please advise if ok t

## 2021-08-14 NOTE — TELEPHONE ENCOUNTER
Patient was contacted, she has symptoms again of abdominal bloating and discomfort as well as some nausea. She has gotten relief in the past from treatment with Flagyl which would suggest she may have a component of bacterial overgrowth.   This was discuss

## 2021-08-24 ENCOUNTER — TELEPHONE (OUTPATIENT)
Dept: FAMILY MEDICINE CLINIC | Facility: CLINIC | Age: 56
End: 2021-08-24

## 2021-08-24 NOTE — TELEPHONE ENCOUNTER
Spoke to pt regarding the [employer] exemption form and needing Auth if  Signs off. Sent MC to pt. Pt stated that on 5/1/21, she got her 1st dose of the Covid vaccine and had side-effects of flu-like symptoms, bad headaches for about a month or so.  D

## 2021-08-24 NOTE — TELEPHONE ENCOUNTER
Dr. Prince Fountain,    Covid Vaccine Exemption form pending. Pt stated that on 5/1/21, she got her 1st dose of the Covid vaccine and had side-effects of flu-like symptoms, bad headaches for about a month or so. Did not get 2nd dose.  Pt wants to be exempt from va

## 2021-08-25 NOTE — TELEPHONE ENCOUNTER
Dr. Bethany Brownlee,    Covid Vaccine exemption forms have been faxed to you today. Pt needs before 8/27/21. See message below.

## 2021-08-25 NOTE — TELEPHONE ENCOUNTER
Patient called and advised she was told by  or 's Office that she will need to request an Extension on the forms for the Vaccination Exemption. However, Her employer will not allow for an extension.  If the forms are not returned to her Employer 9/

## 2021-08-26 NOTE — TELEPHONE ENCOUNTER
Pt following up form. Pt is worried she may be terminated from her job. [facility] clinical staff, please follow-up if you received forms for Dr. Walter Saldana to fill out. Thanks      Please contact Pt with an update.

## 2021-08-26 NOTE — TELEPHONE ENCOUNTER
Forms have been received and signed and a narrative document has been attached. Please forward this to all pertinent parties.

## 2021-08-27 ENCOUNTER — TELEPHONE (OUTPATIENT)
Dept: GASTROENTEROLOGY | Facility: CLINIC | Age: 56
End: 2021-08-27

## 2021-08-27 NOTE — TELEPHONE ENCOUNTER
Per OV notes from Dr. Vida Coronado on 9/11/20, patient is now taking omeprazole 20 mg daily. Pantoprazole was discontinued.

## 2021-08-30 ENCOUNTER — TELEPHONE (OUTPATIENT)
Dept: GASTROENTEROLOGY | Facility: CLINIC | Age: 56
End: 2021-08-30

## 2021-08-30 RX ORDER — FAMOTIDINE 20 MG/1
20 TABLET ORAL NIGHTLY
Qty: 30 TABLET | Refills: 2 | Status: SHIPPED | OUTPATIENT
Start: 2021-08-30 | End: 2021-11-11 | Stop reason: ALTCHOICE

## 2021-08-30 RX ORDER — OMEPRAZOLE 20 MG/1
20 CAPSULE, DELAYED RELEASE ORAL EVERY MORNING
Qty: 30 CAPSULE | Refills: 2 | Status: SHIPPED | OUTPATIENT
Start: 2021-08-30 | End: 2021-10-11 | Stop reason: ALTCHOICE

## 2021-08-30 NOTE — TELEPHONE ENCOUNTER
Patient states she is experiencing severe abdominal pain radiating to the right side of her back. Patient did not go to ER over the weekend - no vomiting, nausea, no blood in stool. Please call. Thank you.

## 2021-08-30 NOTE — TELEPHONE ENCOUNTER
Dr. Jayde Coy    Patient requesting to speak to you. She reports her pain is better now than it was over the weekend. Pain is 6/10 in \"pit of stomach\" that radiates to her back. She is concerned that it is from her gallbladder.     She did not have the h

## 2021-08-30 NOTE — TELEPHONE ENCOUNTER
The patient was contacted, she has been experiencing epigastric to right upper quadrant pain for over the last year. It seemed to flareup over the weekend.   She has been off acid suppression, had been on pantoprazole 40 mg daily but could not get this ref

## 2021-09-13 ENCOUNTER — PATIENT MESSAGE (OUTPATIENT)
Dept: GASTROENTEROLOGY | Facility: CLINIC | Age: 56
End: 2021-09-13

## 2021-09-13 RX ORDER — PANTOPRAZOLE SODIUM 40 MG/1
40 TABLET, DELAYED RELEASE ORAL
Qty: 90 TABLET | Refills: 1 | Status: SHIPPED | OUTPATIENT
Start: 2021-09-13 | End: 2021-10-11 | Stop reason: ALTCHOICE

## 2021-09-13 NOTE — TELEPHONE ENCOUNTER
Dr. Imer Celis spoke to Charla Lieberman. She told me that the omeprazole and famotidine do not help with her symptoms. The only thing that helped her was the pantoprazole (had previously taken 40mg daily).     Patient requesting prescription for pantoprazole inst

## 2021-09-13 NOTE — TELEPHONE ENCOUNTER
From: Kristyn Sarkar  To: Ebony Lopez.  Carlos Barton MD  Sent: 9/13/2021 2:29 PM CDT  Subject: Refill    Good afternoon,    I received a call from [facility] [address] [facility] that you canceled the refill for the Pantoprazole, that works better than anything that

## 2021-10-09 ENCOUNTER — PATIENT MESSAGE (OUTPATIENT)
Dept: GASTROENTEROLOGY | Facility: CLINIC | Age: 56
End: 2021-10-09

## 2021-10-09 NOTE — TELEPHONE ENCOUNTER
From: Kathe Sarkar  To: Alayna See.  Yonatan Silvestre MD  Sent: 10/9/2021 9:11 AM CDT  Subject: Medicine Request    Good morning,    I was wondering if you could order Lansoprazole for Me, I purchased a small bottle over the counter due to the Famotidine 20m

## 2021-10-11 ENCOUNTER — TELEPHONE (OUTPATIENT)
Dept: FAMILY MEDICINE CLINIC | Facility: CLINIC | Age: 56
End: 2021-10-11

## 2021-10-11 ENCOUNTER — PATIENT MESSAGE (OUTPATIENT)
Dept: FAMILY MEDICINE CLINIC | Facility: CLINIC | Age: 56
End: 2021-10-11

## 2021-10-11 DIAGNOSIS — R23.3 SKIN SPOTS, RED: Primary | ICD-10-CM

## 2021-10-11 DIAGNOSIS — G43.001 MIGRAINE WITHOUT AURA AND WITH STATUS MIGRAINOSUS, NOT INTRACTABLE: ICD-10-CM

## 2021-10-11 RX ORDER — LANSOPRAZOLE 30 MG/1
30 CAPSULE, DELAYED RELEASE ORAL
Qty: 90 CAPSULE | Refills: 0 | Status: SHIPPED | OUTPATIENT
Start: 2021-10-11

## 2021-10-11 NOTE — TELEPHONE ENCOUNTER
Callum Kelly (Dr. Tao Anaya out of office)    Patient requesting prescription for lansoprazole. She is no longer taking the pantoprazole or the omeprazole. She purchased over the counter lansoprazole and it is working, but is expensive.     She did not fill the pant

## 2021-10-12 ENCOUNTER — TELEPHONE (OUTPATIENT)
Dept: GASTROENTEROLOGY | Facility: CLINIC | Age: 56
End: 2021-10-12

## 2021-10-12 RX ORDER — ATORVASTATIN CALCIUM 40 MG/1
TABLET, FILM COATED ORAL
Qty: 90 TABLET | Refills: 1 | Status: SHIPPED | OUTPATIENT
Start: 2021-10-12

## 2021-10-12 RX ORDER — TRAMADOL HYDROCHLORIDE 50 MG/1
50 TABLET ORAL EVERY 6 HOURS PRN
Qty: 30 TABLET | Refills: 0 | Status: SHIPPED | OUTPATIENT
Start: 2021-10-12

## 2021-10-12 NOTE — TELEPHONE ENCOUNTER
From: Horace Harp  To: Mary Olson PA-C  Sent: 10/11/2021 3:46 PM CDT  Subject: Refill    Good evening,    I wanted to know if you could call something in for the headaches, they have started up again. Thanks.

## 2021-10-12 NOTE — TELEPHONE ENCOUNTER
Patient calling  and reported with dx migraine,  with migraine headache flare up for a week now,  pain level 7/10, states that tramadol is the only  medication that helps with her migraine and the last time  she used and take it  was a month ago. States saray

## 2021-10-12 NOTE — TELEPHONE ENCOUNTER
Prescription sent to pharmacy and also the referral has been signed. Please call the patient and let her know.

## 2021-10-12 NOTE — TELEPHONE ENCOUNTER
Spoke with patient (name and  verified), informed of message below. Gave her phone number for Dermatology. No further questions.

## 2021-10-12 NOTE — TELEPHONE ENCOUNTER
Drug Change request    Plan does not cover medication prescribed. Per RX benefit plan alternative medications include:  Famotidine. Please fax back with approval along with strength,directions,quantity, and refills.     Current Outpatient Medications   Me

## 2021-10-12 NOTE — TELEPHONE ENCOUNTER
----- Message from Froilan Mares. Brooks Valentino sent at 10/11/2021  3:46 PM CDT -----  Regarding: Refill  Good evening,    I wanted to know if you could call something in for the headaches, they have started up again. Thanks.

## 2021-10-12 NOTE — TELEPHONE ENCOUNTER
Nursing: The patient has tried and failed multiple antiacid medications including omeprazole, pantoprazole, and famotidine. I have written a letter for prior authorization.   If is not successful, she can purchase over-the-counter lansoprazole for manageme

## 2021-10-13 NOTE — TELEPHONE ENCOUNTER
Medication PA Requested: lansoprazole 30 MG Oral Capsule Delayed Release                                                         CoverMyMeds Used:  Key:  Quantity:  90  Day Supply: 90  Sig:  Take 1 capsule (30 mg total) by mouth every morning before breakf

## 2021-10-19 NOTE — TELEPHONE ENCOUNTER
Received fax from [facility] regarding Lansoprazole request. States that \"Request for Prior Approval has been denied. We denied your request because: it does not need prior authorization if [place] is the [employer]. \"    *Per ILDPA records pa

## 2021-10-26 NOTE — TELEPHONE ENCOUNTER
Fx received from [address] [place] [address] [employer] stating it has been determined that no prior authorization is required for Lansoprazole DR 30 mg capsule.

## 2021-10-26 NOTE — TELEPHONE ENCOUNTER
Called [facility] x [facility], [phone], both times on prolonged hold, first call was dropped, second call on hold for 20min. Will try again tomorrow. 10/27/2021 Called [facility], spoke with Amado Weston, he states can put through [employer], for $6.22/30 days .   H

## 2021-10-29 ENCOUNTER — TELEPHONE (OUTPATIENT)
Dept: GASTROENTEROLOGY | Facility: CLINIC | Age: 56
End: 2021-10-29

## 2021-10-29 NOTE — TELEPHONE ENCOUNTER
Patient was contacted, she has been experiencing upper abdominal pain, epigastric discomfort, this worsens after she eats. She does have chronic looser stools she has 1 or 2 bowel movements per day been a chronic issue for her nothing new.   She denies any

## 2021-10-29 NOTE — TELEPHONE ENCOUNTER
Dr. Rhonda Paiz--    Patient last seen in clinic 09/11/2020 and scheduled for follow up 11/11/2021. Over the past week, she's getting extreme nausea and abdominal pain with food which is located directly in center of her stomach.  Ate omelette and potatoes to

## 2021-11-11 ENCOUNTER — OFFICE VISIT (OUTPATIENT)
Dept: GASTROENTEROLOGY | Facility: CLINIC | Age: 56
End: 2021-11-11
Payer: COMMERCIAL

## 2021-11-11 VITALS
HEIGHT: 65 IN | BODY MASS INDEX: 33.66 KG/M2 | DIASTOLIC BLOOD PRESSURE: 65 MMHG | SYSTOLIC BLOOD PRESSURE: 94 MMHG | WEIGHT: 202 LBS | HEART RATE: 82 BPM

## 2021-11-11 DIAGNOSIS — R10.10 PAIN OF UPPER ABDOMEN: Primary | ICD-10-CM

## 2021-11-11 PROCEDURE — 3008F BODY MASS INDEX DOCD: CPT | Performed by: INTERNAL MEDICINE

## 2021-11-11 PROCEDURE — 3078F DIAST BP <80 MM HG: CPT | Performed by: INTERNAL MEDICINE

## 2021-11-11 PROCEDURE — 99213 OFFICE O/P EST LOW 20 MIN: CPT | Performed by: INTERNAL MEDICINE

## 2021-11-11 PROCEDURE — 3074F SYST BP LT 130 MM HG: CPT | Performed by: INTERNAL MEDICINE

## 2021-11-11 NOTE — PROGRESS NOTES
Malou Peralta is a 64year old female. HPI:   Patient presents with:   Follow - Up: patient states her episodes of reflux are resolved  however epi gastric pain still persists,,pain can reach an 8/10 at times     The patient is a 64year old fem • Breast Cancer Sister 39   • Breast Cancer Paternal Grandmother         66's   • Cancer Paternal Grandmother    • Ovarian Cancer Neg       Social History: Social History    Tobacco Use      Smoking status: Never Smoker      Smokeless tobacco: Never Used distress  HEENT- anicteric sclera, neck no lymphadnopathy, OP- clear with no masses or lesions  Chest- Clear bilaterally, no wheezing,  Heart- regular rate, no murmur or gallop  Abdomen- Soft and nontender, nondistended  Ext- no clubbing or cyanosis  Skin-

## 2021-11-11 NOTE — PATIENT INSTRUCTIONS
Abdominal pain   GERD  Nausea  - call to set up gallbladder scan (HIDA) [phone]  - continue on small meals  - lansoprazole for acid reflux  - call to see surgeon about gallbladder [phone]

## 2021-11-13 ENCOUNTER — PATIENT MESSAGE (OUTPATIENT)
Dept: GASTROENTEROLOGY | Facility: CLINIC | Age: 56
End: 2021-11-13

## 2021-11-13 DIAGNOSIS — K57.92 DIVERTICULITIS: ICD-10-CM

## 2021-11-13 NOTE — TELEPHONE ENCOUNTER
From: Isamar Sarkar  To: Angel March.  Radha Morris MD  Sent: 11/13/2021 7:13 AM CST  Subject: Refill     Good morning,    Since my last visit with you on Thursday, 11/11/21, I took the last 2 Metronidazole 500mg tablets that were prescribed back in August

## 2021-11-13 NOTE — TELEPHONE ENCOUNTER
Dr. Robledo Other-    Last seen for OV on 11/11/2021 for abdominal pain, GERD, nausea. Was advised to set up HIDA scan and referred to general surgery for Gb- neither appt has been scheduled.     Pt requesting refill on metronidazole which had been previously prescrib

## 2021-11-17 RX ORDER — METRONIDAZOLE 500 MG/1
500 TABLET ORAL 2 TIMES DAILY
Qty: 20 TABLET | Refills: 0 | Status: SHIPPED | OUTPATIENT
Start: 2021-11-17

## 2021-11-23 ENCOUNTER — NURSE TRIAGE (OUTPATIENT)
Dept: FAMILY MEDICINE CLINIC | Facility: CLINIC | Age: 56
End: 2021-11-23

## 2021-11-23 NOTE — TELEPHONE ENCOUNTER
Action Requested: Summary for Provider     []  Critical Lab, Recommendations Needed  [] Need Additional Advice  []   FYI    []   Need Orders  [] Need Medications Sent to Pharmacy  []  Other     SUMMARY:Pt very upset,x 3 weeks , Very anxious and stressed

## 2021-11-23 NOTE — TELEPHONE ENCOUNTER
If there are any openings for Friday and put her in for Friday, November 26, 2021. Put her in the earliest appointment available on the day.

## 2021-11-24 ENCOUNTER — OFFICE VISIT (OUTPATIENT)
Dept: FAMILY MEDICINE CLINIC | Facility: CLINIC | Age: 56
End: 2021-11-24
Payer: COMMERCIAL

## 2021-11-24 VITALS
TEMPERATURE: 98 F | HEIGHT: 65 IN | BODY MASS INDEX: 34.66 KG/M2 | HEART RATE: 97 BPM | RESPIRATION RATE: 16 BRPM | WEIGHT: 208 LBS | DIASTOLIC BLOOD PRESSURE: 70 MMHG | SYSTOLIC BLOOD PRESSURE: 105 MMHG

## 2021-11-24 DIAGNOSIS — F41.8 INSOMNIA SECONDARY TO DEPRESSION WITH ANXIETY: ICD-10-CM

## 2021-11-24 DIAGNOSIS — G43.909 MIGRAINE WITHOUT STATUS MIGRAINOSUS, NOT INTRACTABLE, UNSPECIFIED MIGRAINE TYPE: ICD-10-CM

## 2021-11-24 DIAGNOSIS — Z56.6 STRESS AT WORK: Primary | ICD-10-CM

## 2021-11-24 DIAGNOSIS — K58.2 IRRITABLE BOWEL SYNDROME WITH BOTH CONSTIPATION AND DIARRHEA: ICD-10-CM

## 2021-11-24 DIAGNOSIS — F51.05 INSOMNIA SECONDARY TO DEPRESSION WITH ANXIETY: ICD-10-CM

## 2021-11-24 DIAGNOSIS — G44.209 TENSION HEADACHE: ICD-10-CM

## 2021-11-24 DIAGNOSIS — M99.01 CERVICOTHORACIC SOMATIC DYSFUNCTION: ICD-10-CM

## 2021-11-24 DIAGNOSIS — F43.23 PERSISTENT ADJUSTMENT DISORDER WITH MIXED ANXIETY AND DEPRESSED MOOD: ICD-10-CM

## 2021-11-24 PROCEDURE — 3074F SYST BP LT 130 MM HG: CPT | Performed by: FAMILY MEDICINE

## 2021-11-24 PROCEDURE — 3008F BODY MASS INDEX DOCD: CPT | Performed by: FAMILY MEDICINE

## 2021-11-24 PROCEDURE — 99214 OFFICE O/P EST MOD 30 MIN: CPT | Performed by: FAMILY MEDICINE

## 2021-11-24 PROCEDURE — 98925 OSTEOPATH MANJ 1-2 REGIONS: CPT | Performed by: FAMILY MEDICINE

## 2021-11-24 PROCEDURE — 3078F DIAST BP <80 MM HG: CPT | Performed by: FAMILY MEDICINE

## 2021-11-24 RX ORDER — ZOLPIDEM TARTRATE 12.5 MG/1
12.5 TABLET, FILM COATED, EXTENDED RELEASE ORAL NIGHTLY PRN
Qty: 30 TABLET | Refills: 1 | Status: SHIPPED | OUTPATIENT
Start: 2021-11-24 | End: 2022-01-21

## 2021-11-24 RX ORDER — TOPIRAMATE 50 MG/1
50 TABLET, FILM COATED ORAL 2 TIMES DAILY
Qty: 60 TABLET | Refills: 0 | Status: SHIPPED | OUTPATIENT
Start: 2021-11-24 | End: 2021-11-25

## 2021-11-24 SDOH — HEALTH STABILITY - MENTAL HEALTH: OTHER PHYSICAL AND MENTAL STRAIN RELATED TO WORK: Z56.6

## 2021-11-24 NOTE — PATIENT INSTRUCTIONS
Agree with FMLA at the very least. Short or long term disability being considered as well. Psychological referral highly recommended. Continuous FMLA retrospectively to start as of Tuesday November 23, 2021.   Quadruped Arm-Reach Exercise       Do this exe palms facing the ceiling, turn your fingers inward. You can also do this exercise holding weights if directed by your healthcare provider or physical therapist.  · Take a deep breath. Breathe out and lower your elbows toward your buttocks.  Hold for up to 5 provider before starting an exercise program.  Mikhail last reviewed this educational content on 7/1/2020  © 8181-7310 [employer]. All rights reserved. This information is not intended as a substitute for professional medical care.  Always f

## 2021-11-24 NOTE — PROGRESS NOTES
Subjective:   Patient ID: Rachel Guzmán is a 64year old female. 64year old AA female under unsurmountable stress. Since May 2021 the patient has been in a hostile environment at work primarily because of her supervisor.  Supervisor is [email or web address] traMADol 50 MG Oral Tab Take 1 tablet (50 mg total) by mouth every 6 (six) hours as needed.  30 tablet 0   • lansoprazole 30 MG Oral Capsule Delayed Release Take 1 capsule (30 mg total) by mouth every morning before breakfast. 90 capsule 0   • ZOLPIDEM TART sleep medication regarding the insomnia. Zolpidem CR 12.5 mg to be taken orally nightly for sleep.     3. Persistent adjustment disorder with mixed anxiety and depressed mood  Consultation with certified psychologist and psychiatrist being highly considere

## 2021-11-25 RX ORDER — TOPIRAMATE 50 MG/1
TABLET, FILM COATED ORAL
Qty: 180 TABLET | Refills: 0 | Status: SHIPPED | OUTPATIENT
Start: 2021-11-25

## 2021-12-01 ENCOUNTER — TELEPHONE (OUTPATIENT)
Dept: FAMILY MEDICINE CLINIC | Facility: CLINIC | Age: 56
End: 2021-12-01

## 2021-12-01 ENCOUNTER — TELEPHONE (OUTPATIENT)
Dept: CASE MANAGEMENT | Age: 56
End: 2021-12-01

## 2021-12-01 NOTE — TELEPHONE ENCOUNTER
Noted. Patient typically communicates with me through [address], so I sent her a message informing her.

## 2021-12-01 NOTE — TELEPHONE ENCOUNTER
FMLA forms received in the [facility] via fax for patient. Forms emailed to [email or web address], original forms left in the [facility].

## 2021-12-01 NOTE — TELEPHONE ENCOUNTER
Patient read the [employer] message that I sent letting her know that we do not have authorization and we recommend rescheduling:  Last read by Rachel Guzmán at 2:51 PM on 12/1/2021. Once authorized, will notify the patient.

## 2021-12-01 NOTE — TELEPHONE ENCOUNTER
Drake Wagner,    I just wanted to give you an FYI:    The  [address] you ordered for [facility] is still pending with her insurance. I was unable to leave a message for her because her VM is full.     I emailed central schedulin

## 2021-12-02 ENCOUNTER — NURSE TRIAGE (OUTPATIENT)
Dept: FAMILY MEDICINE CLINIC | Facility: CLINIC | Age: 56
End: 2021-12-02

## 2021-12-02 DIAGNOSIS — J00 ACUTE INFECTIVE RHINITIS: Primary | ICD-10-CM

## 2021-12-02 RX ORDER — AZITHROMYCIN 250 MG/1
TABLET, FILM COATED ORAL
Qty: 6 TABLET | Refills: 0 | Status: SHIPPED | OUTPATIENT
Start: 2021-12-02 | End: 2021-12-07

## 2021-12-02 NOTE — TELEPHONE ENCOUNTER
Action Requested: Summary for Provider     []  Critical Lab, Recommendations Needed  [x] Need Additional Advice  [x]   FYI    []   Need Orders  [x] Need Medications Sent to Pharmacy  []  Other     SUMMARY:  Patient called to verify the receipt of FMLA antonina

## 2021-12-03 NOTE — TELEPHONE ENCOUNTER
All FMLA concerns should be directed to telephone # [phone] or they can send their forms to [email or web address]    I do not do FMLA forms in this office and I am not inclined to get patient's FMLA forms off of my work email. Please redirect this.

## 2021-12-03 NOTE — TELEPHONE ENCOUNTER
Authorization of HIDA scan still pending per [facility].     Will notify patient once approved so that she can reschedule this test.

## 2021-12-07 RX ORDER — AMLODIPINE BESYLATE 2.5 MG/1
2.5 TABLET ORAL DAILY
Qty: 90 TABLET | Refills: 1 | Status: SHIPPED | OUTPATIENT
Start: 2021-12-07

## 2021-12-07 NOTE — TELEPHONE ENCOUNTER
Pt called in regards to clarification of FCR she received, explained to pt the FCR form and current turnaround time, pt verbalized understanding. First day off of work 12/7/2021-1/16/2022 due to stress, anxiety, and depression.

## 2021-12-07 NOTE — TELEPHONE ENCOUNTER
Refilled per [facility] protocol.   Requested Prescriptions   Pending Prescriptions Disp Refills    AMLODIPINE 2.5 MG Oral Tab [Pharmacy Med Name: AMLODIPINE BESYLATE 2.5MG TABLETS] 90 tablet 1     Sig: TAKE 1 TABLET(2.5 MG) BY MOUTH DAILY        Hyper

## 2021-12-08 NOTE — TELEPHONE ENCOUNTER
Dr. Luis Alfredo Wahl,     Please sign off on form: FMLA off work 12/6/21 - 1/16/22  -Highlight the patient and hit \"Chart\" button.   -In Chart Review, w/in the Encounter tab - click 1 time on the Telephone call encounter for 12/1/21 Scroll down the telephone enco

## 2021-12-10 NOTE — TELEPHONE ENCOUNTER
Managed Care    Have we received any updates on the prior authorization of the HIDA scan? It still says pending review. I want to notify patient when approved so that she can reschedule.     Thank you

## 2021-12-13 NOTE — TELEPHONE ENCOUNTER
Drake Pleitez/Dr. Vida Coronado    I spoke with Ivana Delvalle from [facility] 12/10/21 - patient has BCBS through [address] she has only covers doctors under [facility]. [facility] doctors and facilities are out of network.     I LMOVM: advising patient of

## 2021-12-15 NOTE — TELEPHONE ENCOUNTER
The pt will have to complete workup through [facility] as we are not in insurance network. Please make sure pt is aware.

## 2021-12-15 NOTE — TELEPHONE ENCOUNTER
Dr. Jinny Jimenez spoke to [place]. I reviewed the below message. She signed up for new insurance back in September, hoping it would kick in and she could get her test now.   Her new insurance will start in January and she has the number to schedule the

## 2021-12-16 ENCOUNTER — LAB ENCOUNTER (OUTPATIENT)
Dept: LAB | Age: 56
End: 2021-12-16
Attending: FAMILY MEDICINE
Payer: COMMERCIAL

## 2021-12-16 ENCOUNTER — OFFICE VISIT (OUTPATIENT)
Dept: FAMILY MEDICINE CLINIC | Facility: CLINIC | Age: 56
End: 2021-12-16
Payer: COMMERCIAL

## 2021-12-16 VITALS
BODY MASS INDEX: 33.99 KG/M2 | SYSTOLIC BLOOD PRESSURE: 106 MMHG | RESPIRATION RATE: 17 BRPM | HEART RATE: 97 BPM | DIASTOLIC BLOOD PRESSURE: 83 MMHG | WEIGHT: 204 LBS | HEIGHT: 65 IN

## 2021-12-16 DIAGNOSIS — K62.5 BRBPR (BRIGHT RED BLOOD PER RECTUM): Primary | ICD-10-CM

## 2021-12-16 DIAGNOSIS — E66.9 OBESITY (BMI 30.0-34.9): ICD-10-CM

## 2021-12-16 DIAGNOSIS — K62.5 BRBPR (BRIGHT RED BLOOD PER RECTUM): ICD-10-CM

## 2021-12-16 DIAGNOSIS — R11.0 NAUSEA: ICD-10-CM

## 2021-12-16 DIAGNOSIS — R10.13 EPIGASTRIC PAIN: ICD-10-CM

## 2021-12-16 PROCEDURE — 80053 COMPREHEN METABOLIC PANEL: CPT

## 2021-12-16 PROCEDURE — 3079F DIAST BP 80-89 MM HG: CPT | Performed by: FAMILY MEDICINE

## 2021-12-16 PROCEDURE — 36415 COLL VENOUS BLD VENIPUNCTURE: CPT

## 2021-12-16 PROCEDURE — 99214 OFFICE O/P EST MOD 30 MIN: CPT | Performed by: FAMILY MEDICINE

## 2021-12-16 PROCEDURE — 3008F BODY MASS INDEX DOCD: CPT | Performed by: FAMILY MEDICINE

## 2021-12-16 PROCEDURE — 3074F SYST BP LT 130 MM HG: CPT | Performed by: FAMILY MEDICINE

## 2021-12-16 PROCEDURE — 81001 URINALYSIS AUTO W/SCOPE: CPT

## 2021-12-16 PROCEDURE — 85025 COMPLETE CBC W/AUTO DIFF WBC: CPT

## 2021-12-16 PROCEDURE — 80061 LIPID PANEL: CPT

## 2021-12-16 PROCEDURE — 84443 ASSAY THYROID STIM HORMONE: CPT

## 2021-12-16 RX ORDER — PANTOPRAZOLE SODIUM 40 MG/1
40 TABLET, DELAYED RELEASE ORAL
Qty: 30 TABLET | Refills: 0 | Status: SHIPPED | OUTPATIENT
Start: 2021-12-16 | End: 2022-01-24 | Stop reason: ALTCHOICE

## 2021-12-16 NOTE — PROGRESS NOTES
Subjective:   Patient ID: Destiny Jones is a 64year old female.     This patient is a 14-year-old -American female who presents to the clinic today with a history of recurrent epigastric discomfort and GI disorders and treatment who on [facility] capsule 0   • HYDROCHLOROTHIAZIDE 12.5 MG Oral Tab TAKE 1 TABLET BY MOUTH DAILY 90 tablet 3   • CETIRIZINE 10 MG Oral Tab TAKE 1 TABLET BY MOUTH DAILY 90 tablet 3   • Dicyclomine HCl 10 MG Oral Cap Take 1 capsule (10 mg total) by mouth 3 (three) times radha TSH W REFLEX TO FREE T4; Future  - URINALYSIS, ROUTINE; Future  - LIPID PANEL; Future    4. Epigastric pain  Prescribed. - pantoprazole (PROTONIX) 40 MG Oral Tab EC;  Take 1 tablet (40 mg total) by mouth every morning before breakfast.  Dispense: 30 tablet

## 2021-12-16 NOTE — PATIENT INSTRUCTIONS
May need follow up with GI. CBC and H. pylori recommended on today. Recommend weight loss via daily exercising and consistent healthy dietary changes. Encouraged physical fitness and daily physical activity daily.   Medication reviewed and renewed where

## 2021-12-17 ENCOUNTER — LAB ENCOUNTER (OUTPATIENT)
Dept: LAB | Age: 56
End: 2021-12-17
Attending: FAMILY MEDICINE
Payer: COMMERCIAL

## 2021-12-17 DIAGNOSIS — R11.0 NAUSEA: ICD-10-CM

## 2021-12-17 DIAGNOSIS — K62.5 BRBPR (BRIGHT RED BLOOD PER RECTUM): ICD-10-CM

## 2021-12-17 PROCEDURE — 87338 HPYLORI STOOL AG IA: CPT

## 2021-12-29 ENCOUNTER — PATIENT MESSAGE (OUTPATIENT)
Dept: FAMILY MEDICINE CLINIC | Facility: CLINIC | Age: 56
End: 2021-12-29

## 2021-12-29 NOTE — TELEPHONE ENCOUNTER
Patient calling, confirmed name and . Needs OV notes from  &  faxed to Roxane at [facility] at [address]: [phone]    Requesting claim # [id number] be included. Upon chart review the OV dates were  & .  Notes faxe

## 2021-12-30 ENCOUNTER — TELEPHONE (OUTPATIENT)
Dept: FAMILY MEDICINE CLINIC | Facility: CLINIC | Age: 56
End: 2021-12-30

## 2021-12-30 NOTE — TELEPHONE ENCOUNTER
Pt states she received a call around 1130am, from our number and did not leave a message. Chart reviewed with Pt, no documentation noted. No recent labs noted. Pt had an appointment with Dr. Prince Fountain today. Pt states she is not going to worry about it.

## 2021-12-30 NOTE — PATIENT INSTRUCTIONS
Status unchanged: Totally disabled and unable to work life or remotely. Medical records are complete and ready to be sent to any and all pertinent parties. Psychological consultation still highly advised and recommended.   Return to work cannot be determi

## 2021-12-30 NOTE — PROGRESS NOTES
Subjective:   Patient ID: Catalina Orr is a 64year old female.     The following is the initial intake regarding this patient's ongoing symptoms and conditions as recorded on the visit for 11/24/2021:    64year old AA female under unsurmountable supervision although she is off for stress leave still getting notifications that are adding to her stress. The patient states that she was informed by her direct supervision that necessary documentation had not been received.  If that be the case this is a MOUTH DAILY 90 tablet 3   • Dicyclomine HCl 10 MG Oral Cap Take 1 capsule (10 mg total) by mouth 3 (three) times daily as needed. 30 capsule 1   • triamcinolone acetonide 0.1 % External Cream Apply topically 2 (two) times daily.  80 g 1   • Multiple Vitamin requested or ordered in this encounter       Imaging & Referrals:  None   Patient Instructions   Status unchanged: Totally disabled and unable to work life or remotely. Medical records are complete and ready to be sent to any and all pertinent parties.   P

## 2022-01-11 ENCOUNTER — TELEPHONE (OUTPATIENT)
Dept: FAMILY MEDICINE CLINIC | Facility: CLINIC | Age: 57
End: 2022-01-11

## 2022-01-11 NOTE — TELEPHONE ENCOUNTER
Received call from [employer] . HIPPA release form verified under media tab. She questions whether patient was referred to psychiatrist for evaluation of her mental anguish.   She also questions if patient would be able to perform t

## 2022-01-13 ENCOUNTER — PATIENT MESSAGE (OUTPATIENT)
Dept: FAMILY MEDICINE CLINIC | Facility: CLINIC | Age: 57
End: 2022-01-13

## 2022-01-14 NOTE — TELEPHONE ENCOUNTER
From: Blanca Garrett  To:  Jessica Blanca DO  Sent: 1/13/2022 10:02 AM CST  Subject: Good morning,    Following up on the my chart message and phone call I made while you were on vacation regarding the second denial notice I received from [facility]

## 2022-01-20 NOTE — TELEPHONE ENCOUNTER
Nursing: We may want to contact the patient to clarify which PPI she is on.   It appears per Dr. Jhony Corrigan last notes that the patient was taking lansoprazole however I see that she was also prescribed pantoprazole by her PCP last month

## 2022-01-21 ENCOUNTER — HOSPITAL ENCOUNTER (OUTPATIENT)
Dept: MAMMOGRAPHY | Facility: HOSPITAL | Age: 57
Discharge: HOME | End: 2022-01-21
Attending: PHYSICIAN ASSISTANT
Payer: COMMERCIAL

## 2022-01-21 DIAGNOSIS — Z12.31 ENCOUNTER FOR MAMMOGRAM TO ESTABLISH BASELINE MAMMOGRAM: ICD-10-CM

## 2022-01-21 PROCEDURE — 77063 BREAST TOMOSYNTHESIS BI: CPT | Performed by: PHYSICIAN ASSISTANT

## 2022-01-21 PROCEDURE — 77067 SCR MAMMO BI INCL CAD: CPT | Performed by: PHYSICIAN ASSISTANT

## 2022-01-21 NOTE — PROGRESS NOTES
Subjective:   Patient ID: Catracho Zapata is a 64year old female. Please evaluate this 59-year-old -American female who is trying her best to endure undue pressure and stress created by her direct supervision in her job environment.   She needed.  30 tablet 0   • lansoprazole 30 MG Oral Capsule Delayed Release Take 1 capsule (30 mg total) by mouth every morning before breakfast. 90 capsule 0   • HYDROCHLOROTHIAZIDE 12.5 MG Oral Tab TAKE 1 TABLET BY MOUTH DAILY 90 tablet 3   • CETIRIZINE 10 M PSYCHOLOGY - INTERNAL    No orders of the defined types were placed in this encounter. Meds This Visit:  Requested Prescriptions     Signed Prescriptions Disp Refills   • Zolpidem Tartrate ER 12.5 MG Oral Tab CR 30 tablet 1     Sig: Take 1 tablet (12.

## 2022-01-21 NOTE — PATIENT INSTRUCTIONS
Status remains totally disabled secondary to emotional/mental condition. Continue with medication as prescribed. Continue to try to establish an appointment with mental health specialist.  Continue with blood pressure medications.   Status note faxed to clari

## 2022-01-22 NOTE — TELEPHONE ENCOUNTER
All of this has been addressed on the patient's appointment on today which is Friday, January 21, 2022.

## 2022-01-24 ENCOUNTER — PATIENT MESSAGE (OUTPATIENT)
Dept: GASTROENTEROLOGY | Facility: CLINIC | Age: 57
End: 2022-01-24

## 2022-01-24 ENCOUNTER — PATIENT MESSAGE (OUTPATIENT)
Dept: FAMILY MEDICINE CLINIC | Facility: CLINIC | Age: 57
End: 2022-01-24

## 2022-01-24 RX ORDER — LANSOPRAZOLE 30 MG/1
30 CAPSULE, DELAYED RELEASE ORAL
Qty: 90 CAPSULE | Refills: 1 | Status: SHIPPED | OUTPATIENT
Start: 2022-01-24

## 2022-01-24 RX ORDER — TRAZODONE HYDROCHLORIDE 100 MG/1
100 TABLET ORAL NIGHTLY
Qty: 90 TABLET | Refills: 1 | Status: SHIPPED | OUTPATIENT
Start: 2022-01-24

## 2022-01-24 NOTE — TELEPHONE ENCOUNTER
From: Carter Sarkar  To: Cassandra Hubbard. Chris Mckeon MD  Sent: 1/24/2022 10:18 AM CST  Subject: Refill    Good morning,    My insurance will not cover the Pantoprazole, Dr. Chris Mckeon prescribed Lansoprazole because it works [employer].

## 2022-01-24 NOTE — TELEPHONE ENCOUNTER
Shin Solomon    Patient requesting refill of the lansoprazole. She is not taking pantoprazole because her insurance will not cover it.     LR 10/11/2021   LOV 11/11/2021    Thank you    lansoprazole 30 MG Oral Capsule Delayed Release 90 capsule 0 10/11/2021    S

## 2022-01-24 NOTE — TELEPHONE ENCOUNTER
See [employer] message dated 1/24/2022 routed back to [facility]. Patient is not taking the pantoprazole because her insurance will not cover it, she is only taking the lansoprazole.

## 2022-01-25 NOTE — TELEPHONE ENCOUNTER
With POP UP HIGH WARNING       Refill passed per [facility] protocol.      Requested Prescriptions   Pending Prescriptions Disp Refills    TRAZODONE 100 MG Oral Tab [Pharmacy Med Name: TRAZODONE 100MG TABLETS] 30 tablet 3     Sig: TAKE 1 TABLET(100 M

## 2022-02-01 RX ORDER — LANSOPRAZOLE 30 MG/1
CAPSULE, DELAYED RELEASE ORAL
Qty: 90 CAPSULE | Refills: 0 | OUTPATIENT
Start: 2022-02-01

## 2022-02-02 RX ORDER — LANSOPRAZOLE 30 MG/1
30 CAPSULE, DELAYED RELEASE ORAL
Qty: 90 CAPSULE | Refills: 0 | OUTPATIENT
Start: 2022-02-02

## 2022-02-03 ENCOUNTER — TELEPHONE (OUTPATIENT)
Dept: GASTROENTEROLOGY | Facility: CLINIC | Age: 57
End: 2022-02-03

## 2022-02-03 ENCOUNTER — TELEPHONE (OUTPATIENT)
Dept: FAMILY MEDICINE CLINIC | Facility: CLINIC | Age: 57
End: 2022-02-03

## 2022-02-03 NOTE — TELEPHONE ENCOUNTER
I called [facility] after sitting on hold for over 15 minutes they told me that they received the refill request, but they didn't fill it because it isn't covered by the patient's insurance. I called the patient and let her know that if she is having symptoms and wants it now she can fill it using a goodrx. com coupon which would make a 90 day supply about $26 instead of over $100 and I let her know that I can work on a prior authorization for future refills as this can take several days. I called the number on the back of the patient's insurance card. After going through the menus and holding the call with [employer] dropped. I tried calling twice and we are not allowed to use covermymeds. com at this time. See telephone encounter dated 2/3/2022 that was routed to [facility] for assistance.

## 2022-02-03 NOTE — TELEPHONE ENCOUNTER
states that someone called her and she missed the call ,no voice mail, informed that there is no current  note/encounter from Dr Noah Stewart office  but there is an encounter from GI but not sure if they are the one who  called her few minutes ago   , call was transferred  per patient request.

## 2022-02-09 NOTE — TELEPHONE ENCOUNTER
Dr. Megan Cr    Prior authorization form placed on your desk to review and sign if appropriate.   I can then fax the prior authorization request.    Thank you

## 2022-02-10 ENCOUNTER — PATIENT MESSAGE (OUTPATIENT)
Dept: FAMILY MEDICINE CLINIC | Facility: CLINIC | Age: 57
End: 2022-02-10

## 2022-02-11 ENCOUNTER — OFFICE VISIT (OUTPATIENT)
Dept: FAMILY MEDICINE CLINIC | Facility: CLINIC | Age: 57
End: 2022-02-11
Payer: COMMERCIAL

## 2022-02-11 ENCOUNTER — TELEPHONE (OUTPATIENT)
Dept: FAMILY MEDICINE CLINIC | Facility: CLINIC | Age: 57
End: 2022-02-11

## 2022-02-11 VITALS
SYSTOLIC BLOOD PRESSURE: 116 MMHG | DIASTOLIC BLOOD PRESSURE: 71 MMHG | HEIGHT: 65 IN | TEMPERATURE: 98 F | BODY MASS INDEX: 32.82 KG/M2 | HEART RATE: 76 BPM | WEIGHT: 197 LBS

## 2022-02-11 DIAGNOSIS — J30.9 ALLERGIC RHINITIS, UNSPECIFIED SEASONALITY, UNSPECIFIED TRIGGER: Primary | ICD-10-CM

## 2022-02-11 DIAGNOSIS — F43.23 PERSISTENT ADJUSTMENT DISORDER WITH MIXED ANXIETY AND DEPRESSED MOOD: ICD-10-CM

## 2022-02-11 DIAGNOSIS — K57.92 DIVERTICULITIS: ICD-10-CM

## 2022-02-11 DIAGNOSIS — F51.04 PSYCHOPHYSIOLOGICAL INSOMNIA: ICD-10-CM

## 2022-02-11 DIAGNOSIS — F51.05 INSOMNIA SECONDARY TO DEPRESSION WITH ANXIETY: ICD-10-CM

## 2022-02-11 DIAGNOSIS — L20.9 ATOPIC DERMATITIS, UNSPECIFIED TYPE: ICD-10-CM

## 2022-02-11 DIAGNOSIS — K58.2 IRRITABLE BOWEL SYNDROME WITH BOTH CONSTIPATION AND DIARRHEA: ICD-10-CM

## 2022-02-11 DIAGNOSIS — G43.001 MIGRAINE WITHOUT AURA AND WITH STATUS MIGRAINOSUS, NOT INTRACTABLE: ICD-10-CM

## 2022-02-11 DIAGNOSIS — F41.8 INSOMNIA SECONDARY TO DEPRESSION WITH ANXIETY: ICD-10-CM

## 2022-02-11 PROCEDURE — 3008F BODY MASS INDEX DOCD: CPT | Performed by: FAMILY MEDICINE

## 2022-02-11 PROCEDURE — 3078F DIAST BP <80 MM HG: CPT | Performed by: FAMILY MEDICINE

## 2022-02-11 PROCEDURE — 3074F SYST BP LT 130 MM HG: CPT | Performed by: FAMILY MEDICINE

## 2022-02-11 PROCEDURE — 99214 OFFICE O/P EST MOD 30 MIN: CPT | Performed by: FAMILY MEDICINE

## 2022-02-11 RX ORDER — METRONIDAZOLE 500 MG/1
500 TABLET ORAL 2 TIMES DAILY
Qty: 20 TABLET | Refills: 0 | Status: SHIPPED | OUTPATIENT
Start: 2022-02-11

## 2022-02-11 RX ORDER — TRAMADOL HYDROCHLORIDE 50 MG/1
50 TABLET ORAL EVERY 6 HOURS PRN
Qty: 30 TABLET | Refills: 0 | Status: SHIPPED | OUTPATIENT
Start: 2022-02-11

## 2022-02-11 RX ORDER — CETIRIZINE HYDROCHLORIDE 10 MG/1
10 TABLET ORAL DAILY
Qty: 90 TABLET | Refills: 3 | Status: SHIPPED | OUTPATIENT
Start: 2022-02-11

## 2022-02-11 NOTE — TELEPHONE ENCOUNTER
Updated letter faxed to Ty Alonso at [phone]. Shane Johnson on cover letter to contact medical records ([phone]) for copy of office visit notes. Patient aware. Awaiting return call from forms department regarding FMLA.    Will also forward encounter for FMLA paperwork clarification

## 2022-02-11 NOTE — TELEPHONE ENCOUNTER
Dr. David Luke - Tramadol needs to be sent to correct pharmacy: [facility] [id number]   Please advise. Thank you! Pt currently at [facility]. RN advised she may have to return to pharmacy, cannnot guarantee turnaround time. She verbalizes understanding and is agreeable to instructions. The other non-controlled substances were pulled over to correct pharmacy per patient. Patient's date of birth and full name both confirmed.          RN removed [facility] in [place] from record per pt request.

## 2022-02-11 NOTE — TELEPHONE ENCOUNTER
Dr. Debbie Corey - Please edit Letter for work and office visit note. Onsite Staff - Please fax revised Letter and OV note to   Alberto Nava of [address]  fax# [phone]  Thank you! Dr. Debbie Corey - requested revisions:    Letter  Okay to delete: \"Patient will be returning to work 2/14/2022 unadvisedly, and only under the threat of losing her job\"  Alberto Nava advised her that this letter does not need to say this. Should indicate that leave should be continuous not returning to work on 2/14/22 and will remain off due to doctor's order. Jazmyne Adams also needs OV notes From Dr. Debbie Corey. OV Note:   Please add that she will see psychiatrist 2/18/22. Patient called office. RN spoke with patient. Patient's date of birth and full name both confirmed. She received call from Alberto Nava advising on changes to letter and OV note.

## 2022-02-11 NOTE — TELEPHONE ENCOUNTER
I faxed prior authorization request form to:    [place] [address] Review Department  Fax # [phone]  Phone # [phone]. Await determination.

## 2022-02-11 NOTE — TELEPHONE ENCOUNTER
RN called Forms department to get clarification on FMLA forms. Called three times. No answer. Left message to call back. Transfer to triage. RN spoke to Dr. Muriel Moser regarding request below. Per Dr. Muriel Moser, okay to adjust letter as recommended below. Dr. Muriel Moser also recommends that [facility] MARTINEZ from [facility] go through medical records for office visit notes due to the sensitive nature of patient's symptoms. Dr. Muriel Moser:   Please see pended letter and sign   RN will fax letter to [facility] from [facility] once signed by Dr. Muriel Moser.

## 2022-02-15 NOTE — TELEPHONE ENCOUNTER
Clinical staff at OP: please review. Patient upset that the 2nd letter still states she will be returning to work 2/14/22. Also [facility] didn't get the letter. Can someone call her and update letter.

## 2022-02-16 ENCOUNTER — TELEPHONE (OUTPATIENT)
Dept: GASTROENTEROLOGY | Facility: CLINIC | Age: 57
End: 2022-02-16

## 2022-02-16 NOTE — TELEPHONE ENCOUNTER
Per pharm benefits verification entry, she has [employer]. Can try calling their help desk at  [phone].

## 2022-02-16 NOTE — TELEPHONE ENCOUNTER
Dr. Peter Lank    Patient to have 1 year recall US gallbladder to monitor polyp. There is an order, but it expires in a week and US books out several weeks in advance. Please place new order for US Gallbladder if plan still appropriate.     Thank you

## 2022-02-16 NOTE — TELEPHONE ENCOUNTER
Patient contacted. Aware she is due for a repeat ultrasound. I offered to give her the number to central scheduling, but she told me that she already had it.

## 2022-02-16 NOTE — TELEPHONE ENCOUNTER
Fax received from [employer] \"This patient's pharmacy coverage has . Please contact the insurance plan at the number on the back of the patient's most current insurance card. PPD- Do you know where I can send the PA for lansoprazole? I tried to call the number on the back of the card during the initial request and it kept disconnecting.

## 2022-02-17 NOTE — TELEPHONE ENCOUNTER
I spoke to [address] with [employer]. She told me that no PA is needed and if she fills it at a retail pharmacy it should be $16.20. I sent patient a Tail message to update her on this.

## 2022-02-25 ENCOUNTER — PATIENT MESSAGE (OUTPATIENT)
Dept: FAMILY MEDICINE CLINIC | Facility: CLINIC | Age: 57
End: 2022-02-25

## 2022-03-11 ENCOUNTER — OFFICE VISIT (OUTPATIENT)
Dept: FAMILY MEDICINE CLINIC | Facility: CLINIC | Age: 57
End: 2022-03-11
Payer: COMMERCIAL

## 2022-03-11 VITALS
TEMPERATURE: 98 F | WEIGHT: 200 LBS | SYSTOLIC BLOOD PRESSURE: 115 MMHG | BODY MASS INDEX: 33.32 KG/M2 | HEIGHT: 65 IN | DIASTOLIC BLOOD PRESSURE: 71 MMHG | HEART RATE: 84 BPM

## 2022-03-11 DIAGNOSIS — F51.05 INSOMNIA SECONDARY TO DEPRESSION WITH ANXIETY: ICD-10-CM

## 2022-03-11 DIAGNOSIS — F51.02 ADJUSTMENT INSOMNIA: ICD-10-CM

## 2022-03-11 DIAGNOSIS — G44.209 TENSION HEADACHE: ICD-10-CM

## 2022-03-11 DIAGNOSIS — Z56.6 STRESS AT WORK: Primary | ICD-10-CM

## 2022-03-11 DIAGNOSIS — F43.23 PERSISTENT ADJUSTMENT DISORDER WITH MIXED ANXIETY AND DEPRESSED MOOD: ICD-10-CM

## 2022-03-11 DIAGNOSIS — F51.04 PSYCHOPHYSIOLOGICAL INSOMNIA: ICD-10-CM

## 2022-03-11 DIAGNOSIS — F41.8 INSOMNIA SECONDARY TO DEPRESSION WITH ANXIETY: ICD-10-CM

## 2022-03-11 DIAGNOSIS — L65.9 ALOPECIA: ICD-10-CM

## 2022-03-11 PROCEDURE — 3008F BODY MASS INDEX DOCD: CPT | Performed by: FAMILY MEDICINE

## 2022-03-11 PROCEDURE — 3074F SYST BP LT 130 MM HG: CPT | Performed by: FAMILY MEDICINE

## 2022-03-11 PROCEDURE — 99214 OFFICE O/P EST MOD 30 MIN: CPT | Performed by: FAMILY MEDICINE

## 2022-03-11 PROCEDURE — 3078F DIAST BP <80 MM HG: CPT | Performed by: FAMILY MEDICINE

## 2022-03-11 SDOH — HEALTH STABILITY - MENTAL HEALTH: OTHER PHYSICAL AND MENTAL STRAIN RELATED TO WORK: Z56.6

## 2022-03-11 NOTE — PATIENT INSTRUCTIONS
Patient has been encouraged to continue with her consultation via her psychiatrist.  Zolpidem to be taken as needed. If the patient had the resources it may be well worth it to attend besides and/your yoga classes, however with her company refusing to pay her this becomes a challenge as well.

## 2022-03-13 ENCOUNTER — HOSPITAL ENCOUNTER (OUTPATIENT)
Dept: ULTRASOUND IMAGING | Age: 57
Discharge: HOME | End: 2022-03-13
Attending: INTERNAL MEDICINE
Payer: COMMERCIAL

## 2022-03-13 DIAGNOSIS — K82.4 GALLBLADDER POLYP: ICD-10-CM

## 2022-03-13 DIAGNOSIS — R10.10 PAIN OF UPPER ABDOMEN: ICD-10-CM

## 2022-03-13 PROCEDURE — 76705 ECHO EXAM OF ABDOMEN: CPT | Performed by: INTERNAL MEDICINE

## 2022-03-15 ENCOUNTER — PATIENT MESSAGE (OUTPATIENT)
Dept: GASTROENTEROLOGY | Facility: CLINIC | Age: 57
End: 2022-03-15

## 2022-03-15 RX ORDER — DICYCLOMINE HYDROCHLORIDE 10 MG/1
10 CAPSULE ORAL 3 TIMES DAILY PRN
Qty: 30 CAPSULE | Refills: 1 | Status: SHIPPED | OUTPATIENT
Start: 2022-03-15

## 2022-03-15 NOTE — TELEPHONE ENCOUNTER
[facility] request for Dicyclomine received from patient's pharmacy. Orders pended below, please review and sign if appropriate. Thank you.      LOV: 11/11/21    LR: 11/16/20, 1 refill

## 2022-03-16 NOTE — TELEPHONE ENCOUNTER
Dr. Aylin Schumacher    Please advise on below message. Are you concerned that portion of pancreas body and tail images were obscured by gas?       Thank you leg pain

## 2022-04-02 RX ORDER — TOPIRAMATE 50 MG/1
50 TABLET, FILM COATED ORAL 2 TIMES DAILY
Qty: 180 TABLET | Refills: 1 | Status: SHIPPED | OUTPATIENT
Start: 2022-04-02

## 2022-04-02 NOTE — TELEPHONE ENCOUNTER
Refill passed per [facility] protocol   Requested Prescriptions   Pending Prescriptions Disp Refills    TOPIRAMATE 50 MG Oral Tab [Pharmacy Med Name: TOPIRAMATE 50MG TABLETS] 180 tablet 0     Sig: TAKE 1 TABLET(50 MG) BY MOUTH TWICE DAILY        Neurology Medications Passed - 4/2/2022 10:15 AM        Passed - Appointment in the past 6 or next 3 months

## 2022-04-29 ENCOUNTER — OFFICE VISIT (OUTPATIENT)
Dept: FAMILY MEDICINE CLINIC | Facility: CLINIC | Age: 57
End: 2022-04-29
Payer: COMMERCIAL

## 2022-04-29 VITALS
OXYGEN SATURATION: 99 % | SYSTOLIC BLOOD PRESSURE: 107 MMHG | HEIGHT: 65 IN | WEIGHT: 200 LBS | HEART RATE: 84 BPM | BODY MASS INDEX: 33.32 KG/M2 | DIASTOLIC BLOOD PRESSURE: 73 MMHG

## 2022-04-29 DIAGNOSIS — F41.8 INSOMNIA SECONDARY TO DEPRESSION WITH ANXIETY: ICD-10-CM

## 2022-04-29 DIAGNOSIS — F51.04 PSYCHOPHYSIOLOGICAL INSOMNIA: ICD-10-CM

## 2022-04-29 DIAGNOSIS — G43.909 MIGRAINE WITHOUT STATUS MIGRAINOSUS, NOT INTRACTABLE, UNSPECIFIED MIGRAINE TYPE: ICD-10-CM

## 2022-04-29 DIAGNOSIS — F51.05 INSOMNIA SECONDARY TO DEPRESSION WITH ANXIETY: ICD-10-CM

## 2022-04-29 DIAGNOSIS — Z56.6 STRESS AT WORK: Primary | ICD-10-CM

## 2022-04-29 DIAGNOSIS — G44.209 TENSION HEADACHE: ICD-10-CM

## 2022-04-29 DIAGNOSIS — K57.92 DIVERTICULITIS: ICD-10-CM

## 2022-04-29 PROCEDURE — 3074F SYST BP LT 130 MM HG: CPT | Performed by: FAMILY MEDICINE

## 2022-04-29 PROCEDURE — 3008F BODY MASS INDEX DOCD: CPT | Performed by: FAMILY MEDICINE

## 2022-04-29 PROCEDURE — 99214 OFFICE O/P EST MOD 30 MIN: CPT | Performed by: FAMILY MEDICINE

## 2022-04-29 PROCEDURE — 3078F DIAST BP <80 MM HG: CPT | Performed by: FAMILY MEDICINE

## 2022-04-29 RX ORDER — METRONIDAZOLE 500 MG/1
500 TABLET ORAL 2 TIMES DAILY
Qty: 20 TABLET | Refills: 0 | Status: SHIPPED | OUTPATIENT
Start: 2022-04-29

## 2022-04-29 SDOH — HEALTH STABILITY - MENTAL HEALTH: OTHER PHYSICAL AND MENTAL STRAIN RELATED TO WORK: Z56.6

## 2022-04-29 NOTE — PATIENT INSTRUCTIONS
There is a continued need appropriate prescription medications. Keep psychotherapy sessions. Continue with FMLA as is.

## 2022-05-10 RX ORDER — ZOLPIDEM TARTRATE 12.5 MG/1
TABLET, FILM COATED, EXTENDED RELEASE ORAL
Qty: 30 TABLET | Refills: 0 | Status: SHIPPED | OUTPATIENT
Start: 2022-05-10

## 2022-05-13 ENCOUNTER — LAB ENCOUNTER (OUTPATIENT)
Dept: LAB | Facility: HOSPITAL | Age: 57
End: 2022-05-13
Attending: NURSE PRACTITIONER
Payer: COMMERCIAL

## 2022-05-13 ENCOUNTER — OFFICE VISIT (OUTPATIENT)
Dept: OBGYN CLINIC | Facility: CLINIC | Age: 57
End: 2022-05-13
Payer: COMMERCIAL

## 2022-05-13 VITALS
DIASTOLIC BLOOD PRESSURE: 68 MMHG | HEART RATE: 76 BPM | SYSTOLIC BLOOD PRESSURE: 110 MMHG | BODY MASS INDEX: 33 KG/M2 | WEIGHT: 199.63 LBS

## 2022-05-13 DIAGNOSIS — Z11.3 ROUTINE SCREENING FOR STI (SEXUALLY TRANSMITTED INFECTION): ICD-10-CM

## 2022-05-13 DIAGNOSIS — R10.13 EPIGASTRIC PAIN: ICD-10-CM

## 2022-05-13 DIAGNOSIS — Z90.711 S/P ABDOMINAL SUPRACERVICAL SUBTOTAL HYSTERECTOMY: ICD-10-CM

## 2022-05-13 DIAGNOSIS — Z01.419 ENCOUNTER FOR ANNUAL ROUTINE GYNECOLOGICAL EXAMINATION: Primary | ICD-10-CM

## 2022-05-13 DIAGNOSIS — R10.2 PELVIC PAIN: ICD-10-CM

## 2022-05-13 LAB
HBV SURFACE AG SER-ACNC: <0.1 [IU]/L
HBV SURFACE AG SERPL QL IA: NONREACTIVE
HCV AB SERPL QL IA: NONREACTIVE

## 2022-05-13 PROCEDURE — 86803 HEPATITIS C AB TEST: CPT

## 2022-05-13 PROCEDURE — 3074F SYST BP LT 130 MM HG: CPT | Performed by: NURSE PRACTITIONER

## 2022-05-13 PROCEDURE — 87340 HEPATITIS B SURFACE AG IA: CPT

## 2022-05-13 PROCEDURE — 36415 COLL VENOUS BLD VENIPUNCTURE: CPT

## 2022-05-13 PROCEDURE — 99396 PREV VISIT EST AGE 40-64: CPT | Performed by: NURSE PRACTITIONER

## 2022-05-13 PROCEDURE — 3078F DIAST BP <80 MM HG: CPT | Performed by: NURSE PRACTITIONER

## 2022-05-13 PROCEDURE — 86780 TREPONEMA PALLIDUM: CPT

## 2022-05-13 PROCEDURE — 87389 HIV-1 AG W/HIV-1&-2 AB AG IA: CPT

## 2022-05-13 RX ORDER — FLUOXETINE HYDROCHLORIDE 20 MG/1
20 CAPSULE ORAL EVERY MORNING
COMMUNITY
Start: 2022-04-30

## 2022-05-13 RX ORDER — FAMOTIDINE 20 MG/1
TABLET, FILM COATED ORAL
COMMUNITY
Start: 2022-04-15 | End: 2022-05-13

## 2022-05-15 LAB
GENITAL VAGINOSIS SCREEN: NEGATIVE
TRICHOMONAS SCREEN: NEGATIVE

## 2022-05-16 LAB
C TRACH DNA SPEC QL NAA+PROBE: NEGATIVE
N GONORRHOEA DNA SPEC QL NAA+PROBE: NEGATIVE
T PALLIDUM AB SER QL: NEGATIVE
T VAGINALIS RRNA SPEC QL NAA+PROBE: NEGATIVE

## 2022-05-23 ENCOUNTER — TELEPHONE (OUTPATIENT)
Dept: GASTROENTEROLOGY | Facility: CLINIC | Age: 57
End: 2022-05-23

## 2022-05-23 NOTE — TELEPHONE ENCOUNTER
1st reminder letter mailed to patient regarding her overdue imaging order :     Veronica Cooney. (JAL=[id number])

## 2022-05-25 ENCOUNTER — TELEPHONE (OUTPATIENT)
Dept: GASTROENTEROLOGY | Facility: CLINIC | Age: 57
End: 2022-05-25

## 2022-05-25 DIAGNOSIS — R10.12 LUQ PAIN: Primary | ICD-10-CM

## 2022-05-25 NOTE — TELEPHONE ENCOUNTER
Pt states she is having pain in left rib area. Pt thinks she may need a ultrasound of spleen.   Please follow up

## 2022-05-25 NOTE — TELEPHONE ENCOUNTER
Symptoms reviewed, possible gas pocketing in colon in LUQ, make sure she moves her bowel on regular basis and may need to take Miralax PRN. Order for ultrasound spleen placed.

## 2022-05-25 NOTE — TELEPHONE ENCOUNTER
Dr. Haile Beat    Patient told me that she started having pain under left rib last Saturday. Describes pain as a pressure and it comes and goes. Eating makes it worse-she feels bloated and gets easily full. Does not matter what she eats even if just a smoothie or [employer] bar. When she has a bowel movement, the pain gets better. Denies any melena or change in bowel habits. Sometimes pain radiates to right shoulder. I advised she contact PCP for input as well as concern for cardiac issue with chest pain that radiates to shoulder. Patient reports pain is not constant and it is the very last rib and does not feel that this is chest pain/like she is having a heart attack. Aware if she were to develop a constant pressure in the chest that radiates to her shoulder she would have to go to ED. Her daughter works with ultrasounds and patient is asking for an order for an ultrasound of her spleen. She has US pelvis scheduled on 6/11/22 and would like to get this ordered so they could do it at the same time. Told me if she needs to follow up in office she prefers to be seen on a Friday. Aware if severe symptoms to present to ED.     Please advise    Thank you

## 2022-05-25 NOTE — TELEPHONE ENCOUNTER
Patient contacted. I reviewed below complete message from Dr. Winston Barrow and she voiced understanding. She has the number to central scheduling and will call to have the US spleen done with US pelvis.

## 2022-05-26 NOTE — TELEPHONE ENCOUNTER
Patient is scheduled for both ultrasounds. Your Appointments           Saturday June 18, 2022  1:00 PM  US PELVIS EXPECTED VISUALIZATION with [address] ([address] [facility] [address] [address] [phone]   Please arrive 15 minutes prior to the scheduled appointment time. Drink 32 ounces of clear liquid, preferably water. Begin drinking approximately 90 minutes prior to your appointment time. Please finish all 32 ounces 60 minutes before your appointment time. DO NOT VOID/ELIMINATE THE WATER. Patients having their menstrual cycle may have this exam; if you are wearing a tampon you will need to remove it just before the exam.     Saturday June 18, 2022  2:15 PM  ULTRASOUND SPLEEN EXAM with [address] ([address]) [address] [address] [phone]   Please arrive 15 minutes prior to your scheduled appointment time.     There are no eating or activity restrictions for this exam.

## 2022-06-02 ENCOUNTER — NURSE TRIAGE (OUTPATIENT)
Dept: FAMILY MEDICINE CLINIC | Facility: CLINIC | Age: 57
End: 2022-06-02

## 2022-06-03 NOTE — TELEPHONE ENCOUNTER
Dr. Ondina Batres you do not have any availability until the week of June 13.      Future Appointments   Date Time Provider Eneida Davis   6/10/2022 10:00 AM Serg Gomez, DO [facility]   6/18/2022  1:00 PM [address] [place]   6/18/2022  2:15 PM [address] [place]   9/9/2022 10:40 AM Alfredo Gilbert MD [facility]

## 2022-06-03 NOTE — TELEPHONE ENCOUNTER
You can see if you can find a slot for her on Monday or Tuesday which would be June 6 or June 7, 2022. Any slot will do.

## 2022-06-10 ENCOUNTER — PATIENT MESSAGE (OUTPATIENT)
Dept: FAMILY MEDICINE CLINIC | Facility: CLINIC | Age: 57
End: 2022-06-10

## 2022-06-10 ENCOUNTER — TELEPHONE (OUTPATIENT)
Dept: FAMILY MEDICINE CLINIC | Facility: CLINIC | Age: 57
End: 2022-06-10

## 2022-06-10 NOTE — PATIENT INSTRUCTIONS
Medication reviewed and renewed where needed and appropriate. Comply with medications. Monitor blood pressures and record at home. Limit salt intake. FMLA to be extended for another 6 months. Patient to follow-up via virtual video visit in 2 weeks. Patient encouraged to continue with counseling sessions.

## 2022-06-10 NOTE — TELEPHONE ENCOUNTER
Received call from patient. Patient's date of birth and full name both confirmed. Says her daughter has her car. Unable to drive to appointment today. Asking if appointment can be changed to virtual visit. Appointment reason: Follow-up for FMLA, continuation. Patient scheduled for video visit. Patient advised to complete the e-check in in [employer], if active. Understands to follow the prompts and links to complete the visit. Patient agreed to proceed, they understand the provider may be calling from a blocked, or unknown phone number on their caller ID and they know to answer the phone.     Best call back:   [phone]    RN notified Dr. Mikhail Julian via [id number].    Future Appointments   Date Time Provider Eneida Davis   6/10/2022 10:00 AM DO [facility] Wu   6/18/2022  1:00 PM [address] [place]   6/18/2022  2:15 PM [address] [place]   9/9/2022 10:40 AM Phoenix Gilbert MD [facility]

## 2022-06-18 ENCOUNTER — HOSPITAL ENCOUNTER (OUTPATIENT)
Dept: ULTRASOUND IMAGING | Age: 57
Discharge: HOME | End: 2022-06-18
Attending: NURSE PRACTITIONER
Payer: COMMERCIAL

## 2022-06-18 ENCOUNTER — HOSPITAL ENCOUNTER (OUTPATIENT)
Dept: ULTRASOUND IMAGING | Age: 57
Discharge: HOME | End: 2022-06-18
Attending: INTERNAL MEDICINE
Payer: COMMERCIAL

## 2022-06-18 DIAGNOSIS — R10.2 PELVIC PAIN: ICD-10-CM

## 2022-06-18 DIAGNOSIS — R10.12 LUQ PAIN: ICD-10-CM

## 2022-06-18 DIAGNOSIS — Z90.711 S/P ABDOMINAL SUPRACERVICAL SUBTOTAL HYSTERECTOMY: ICD-10-CM

## 2022-06-18 PROCEDURE — 76705 ECHO EXAM OF ABDOMEN: CPT | Performed by: INTERNAL MEDICINE

## 2022-06-18 PROCEDURE — 76856 US EXAM PELVIC COMPLETE: CPT | Performed by: NURSE PRACTITIONER

## 2022-06-18 PROCEDURE — 76830 TRANSVAGINAL US NON-OB: CPT | Performed by: NURSE PRACTITIONER

## 2022-06-19 DIAGNOSIS — F51.05 INSOMNIA SECONDARY TO DEPRESSION WITH ANXIETY: ICD-10-CM

## 2022-06-19 DIAGNOSIS — F41.8 INSOMNIA SECONDARY TO DEPRESSION WITH ANXIETY: ICD-10-CM

## 2022-06-20 RX ORDER — ZOLPIDEM TARTRATE 12.5 MG/1
TABLET, FILM COATED, EXTENDED RELEASE ORAL
Qty: 30 TABLET | Refills: 0 | Status: SHIPPED | OUTPATIENT
Start: 2022-06-20

## 2022-06-20 NOTE — PROGRESS NOTES
Drake Syed    Your pelvic ultrasound shows both ovaries are normal. Please let me know if you have any questions.     Take care  ZEE Cruz

## 2022-06-25 DIAGNOSIS — G43.001 MIGRAINE WITHOUT AURA AND WITH STATUS MIGRAINOSUS, NOT INTRACTABLE: ICD-10-CM

## 2022-06-27 RX ORDER — TRAMADOL HYDROCHLORIDE 50 MG/1
TABLET ORAL
Qty: 30 TABLET | Refills: 0 | Status: SHIPPED | OUTPATIENT
Start: 2022-06-27

## 2022-06-28 ENCOUNTER — TELEPHONE (OUTPATIENT)
Dept: FAMILY MEDICINE CLINIC | Facility: CLINIC | Age: 57
End: 2022-06-28

## 2022-06-28 ENCOUNTER — PATIENT MESSAGE (OUTPATIENT)
Dept: FAMILY MEDICINE CLINIC | Facility: CLINIC | Age: 57
End: 2022-06-28

## 2022-06-28 NOTE — TELEPHONE ENCOUNTER
FMLA forms received in the [facility] via fax for completion. Forms emailed to [email or web address], original forms left in the [facility].

## 2022-06-28 NOTE — TELEPHONE ENCOUNTER
Álvarola received in forms dept. Logged for processing. Valid Chencho Garcia on file. Expires 11/24/22.

## 2022-06-28 NOTE — TELEPHONE ENCOUNTER
Patient calling ( identified name and  ) states did speak  With the forms department and she then did speak with her insurance   ( [employer] )     [employer] will be sending  ( via fax ) to our on- site office for the forms to be completed by  by Dr. Annabelle Larson   [facility] )       [facility] staff please be aware of incoming fax       Also mentioned her b/p has been elevated for the past 2 weeks   Denies chest pain, no vision changes but having headaches , believes all is related to this work stress situation     150/108 on Saturday B/p   138/101  On       144/100 last night    145/100 today       Asking if she needs to increase her b/p meds  ( take meds as directed )      Allergies reviewed and pharmacy confirmed    Please advise and thank you.

## 2022-06-29 ENCOUNTER — HOSPITAL ENCOUNTER (EMERGENCY)
Facility: HOSPITAL | Age: 57
Discharge: HOME | End: 2022-06-29
Attending: EMERGENCY MEDICINE
Payer: COMMERCIAL

## 2022-06-29 ENCOUNTER — TELEPHONE (OUTPATIENT)
Dept: FAMILY MEDICINE CLINIC | Facility: CLINIC | Age: 57
End: 2022-06-29

## 2022-06-29 VITALS
SYSTOLIC BLOOD PRESSURE: 129 MMHG | BODY MASS INDEX: 32.99 KG/M2 | RESPIRATION RATE: 22 BRPM | OXYGEN SATURATION: 98 % | DIASTOLIC BLOOD PRESSURE: 79 MMHG | TEMPERATURE: 98 F | WEIGHT: 198 LBS | HEART RATE: 71 BPM | HEIGHT: 65 IN

## 2022-06-29 DIAGNOSIS — R03.0 ELEVATED BLOOD PRESSURE READING: Primary | ICD-10-CM

## 2022-06-29 LAB
ANION GAP SERPL CALC-SCNC: 7 MMOL/L (ref 0–18)
BUN BLD-MCNC: 14 MG/DL (ref 7–18)
BUN/CREAT SERPL: 12.5 (ref 10–20)
CALCIUM BLD-MCNC: 9.2 MG/DL (ref 8.5–10.1)
CHLORIDE SERPL-SCNC: 109 MMOL/L (ref 98–112)
CO2 SERPL-SCNC: 27 MMOL/L (ref 21–32)
CREAT BLD-MCNC: 1.12 MG/DL
GLUCOSE BLD-MCNC: 96 MG/DL (ref 70–99)
OSMOLALITY SERPL CALC.SUM OF ELEC: 296 MOSM/KG (ref 275–295)
POTASSIUM SERPL-SCNC: 3.6 MMOL/L (ref 3.5–5.1)
SODIUM SERPL-SCNC: 143 MMOL/L (ref 136–145)
TROPONIN I HIGH SENSITIVITY: <3 NG/L

## 2022-06-29 PROCEDURE — 84484 ASSAY OF TROPONIN QUANT: CPT | Performed by: EMERGENCY MEDICINE

## 2022-06-29 PROCEDURE — 36415 COLL VENOUS BLD VENIPUNCTURE: CPT

## 2022-06-29 PROCEDURE — 93005 ELECTROCARDIOGRAM TRACING: CPT

## 2022-06-29 PROCEDURE — 80048 BASIC METABOLIC PNL TOTAL CA: CPT | Performed by: EMERGENCY MEDICINE

## 2022-06-29 PROCEDURE — 99284 EMERGENCY DEPT VISIT MOD MDM: CPT

## 2022-06-29 PROCEDURE — 93010 ELECTROCARDIOGRAM REPORT: CPT | Performed by: EMERGENCY MEDICINE

## 2022-06-29 NOTE — TELEPHONE ENCOUNTER
The patient needs to continue to monitor her blood pressures at home and if she has the resources. I saw a note from specialist who has adjusted her medication and I will not be making any adjustments for at least 1 week to see if the current adjustments improve her blood pressure measures. If she has chest pain, headaches, dizziness, visual changes or any other acute symptoms or changes in her status, she needs to proceed to the emergency room. I do not see anywhere on my schedule until may be next Friday for this patient to be seen. You can put her in as a double book for July 8, 2022 at 1 PM, but tell the patient to please be at the clinic at 12:20 pm.  This is the time that she will be seen on that day.

## 2022-06-29 NOTE — TELEPHONE ENCOUNTER
I will not make any promised to me any form deadlines. I will address this form when it is made available to me.

## 2022-06-29 NOTE — TELEPHONE ENCOUNTER
Called patient, confirmed name and . Patient verbalized understanding and agrees to plan. I instructed her to arrive on  by 12:15. Future Appointments   Date Time Provider Eneida Davis   2022  1:00 PM DO [facility]   2022 10:40 AM Adelaida Duarte MD [facility]     She did clarified that she is taking amlodipine 2.5mg daily and hydrochlorothiazide 12.5mg daily and will continue to do so unless instructed otherwise.

## 2022-06-29 NOTE — ED QUICK NOTES
Patient A&OX4, PT denies dizziness/lightheadedness, cp, sob, n/v, discharge paperwork, medication change, and follow-up discussed with pt, pt verbally understands them. Pt discharged ambulatory with steady gait - no distress noted.

## 2022-06-30 ENCOUNTER — TELEPHONE (OUTPATIENT)
Dept: FAMILY MEDICINE CLINIC | Facility: CLINIC | Age: 57
End: 2022-06-30

## 2022-06-30 DIAGNOSIS — I10 ESSENTIAL HYPERTENSION: Primary | ICD-10-CM

## 2022-06-30 RX ORDER — AMLODIPINE BESYLATE 5 MG/1
5 TABLET ORAL DAILY
Qty: 90 TABLET | Refills: 1 | Status: SHIPPED | OUTPATIENT
Start: 2022-06-30

## 2022-06-30 NOTE — TELEPHONE ENCOUNTER
Dr. Aaron Raman,   Patient aware to double up on Amlodipine, She will need refill. Not sure if you want her to take 2.5 mg twice daily or 5 mg once daily? Please advise, she has follow up appointment 7/8/22.  Thanks

## 2022-06-30 NOTE — TELEPHONE ENCOUNTER
Patient aware forms have been sent to forms department. They will be contacting her in regards to them.

## 2022-07-06 DIAGNOSIS — I10 ESSENTIAL HYPERTENSION: Primary | ICD-10-CM

## 2022-07-06 RX ORDER — ATORVASTATIN CALCIUM 40 MG/1
TABLET, FILM COATED ORAL
Qty: 90 TABLET | Refills: 1 | Status: SHIPPED | OUTPATIENT
Start: 2022-07-06

## 2022-07-06 RX ORDER — HYDROCHLOROTHIAZIDE 12.5 MG/1
TABLET ORAL
Qty: 90 TABLET | Refills: 3 | Status: SHIPPED | OUTPATIENT
Start: 2022-07-06

## 2022-07-06 RX ORDER — AMLODIPINE BESYLATE 2.5 MG/1
5 TABLET ORAL DAILY
Qty: 90 TABLET | Refills: 1 | Status: SHIPPED | OUTPATIENT
Start: 2022-07-06 | End: 2022-07-06

## 2022-07-06 RX ORDER — AMLODIPINE BESYLATE 2.5 MG/1
5 TABLET ORAL DAILY
Qty: 180 TABLET | Refills: 1 | Status: SHIPPED | OUTPATIENT
Start: 2022-07-06

## 2022-07-08 ENCOUNTER — TELEPHONE (OUTPATIENT)
Dept: FAMILY MEDICINE CLINIC | Facility: CLINIC | Age: 57
End: 2022-07-08

## 2022-07-08 ENCOUNTER — PATIENT MESSAGE (OUTPATIENT)
Dept: FAMILY MEDICINE CLINIC | Facility: CLINIC | Age: 57
End: 2022-07-08

## 2022-07-08 NOTE — TELEPHONE ENCOUNTER
Routed to Dr. Porfirio Craig as FYI: see patient's MyChart, blood pressure self-reporting 7/8/22  Blood pressure: 154/98  Pulse: 82

## 2022-07-08 NOTE — TELEPHONE ENCOUNTER
Dr. Rick First     Please sign off on form: FMLA   -Highlight the patient and hit \"Chart\" button. -In Chart Review, w/in the Encounter tab - click 1 time on the Telephone call encounter for 6/28/22Scroll down the telephone encounter.  -Click \"scan on\" blue Hyperlink under \"Media\" heading for FMLA Dr Rick First 7/8/22  w/in the telephone enc.  -Click on Acknowledge button at the bottom right corner and left-click onto image, signature stamp appears and drag signature to Provider signature line. Stamp will turn blue. Close window.      Thank you,  Olivia Cruz

## 2022-07-08 NOTE — PATIENT INSTRUCTIONS
I am in full support of continuous FMLA at least for 30 days and we can reassess to see if we need to extend this. Patient has been encouraged to continue with her mental health specialist.  Patient encouraged to make sure that she is taking all her medications as prescribed especially the new addition to the antihypertensive therapy. Exercise stressed and emphasized and encouraged. Continue with spiritual health as well.

## 2022-07-08 NOTE — TELEPHONE ENCOUNTER
Alpesh Sands pt stated that she has a video visit with you today and she forgot to ask you when do you want her to f/u with you.  Please advise

## 2022-07-27 RX ORDER — LANSOPRAZOLE 30 MG/1
CAPSULE, DELAYED RELEASE ORAL
Qty: 90 CAPSULE | Refills: 0 | Status: SHIPPED | OUTPATIENT
Start: 2022-07-27

## 2022-07-28 ENCOUNTER — TELEPHONE (OUTPATIENT)
Dept: GASTROENTEROLOGY | Facility: CLINIC | Age: 57
End: 2022-07-28

## 2022-08-01 NOTE — TELEPHONE ENCOUNTER
Medication PA Requested: LANSOPRAZOLE 30 MG Oral Capsule Delayed Release                                                         CoverMyMeds Used:  Key:  Quantity:  Day Supply:  Sig:   DX Code:                                     CPT code (if applicable):   Case Number/Pending Ref#:      Per TE 2/3/2022-no pa required. Called [employer] help desk at  [phone]. Spoke with Estuardo. He states he has restricted access and needs to transfer me to another team internally- transferred me to [employer]/pharmacy help desk. She states patient can get 90 days supply through [facility] order $16.00. Also, If gets at [employer] on [address] needs to get 30 days suppy and cost is around $6.00 for 30 for 30. No PA required. Call ref # Marito Soriano  9:12 in [facility]. My chart message sent to patient of above.

## 2022-08-02 ENCOUNTER — TELEPHONE (OUTPATIENT)
Dept: FAMILY MEDICINE CLINIC | Facility: CLINIC | Age: 57
End: 2022-08-02

## 2022-08-02 NOTE — TELEPHONE ENCOUNTER
[employer] is requesting additional information for a disability claim they received for patient. Request emailed to [email or web address], original request left in the [facility].

## 2022-08-02 NOTE — TELEPHONE ENCOUNTER
Forms received and logged for processing.  Valid [facility] Nataliia Jason

## 2022-09-26 DIAGNOSIS — K57.92 DIVERTICULITIS: ICD-10-CM

## 2022-09-26 DIAGNOSIS — F41.8 INSOMNIA SECONDARY TO DEPRESSION WITH ANXIETY: ICD-10-CM

## 2022-09-26 DIAGNOSIS — F51.05 INSOMNIA SECONDARY TO DEPRESSION WITH ANXIETY: ICD-10-CM

## 2022-09-26 RX ORDER — ZOLPIDEM TARTRATE 12.5 MG/1
TABLET, FILM COATED, EXTENDED RELEASE ORAL
Qty: 30 TABLET | Refills: 0 | Status: SHIPPED | OUTPATIENT
Start: 2022-09-26

## 2022-09-26 RX ORDER — METRONIDAZOLE 500 MG/1
TABLET ORAL
Qty: 20 TABLET | Refills: 0 | Status: SHIPPED | OUTPATIENT
Start: 2022-09-26

## 2022-09-26 NOTE — TELEPHONE ENCOUNTER
Protocol failed or has No Protocol, please review    Routing as Dr. Ondina Batres  is out of office     Requested Prescriptions   Pending Prescriptions Disp Refills    ZOLPIDEM TARTRATE ER 12.5 MG Oral Tab CR [Pharmacy Med Name: ZOLPIDEM ER 12.5MG TABLETS] 30 tablet 0     Sig: TAKE 1 TABLET(12.5 MG) BY MOUTH EVERY NIGHT AS NEEDED FOR SLEEP        There is no refill protocol information for this order        METRONIDAZOLE 500 MG Oral Tab [Pharmacy Med Name: METRONIDAZOLE 500MG TABLETS] 20 tablet 0     Sig: TAKE 1 TABLET(500 MG) BY MOUTH TWICE DAILY        There is no refill protocol information for this order           Recent Outpatient Visits              2 months ago Persistent adjustment disorder with mixed anxiety and depressed mood    [address], Gera LUNA, DO    Telemedicine    3 months ago Insomnia secondary to depression with anxiety    [address] [facility] [place], Olivia Lamb, DO    Telemedicine    4 months ago Encounter for annual routine gynecological examination    [facility], [address] [facility],[address], [facility] [address], Jennifer Davis, [employer] Visit    5 months ago Stress at work    [address] , [facility], [address] [place] [address] Visit    6 months ago Stress at work    [address], [place], [place]    Office Visit

## 2022-11-14 RX ORDER — LANSOPRAZOLE 30 MG/1
30 CAPSULE, DELAYED RELEASE ORAL
Qty: 90 CAPSULE | Refills: 0 | Status: SHIPPED | OUTPATIENT
Start: 2022-11-14

## 2022-11-14 NOTE — TELEPHONE ENCOUNTER
Rx request for Lansoprazole 30 mg, please review and sign off if appropriate. Thank you.     Last seen: 11/11/21  Last refill: 7/27/22

## 2022-12-20 RX ORDER — ATORVASTATIN CALCIUM 40 MG/1
TABLET, FILM COATED ORAL
Qty: 90 TABLET | Refills: 1 | Status: SHIPPED | OUTPATIENT
Start: 2022-12-20

## 2022-12-20 NOTE — TELEPHONE ENCOUNTER
Please review. Protocol failed.   Requested Prescriptions   Pending Prescriptions Disp Refills    ATORVASTATIN 40 MG Oral Tab [Pharmacy Med Name: ATORVASTATIN 40MG TABLETS] 90 tablet 1     Sig: TAKE 1 TABLET(40 MG) BY MOUTH EVERY NIGHT       Cholesterol Medication Protocol Failed - 12/20/2022 11:09 AM        Failed - ALT in past 12 months        Failed - LDL in past 12 months        Failed - Last ALT < 80     Lab Results   Component Value Date    ALT 42 12/16/2021             Failed - Last LDL < 130     Lab Results   Component Value Date     12/16/2021             Passed - In person appointment or virtual visit in the past 12 mos or appointment in next 3 mos     Recent Outpatient Visits              5 months ago Persistent adjustment disorder with mixed anxiety and depressed mood    [address] Forrest J, DO    Telemedicine    6 months ago Insomnia secondary to depression with anxiety    [address] Ruthell Rule, DO    Telemedicine    7 months ago Encounter for annual routine gynecological examination    [facility] [place], [address], Yuri Patel, [employer] Visit    7 months ago Stress at work    [address], Charlie Guillen, [facility]  [address]     9 months ago Stress at work    [address], [place] Guill[place], [place]    Office Visit

## 2022-12-28 ENCOUNTER — TELEPHONE (OUTPATIENT)
Dept: FAMILY MEDICINE CLINIC | Facility: CLINIC | Age: 57
End: 2022-12-28

## 2022-12-28 RX ORDER — TRAMADOL HYDROCHLORIDE 50 MG/1
50 TABLET ORAL EVERY 6 HOURS PRN
Qty: 20 TABLET | Refills: 1 | Status: SHIPPED
Start: 2022-12-28

## 2022-12-28 RX ORDER — OSELTAMIVIR PHOSPHATE 75 MG/1
75 CAPSULE ORAL 2 TIMES DAILY
Qty: 10 CAPSULE | Refills: 0 | Status: SHIPPED | OUTPATIENT
Start: 2022-12-28 | End: 2023-01-02

## 2022-12-28 NOTE — TELEPHONE ENCOUNTER
On-call page-patient with sore throat, cough, chills, myalgias. Symptoms started yesterday. Was around family member with flu symptoms. Recommend rest, push fluids, Tylenol or ibuprofen. She is requesting refill for tramadol for headaches. Tamiflu as directed reviewed instructions and side effects. Call if not improved in 5 days sooner if worsening.

## 2022-12-28 NOTE — TELEPHONE ENCOUNTER
Pharmacy called for EDSON number of Dr. Kailee Boston. Relayed information for her so Tramadol could be filled.

## 2022-12-28 NOTE — TELEPHONE ENCOUNTER
Per patient pharmacy did not receive rxs signed this morning. Tramadol was sent as fax. Rx called to pharmacy. Patient notified.

## 2022-12-30 NOTE — TELEPHONE ENCOUNTER
Patient contact clinic today reporting her symptoms are improving but she is still feeling fatigued. Voice is audibly hoarse. Denies fever, body aches or chills. Denies chest pain or shortness of breath. She is still taking tamiflu. OTC ibuprofen. She is requesting a note to return to work 1/3/23. Pended for signature under communications, Dr. Lillian Cheng please sign off if you approve.

## 2023-01-04 DIAGNOSIS — B34.9 ACUTE VIRAL SYNDROME: Primary | ICD-10-CM

## 2023-01-04 DIAGNOSIS — J98.01 COUGH DUE TO BRONCHOSPASM: ICD-10-CM

## 2023-01-04 RX ORDER — ALBUTEROL SULFATE 90 UG/1
2 AEROSOL, METERED RESPIRATORY (INHALATION) EVERY 6 HOURS PRN
Qty: 18 G | Refills: 0 | Status: SHIPPED | OUTPATIENT
Start: 2023-01-04

## 2023-01-04 RX ORDER — DEXTROMETHORPHAN HYDROBROMIDE AND PROMETHAZINE HYDROCHLORIDE 15; 6.25 MG/5ML; MG/5ML
5 SYRUP ORAL 4 TIMES DAILY PRN
Qty: 240 ML | Refills: 0 | Status: SHIPPED | OUTPATIENT
Start: 2023-01-04

## 2023-02-01 ENCOUNTER — NURSE TRIAGE (OUTPATIENT)
Dept: FAMILY MEDICINE CLINIC | Facility: CLINIC | Age: 58
End: 2023-02-01

## 2023-02-01 ENCOUNTER — TELEPHONE (OUTPATIENT)
Dept: FAMILY MEDICINE CLINIC | Facility: CLINIC | Age: 58
End: 2023-02-01

## 2023-02-01 ENCOUNTER — APPOINTMENT (OUTPATIENT)
Dept: GENERAL RADIOLOGY | Facility: HOSPITAL | Age: 58
End: 2023-02-01
Payer: COMMERCIAL

## 2023-02-01 ENCOUNTER — HOSPITAL ENCOUNTER (EMERGENCY)
Facility: HOSPITAL | Age: 58
Discharge: HOME | End: 2023-02-01
Attending: EMERGENCY MEDICINE
Payer: COMMERCIAL

## 2023-02-01 VITALS
SYSTOLIC BLOOD PRESSURE: 115 MMHG | OXYGEN SATURATION: 96 % | RESPIRATION RATE: 16 BRPM | DIASTOLIC BLOOD PRESSURE: 74 MMHG | HEART RATE: 79 BPM | BODY MASS INDEX: 33.99 KG/M2 | WEIGHT: 204 LBS | TEMPERATURE: 99 F | HEIGHT: 65 IN

## 2023-02-01 DIAGNOSIS — R07.89 CHEST PAIN, ATYPICAL: Primary | ICD-10-CM

## 2023-02-01 LAB
ANION GAP SERPL CALC-SCNC: 7 MMOL/L (ref 0–18)
ATRIAL RATE: 74 BPM
ATRIAL RATE: 80 BPM
BASOPHILS # BLD AUTO: 0.04 X10(3) UL (ref 0–0.2)
BASOPHILS NFR BLD AUTO: 0.5 %
BUN BLD-MCNC: 19 MG/DL (ref 7–18)
BUN/CREAT SERPL: 19 (ref 10–20)
CALCIUM BLD-MCNC: 9.6 MG/DL (ref 8.5–10.1)
CHLORIDE SERPL-SCNC: 105 MMOL/L (ref 98–112)
CO2 SERPL-SCNC: 29 MMOL/L (ref 21–32)
CREAT BLD-MCNC: 1 MG/DL
D DIMER PPP FEU-MCNC: <0.27 UG/ML FEU (ref ?–0.57)
DEPRECATED RDW RBC AUTO: 41 FL (ref 35.1–46.3)
EOSINOPHIL # BLD AUTO: 0.2 X10(3) UL (ref 0–0.7)
EOSINOPHIL NFR BLD AUTO: 2.3 %
ERYTHROCYTE [DISTWIDTH] IN BLOOD BY AUTOMATED COUNT: 13.3 % (ref 11–15)
GFR SERPLBLD BASED ON 1.73 SQ M-ARVRAT: 66 ML/MIN/1.73M2 (ref 60–?)
GLUCOSE BLD-MCNC: 114 MG/DL (ref 70–99)
HCT VFR BLD AUTO: 42.6 %
HGB BLD-MCNC: 13.7 G/DL
IMM GRANULOCYTES # BLD AUTO: 0.02 X10(3) UL (ref 0–1)
IMM GRANULOCYTES NFR BLD: 0.2 %
LYMPHOCYTES # BLD AUTO: 3.6 X10(3) UL (ref 1–4)
LYMPHOCYTES NFR BLD AUTO: 42 %
MCH RBC QN AUTO: 26.8 PG (ref 26–34)
MCHC RBC AUTO-ENTMCNC: 32.2 G/DL (ref 31–37)
MCV RBC AUTO: 83.2 FL
MONOCYTES # BLD AUTO: 0.55 X10(3) UL (ref 0.1–1)
MONOCYTES NFR BLD AUTO: 6.4 %
NEUTROPHILS # BLD AUTO: 4.17 X10 (3) UL (ref 1.5–7.7)
NEUTROPHILS # BLD AUTO: 4.17 X10(3) UL (ref 1.5–7.7)
NEUTROPHILS NFR BLD AUTO: 48.6 %
OSMOLALITY SERPL CALC.SUM OF ELEC: 295 MOSM/KG (ref 275–295)
P AXIS: 54 DEGREES
P AXIS: 67 DEGREES
P-R INTERVAL: 176 MS
P-R INTERVAL: 176 MS
PLATELET # BLD AUTO: 276 10(3)UL (ref 150–450)
POTASSIUM SERPL-SCNC: 3.5 MMOL/L (ref 3.5–5.1)
Q-T INTERVAL: 406 MS
Q-T INTERVAL: 424 MS
QRS DURATION: 84 MS
QRS DURATION: 86 MS
QTC CALCULATION (BEZET): 468 MS
QTC CALCULATION (BEZET): 470 MS
R AXIS: 10 DEGREES
R AXIS: 13 DEGREES
RBC # BLD AUTO: 5.12 X10(6)UL
SODIUM SERPL-SCNC: 141 MMOL/L (ref 136–145)
T AXIS: 27 DEGREES
T AXIS: 29 DEGREES
TROPONIN I HIGH SENSITIVITY: 3 NG/L
TROPONIN I HIGH SENSITIVITY: 4 NG/L
VENTRICULAR RATE: 74 BPM
VENTRICULAR RATE: 80 BPM
WBC # BLD AUTO: 8.6 X10(3) UL (ref 4–11)

## 2023-02-01 PROCEDURE — 84484 ASSAY OF TROPONIN QUANT: CPT | Performed by: EMERGENCY MEDICINE

## 2023-02-01 PROCEDURE — 71045 X-RAY EXAM CHEST 1 VIEW: CPT | Performed by: EMERGENCY MEDICINE

## 2023-02-01 PROCEDURE — 36415 COLL VENOUS BLD VENIPUNCTURE: CPT

## 2023-02-01 PROCEDURE — 85379 FIBRIN DEGRADATION QUANT: CPT | Performed by: EMERGENCY MEDICINE

## 2023-02-01 PROCEDURE — 99285 EMERGENCY DEPT VISIT HI MDM: CPT

## 2023-02-01 PROCEDURE — 85025 COMPLETE CBC W/AUTO DIFF WBC: CPT | Performed by: EMERGENCY MEDICINE

## 2023-02-01 PROCEDURE — 93010 ELECTROCARDIOGRAM REPORT: CPT

## 2023-02-01 PROCEDURE — 80048 BASIC METABOLIC PNL TOTAL CA: CPT | Performed by: EMERGENCY MEDICINE

## 2023-02-01 PROCEDURE — 93005 ELECTROCARDIOGRAM TRACING: CPT

## 2023-02-01 PROCEDURE — 99284 EMERGENCY DEPT VISIT MOD MDM: CPT

## 2023-02-01 NOTE — TELEPHONE ENCOUNTER
Patient stated that her blood pressure has been elevated since Saturday 1/28/2023. This morning blood pressure is 157/110. Since this morning feels short of breath, has chest pain, nausea, and numbness. Stated that she is at work but ambulance will be taking her to [facility] now. JUSTIN

## 2023-02-01 NOTE — TELEPHONE ENCOUNTER
Verified name and . Patient seen in emergency room today 23- requesting emergency room follow up appointment.     Appointment scheduled:    Future Appointments   Date Time Provider Eneida Davis   2/3/2023 10:45 AM Adrian Owens MD [address] [place] [address]

## 2023-02-03 ENCOUNTER — OFFICE VISIT (OUTPATIENT)
Dept: FAMILY MEDICINE CLINIC | Facility: CLINIC | Age: 58
End: 2023-02-03

## 2023-02-03 VITALS
HEART RATE: 81 BPM | DIASTOLIC BLOOD PRESSURE: 68 MMHG | WEIGHT: 200.38 LBS | BODY MASS INDEX: 33 KG/M2 | SYSTOLIC BLOOD PRESSURE: 105 MMHG | TEMPERATURE: 98 F

## 2023-02-03 DIAGNOSIS — G43.001 MIGRAINE WITHOUT AURA AND WITH STATUS MIGRAINOSUS, NOT INTRACTABLE: ICD-10-CM

## 2023-02-03 DIAGNOSIS — L20.9 ATOPIC DERMATITIS, UNSPECIFIED TYPE: ICD-10-CM

## 2023-02-03 DIAGNOSIS — J02.9 SORE THROAT: Primary | ICD-10-CM

## 2023-02-03 DIAGNOSIS — K57.92 DIVERTICULITIS: ICD-10-CM

## 2023-02-03 DIAGNOSIS — I10 ESSENTIAL HYPERTENSION: ICD-10-CM

## 2023-02-03 DIAGNOSIS — F41.8 INSOMNIA SECONDARY TO DEPRESSION WITH ANXIETY: ICD-10-CM

## 2023-02-03 DIAGNOSIS — R07.9 CHEST PAIN, UNSPECIFIED TYPE: ICD-10-CM

## 2023-02-03 DIAGNOSIS — Z12.31 ENCOUNTER FOR SCREENING MAMMOGRAM FOR MALIGNANT NEOPLASM OF BREAST: ICD-10-CM

## 2023-02-03 DIAGNOSIS — F51.05 INSOMNIA SECONDARY TO DEPRESSION WITH ANXIETY: ICD-10-CM

## 2023-02-03 LAB
CONTROL LINE PRESENT WITH A CLEAR BACKGROUND (YES/NO): YES YES/NO
KIT LOT #: NORMAL NUMERIC
STREP GRP A CUL-SCR: POSITIVE

## 2023-02-03 PROCEDURE — 3074F SYST BP LT 130 MM HG: CPT | Performed by: FAMILY MEDICINE

## 2023-02-03 PROCEDURE — 87880 STREP A ASSAY W/OPTIC: CPT | Performed by: FAMILY MEDICINE

## 2023-02-03 PROCEDURE — 3078F DIAST BP <80 MM HG: CPT | Performed by: FAMILY MEDICINE

## 2023-02-03 PROCEDURE — 99214 OFFICE O/P EST MOD 30 MIN: CPT | Performed by: FAMILY MEDICINE

## 2023-02-03 RX ORDER — METRONIDAZOLE 500 MG/1
500 TABLET ORAL 2 TIMES DAILY
Qty: 20 TABLET | Refills: 0 | Status: SHIPPED | OUTPATIENT
Start: 2023-02-03

## 2023-02-03 RX ORDER — AMLODIPINE BESYLATE 10 MG/1
10 TABLET ORAL DAILY
Qty: 90 TABLET | Refills: 3 | Status: SHIPPED | OUTPATIENT
Start: 2023-02-03

## 2023-02-03 RX ORDER — TRIAMCINOLONE ACETONIDE 1 MG/G
CREAM TOPICAL 2 TIMES DAILY
Qty: 80 G | Refills: 1 | Status: SHIPPED | OUTPATIENT
Start: 2023-02-03

## 2023-02-03 RX ORDER — PANTOPRAZOLE SODIUM 20 MG/1
TABLET, DELAYED RELEASE ORAL
Refills: 0 | Status: CANCELLED | OUTPATIENT
Start: 2023-02-03

## 2023-02-03 RX ORDER — TRAMADOL HYDROCHLORIDE 50 MG/1
50 TABLET ORAL EVERY 6 HOURS PRN
Qty: 30 TABLET | Refills: 0 | Status: SHIPPED | OUTPATIENT
Start: 2023-02-03

## 2023-02-03 RX ORDER — VALSARTAN AND HYDROCHLOROTHIAZIDE 80; 12.5 MG/1; MG/1
1 TABLET, FILM COATED ORAL DAILY
Qty: 90 TABLET | Refills: 3 | Status: SHIPPED | OUTPATIENT
Start: 2023-02-03

## 2023-02-03 RX ORDER — PENICILLIN V POTASSIUM 500 MG/1
500 TABLET ORAL 3 TIMES DAILY
Qty: 30 TABLET | Refills: 0 | Status: SHIPPED | OUTPATIENT
Start: 2023-02-03 | End: 2023-02-13

## 2023-02-03 RX ORDER — AMLODIPINE BESYLATE 5 MG/1
5 TABLET ORAL DAILY
Qty: 90 TABLET | Refills: 1 | Status: CANCELLED | OUTPATIENT
Start: 2023-02-03

## 2023-02-03 RX ORDER — HYDROCHLOROTHIAZIDE 12.5 MG/1
12.5 TABLET ORAL DAILY
Qty: 90 TABLET | Refills: 3 | Status: CANCELLED | OUTPATIENT
Start: 2023-02-03

## 2023-02-03 RX ORDER — LANSOPRAZOLE 30 MG/1
30 CAPSULE, DELAYED RELEASE ORAL
Qty: 90 CAPSULE | Refills: 3 | Status: SHIPPED | OUTPATIENT
Start: 2023-02-03

## 2023-02-03 RX ORDER — AMLODIPINE BESYLATE 10 MG/1
10 TABLET ORAL DAILY
COMMUNITY
End: 2023-02-03

## 2023-02-03 RX ORDER — ZOLPIDEM TARTRATE 12.5 MG/1
12.5 TABLET, FILM COATED, EXTENDED RELEASE ORAL NIGHTLY PRN
Qty: 30 TABLET | Refills: 0 | Status: SHIPPED | OUTPATIENT
Start: 2023-02-03

## 2023-02-06 ENCOUNTER — PATIENT MESSAGE (OUTPATIENT)
Dept: FAMILY MEDICINE CLINIC | Facility: CLINIC | Age: 58
End: 2023-02-06

## 2023-02-09 ENCOUNTER — TELEMEDICINE (OUTPATIENT)
Dept: FAMILY MEDICINE CLINIC | Facility: CLINIC | Age: 58
End: 2023-02-09

## 2023-02-09 DIAGNOSIS — B34.9 VIRAL SYNDROME: Primary | ICD-10-CM

## 2023-02-09 PROCEDURE — 99212 OFFICE O/P EST SF 10 MIN: CPT | Performed by: FAMILY MEDICINE

## 2023-02-10 ENCOUNTER — TELEPHONE (OUTPATIENT)
Dept: FAMILY MEDICINE CLINIC | Facility: CLINIC | Age: 58
End: 2023-02-10

## 2023-02-10 NOTE — TELEPHONE ENCOUNTER
Pt stated 2 hrs ago she started feeling dizzy, vomited/diarrhea,2/3/23 - treated for strep- taking Abx but did not take today, not eating but trying to drink, advised UC/ER, stated she do not have anyone to take her , offered 911, decline stated she will wait to see if s/s get better advised vertigo could be for many reasons (ears,decrease fld IT,weakness,electrolyte imbalance), concern of dehydration  Pt stated she will again wait   Will f/u tomorrow

## 2023-02-12 ENCOUNTER — PATIENT MESSAGE (OUTPATIENT)
Dept: FAMILY MEDICINE CLINIC | Facility: CLINIC | Age: 58
End: 2023-02-12

## 2023-02-15 NOTE — TELEPHONE ENCOUNTER
Patient aware form was signed, Mailed copy to patient. To her address [address], [address], [address].

## 2023-02-16 NOTE — TELEPHONE ENCOUNTER
Patient calling back (identified name and ) to provide fax number. Patient will be faxing the forms over to the office shortly. Once form completed, please fax to: Attention: Geovany Jackman  Fax: [phone]    [facility] staff notified. Valentín Tyler was with a patient.

## 2023-02-16 NOTE — TELEPHONE ENCOUNTER
Spoke to patient (name and  of patient verified). She reports the completed form needs to be submitted before 10 am today. Patient reports she is unable to start orientation/onboarding until the form is submitted and reviewed. Patient states she was told there is a portion of the form that is incomplete that she needs to fill out before form can be submitted. Patient states she is able to drive to office, but it will take her over an hour each way. She is requesting that form is emailed to her. Informed patient that form can only be faxed or picked up at office. Patient reports she does not have a fax machine but will see if she can use one nearby and call back with fax number or come to the office, if needed. Triage RN, please document fax number and route to [facility]. Please also notify Alo Carrillo, [address] [place], [place] [address]sarahi working with Dr. Magi Baig, since form submission is time sensitive. Thank you.

## 2023-02-16 NOTE — TELEPHONE ENCOUNTER
Onsite Clinical ---- missing the Medical Exemption Form. Pt says our office should have this form. Please send. Attention: Catherine Mendoza   Fax: [phone]    Patient called office. Patient's date of birth and full name both confirmed.      Vaccination form was received, but   Did not get medical exemption form

## 2023-02-16 NOTE — TELEPHONE ENCOUNTER
Vaccine exemption form signed, verbal from Dr. Sy Finders he will not fill out [employer] exemption she would need to get that from signed and filled out on her own. Patient understood.

## 2023-02-16 NOTE — TELEPHONE ENCOUNTER
Clinical staff, please assist    Medical exemption form needs to be faxed ASAP per patient. She is waiting for it.     Attention: Rufus Galicia  Fax: [phone]

## 2023-03-03 ENCOUNTER — TELEPHONE (OUTPATIENT)
Facility: CLINIC | Age: 58
End: 2023-03-03

## 2023-03-03 NOTE — TELEPHONE ENCOUNTER
1st reminder letter sent out via mail and [employer] for the following:   Alicia Hobbs (PLG=[id number]) (Order #[id number]) on 3/15/22

## 2023-05-08 NOTE — TELEPHONE ENCOUNTER
Dr. Malou Thomas    Refill request received from [facility] for lansoprazole. The prescription sent by PCP was \"closed out\" so no refills left. Would you be willing to refill it?     Thank you

## 2023-05-09 NOTE — TELEPHONE ENCOUNTER
Sent in refills on lansoprazole x 90 days  Please have her follow up in the office with [facility] or myself.

## 2023-05-17 NOTE — TELEPHONE ENCOUNTER
Please review. Protocol failed or has no protocol.     Requested Prescriptions   Pending Prescriptions Disp Refills    ZOLPIDEM TARTRATE ER 12.5 MG Oral Tab CR [Pharmacy Med Name: ZOLPIDEM ER 12.5MG TABLETS] 30 tablet 0     Sig: TAKE 1 TABLET(12.5 MG) BY MOUTH EVERY NIGHT AS NEEDED       There is no refill protocol information for this order      Refused Prescriptions Disp Refills    METRONIDAZOLE 500 MG Oral Tab [Pharmacy Med Name: METRONIDAZOLE 500MG TABLETS] 20 tablet 0     Sig: TAKE 1 TABLET(500 MG) BY MOUTH TWICE DAILY       There is no refill protocol information for this order          Recent Outpatient Visits              2 months ago Wellness examination    [address]    Nurse Only    3 months ago Viral syndrome    [facility], Syl Abdi MD    Telemedicine    3 months ago Sore throat    [address], [place] [address], [address] [facility] MD Martín    Office Visit    10 months ago Persistent adjustment disorder with mixed anxiety and depressed mood    [facility], [address], Gera LUNA, DO    Telemedicine    11 months ago Insomnia secondary to depression with anxiety    [facility], [address], DO    Telemedicine            Future Appointments         Provider Department Appt Notes    In 1 month Patty Rivera PA-C [address], [facility]

## 2023-05-17 NOTE — TELEPHONE ENCOUNTER
Called patient, confirmed name and . Patient advised that prescription sent.   Patient advised to call back if symptoms persist.

## 2023-05-24 NOTE — TELEPHONE ENCOUNTER
Triamcinolone was refilled today. Please advise on tramadol    Requested Prescriptions     Pending Prescriptions Disp Refills    triamcinolone 0.1 % External Cream 80 g 1     Sig: Apply topically 2 (two) times daily. traMADol 50 MG Oral Tab 30 tablet 0     Sig: Take 1 tablet (50 mg total) by mouth every 6 (six) hours as needed. Recent Visits  Date Type Provider Dept   02/03/23 Office Visit Cici Burr MD [facility]   04/29/22 Office Visit Malou Marshall, DO [facility]   03/11/22 Office Visit Malou Marshall, DO [facility]   02/11/22 Office Visit Malou Marshall, DO [facility]   01/21/22 Office Visit Malou Marshall, DO [facility]   12/30/21 Office Visit Malou Marshall, DO [facility]   12/16/21 Office Visit Malou Marshall, DO [facility]   Showing recent visits within past 540 days with a meds authorizing provider and meeting all other requirements  Future Appointments  No visits were found meeting these conditions. Showing future appointments within next 150 days with a meds authorizing provider and meeting all other requirements     Requested Prescriptions   Pending Prescriptions Disp Refills    triamcinolone 0.1 % External Cream 80 g 1     Sig: Apply topically 2 (two) times daily. There is no refill protocol information for this order       traMADol 50 MG Oral Tab 30 tablet 0     Sig: Take 1 tablet (50 mg total) by mouth every 6 (six) hours as needed.        There is no refill protocol information for this order         Future Appointments         Provider Department Appt Notes    In 4 weeks María Yao PA-C [address]            Recent Outpatient Visits              2 months ago Wellness examination    [address]    Nurse Only    3 months ago Viral syndrome    [address], L[address], MD    Telemedicine    3 months ago Sore throat    [facility], [facility] Ary Rojas MD    Office Visit    10 months ago Persistent adjustment disorder with mixed anxiety and depressed mood    [facility] [place], [place]    Telemedicine    11 months ago Insomnia secondary to depression with anxiety    [facility] [place], [place]    Telemedicine

## 2023-05-24 NOTE — TELEPHONE ENCOUNTER
Please review refill protocol failed/ no protocol  Requested Prescriptions   Pending Prescriptions Disp Refills    TRIAMCINOLONE 0.1 % External Cream [Pharmacy Med Name: TRIAMCINOLONE 0.1% CREAM 80GM] 80 g 1     Sig: APPLY TOPICALLY TO THE AFFECTED AREA TWICE DAILY       There is no refill protocol information for this order

## 2023-05-31 NOTE — TELEPHONE ENCOUNTER
See TE from 5/30/2023 under Dr. Christofer Martínez routed him a message requesting the order in that encounter.

## 2023-05-31 NOTE — TELEPHONE ENCOUNTER
Patient contacted. She already scheduled the Ultrasound and accepted below appointment with Dr. Winston Barrow. Your Appointments    Thursday June 08, 2023  2:00 PM  Follow Up Visit with Archana Peres MD  [address], [address] [facility],[address] Floor, [facility]) [address] [facility],[address] Floors  [phone]      Wednesday June 21, 2023  9:00 AM  ULTRASOUND OF THE ABDOMEN EXAM with [facility] US [address] [place] [address] ([address]) [address]  [phone]   Please arrive 15 minutes prior to the scheduled appointment time. Fasting instructions for adults 25years of age and older:    Please do not eat or drink anything for 6 hours prior to your exam. If you need to take oral medication in the morning, please take it with plain water only. Not following instructions may compromise your exam and you may need to reschedule.

## 2023-05-31 NOTE — TELEPHONE ENCOUNTER
Pt states that she called to schedule ultrasound and she was told there is no order in system. Please call.

## 2023-05-31 NOTE — TELEPHONE ENCOUNTER
Radha Acharya for [employer] scheduling called and stated patient does not have an order to schedule ultrasound.

## 2023-05-31 NOTE — TELEPHONE ENCOUNTER
Dr. Ernesto Hogan    Patient called to schedule the US but there is no order yet, can you please place it?     Thank you

## 2023-06-08 ENCOUNTER — PATIENT MESSAGE (OUTPATIENT)
Facility: CLINIC | Age: 58
End: 2023-06-08

## 2023-06-08 NOTE — TELEPHONE ENCOUNTER
From: Millie John  To: Stacy Rey MD  Sent: 6/8/2023 4:56 PM CDT  Subject: Question regarding US GALL BLADDER (CPT=76705)    Good evening,    I received my Ultrasound Test Results, I would like for the Nurse to give me a call and explain it to me. Thanks.

## 2023-06-08 NOTE — TELEPHONE ENCOUNTER
Message received from Pranay ONEIL that patient needs stat US. Per Epic auto authorized so she can schedule. I called the patient and did a conference call with central scheduling physician line to get her scheduled since STAT. Spoke to [facility].     Patient was in the lab getting blood drawn so she was able to get her set up for her ultrasound to be done today at 3:30pm.

## 2023-06-08 NOTE — PATIENT INSTRUCTIONS
Right upper abdominal pain  - call to set up ultrasound, order placed stat  - lab work today  - start on dicyclomine  - call surgery [phone]

## 2023-06-09 NOTE — TELEPHONE ENCOUNTER
Patient was contacted and labs reviewed. Overall normal liver tests, normal white cell count and hemoglobin. Ultrasound was normal.    Plan-patient will continue on a bland diet, trial of dicyclomine before meals.  -See surgeon regarding potential for biliary dyskinesia chronic gallbladder issues.

## 2023-06-13 ENCOUNTER — OFFICE VISIT (OUTPATIENT)
Dept: SURGERY | Facility: CLINIC | Age: 58
End: 2023-06-13

## 2023-06-13 VITALS — BODY MASS INDEX: 34.16 KG/M2 | HEIGHT: 65 IN | WEIGHT: 205 LBS

## 2023-06-13 DIAGNOSIS — K80.50 BILIARY COLIC: Primary | ICD-10-CM

## 2023-06-13 PROCEDURE — 99203 OFFICE O/P NEW LOW 30 MIN: CPT | Performed by: SURGERY

## 2023-06-13 PROCEDURE — 3008F BODY MASS INDEX DOCD: CPT | Performed by: SURGERY

## 2023-06-14 ENCOUNTER — TELEPHONE (OUTPATIENT)
Dept: SURGERY | Facility: CLINIC | Age: 58
End: 2023-06-14

## 2023-06-14 NOTE — TELEPHONE ENCOUNTER
Magda Harkins Patient  Member ID  [id number]    Date of Birth  1965-07-22    Gender  Female    Transaction Type  Outpatient Authorization    [address]    Payer  [facility] logo  Transaction ID: [id number] ID: [id number] Date: 2023-06-14  No Authorization Required  Service Type  2 - Surgical    Place of Service  [address]    Service From - To Date  NA    Procedure Code 1  06648 - LAPAROSCOPIC CHOLECYSTECTOMY    Quantity  1 Units    Procedure From - To Date  2023-06-16 - 2023-09-16    Status  NO [address] REQUIRED    Message  Procedure codes are supported for preauthorization requirement only and are not used for benefit determination    Vendor Name  [address] [place] [address] Status  In Network

## 2023-06-16 ENCOUNTER — ANESTHESIA (OUTPATIENT)
Dept: SURGERY | Facility: HOSPITAL | Age: 58
End: 2023-06-16
Payer: COMMERCIAL

## 2023-06-16 ENCOUNTER — HOSPITAL ENCOUNTER (OUTPATIENT)
Facility: HOSPITAL | Age: 58
Setting detail: OBSERVATION
Discharge: HOME | End: 2023-06-17
Attending: SURGERY | Admitting: SURGERY
Payer: COMMERCIAL

## 2023-06-16 ENCOUNTER — ANESTHESIA EVENT (OUTPATIENT)
Dept: SURGERY | Facility: HOSPITAL | Age: 58
End: 2023-06-16
Payer: COMMERCIAL

## 2023-06-16 DIAGNOSIS — K80.50 BILIARY COLIC: ICD-10-CM

## 2023-06-16 PROCEDURE — 0FT44ZZ RESECTION OF GALLBLADDER, PERCUTANEOUS ENDOSCOPIC APPROACH: ICD-10-PCS | Performed by: SURGERY

## 2023-06-16 PROCEDURE — 47562 LAPAROSCOPIC CHOLECYSTECTOMY: CPT | Performed by: SURGERY

## 2023-06-16 PROCEDURE — 0DNU4ZZ RELEASE OMENTUM, PERCUTANEOUS ENDOSCOPIC APPROACH: ICD-10-PCS | Performed by: SURGERY

## 2023-06-16 PROCEDURE — 0FN04ZZ RELEASE LIVER, PERCUTANEOUS ENDOSCOPIC APPROACH: ICD-10-PCS | Performed by: SURGERY

## 2023-06-16 PROCEDURE — 99232 SBSQ HOSP IP/OBS MODERATE 35: CPT | Performed by: HOSPITALIST

## 2023-06-16 RX ORDER — MAGNESIUM HYDROXIDE 1200 MG/15ML
LIQUID ORAL CONTINUOUS PRN
Status: COMPLETED | OUTPATIENT
Start: 2023-06-16 | End: 2023-06-16

## 2023-06-16 RX ORDER — POLYETHYLENE GLYCOL 3350 17 G/17G
17 POWDER, FOR SOLUTION ORAL DAILY PRN
Status: DISCONTINUED | OUTPATIENT
Start: 2023-06-16 | End: 2023-06-17

## 2023-06-16 RX ORDER — ZOLPIDEM TARTRATE 5 MG/1
5 TABLET ORAL NIGHTLY PRN
Status: DISCONTINUED | OUTPATIENT
Start: 2023-06-16 | End: 2023-06-17

## 2023-06-16 RX ORDER — ONDANSETRON 2 MG/ML
4 INJECTION INTRAMUSCULAR; INTRAVENOUS EVERY 6 HOURS PRN
Status: DISCONTINUED | OUTPATIENT
Start: 2023-06-16 | End: 2023-06-17

## 2023-06-16 RX ORDER — ATORVASTATIN CALCIUM 40 MG/1
40 TABLET, FILM COATED ORAL NIGHTLY
Status: DISCONTINUED | OUTPATIENT
Start: 2023-06-16 | End: 2023-06-17

## 2023-06-16 RX ORDER — SODIUM CHLORIDE, SODIUM LACTATE, POTASSIUM CHLORIDE, CALCIUM CHLORIDE 600; 310; 30; 20 MG/100ML; MG/100ML; MG/100ML; MG/100ML
INJECTION, SOLUTION INTRAVENOUS CONTINUOUS
Status: DISCONTINUED | OUTPATIENT
Start: 2023-06-16 | End: 2023-06-17

## 2023-06-16 RX ORDER — ROCURONIUM BROMIDE 10 MG/ML
INJECTION, SOLUTION INTRAVENOUS AS NEEDED
Status: DISCONTINUED | OUTPATIENT
Start: 2023-06-16 | End: 2023-06-16 | Stop reason: SURG

## 2023-06-16 RX ORDER — HYDROCODONE BITARTRATE AND ACETAMINOPHEN 5; 325 MG/1; MG/1
1 TABLET ORAL EVERY 6 HOURS PRN
Qty: 30 TABLET | Refills: 0 | Status: SHIPPED | OUTPATIENT
Start: 2023-06-16

## 2023-06-16 RX ORDER — FAMOTIDINE 20 MG/1
20 TABLET, FILM COATED ORAL ONCE
Status: DISCONTINUED | OUTPATIENT
Start: 2023-06-16 | End: 2023-06-16 | Stop reason: HOSPADM

## 2023-06-16 RX ORDER — ONDANSETRON 4 MG/1
4 TABLET, FILM COATED ORAL EVERY 8 HOURS PRN
Qty: 15 TABLET | Refills: 0 | Status: SHIPPED | OUTPATIENT
Start: 2023-06-16

## 2023-06-16 RX ORDER — HYDROMORPHONE HYDROCHLORIDE 1 MG/ML
0.4 INJECTION, SOLUTION INTRAMUSCULAR; INTRAVENOUS; SUBCUTANEOUS EVERY 5 MIN PRN
Status: DISCONTINUED | OUTPATIENT
Start: 2023-06-16 | End: 2023-06-16 | Stop reason: HOSPADM

## 2023-06-16 RX ORDER — BISACODYL 10 MG
10 SUPPOSITORY, RECTAL RECTAL
Status: DISCONTINUED | OUTPATIENT
Start: 2023-06-16 | End: 2023-06-17

## 2023-06-16 RX ORDER — HYDROCHLOROTHIAZIDE 12.5 MG/1
12.5 TABLET ORAL DAILY
COMMUNITY
Start: 2023-05-06

## 2023-06-16 RX ORDER — HYDROMORPHONE HYDROCHLORIDE 1 MG/ML
0.8 INJECTION, SOLUTION INTRAMUSCULAR; INTRAVENOUS; SUBCUTANEOUS EVERY 2 HOUR PRN
Status: DISCONTINUED | OUTPATIENT
Start: 2023-06-16 | End: 2023-06-17

## 2023-06-16 RX ORDER — AMLODIPINE BESYLATE 10 MG/1
10 TABLET ORAL DAILY
Status: DISCONTINUED | OUTPATIENT
Start: 2023-06-17 | End: 2023-06-17

## 2023-06-16 RX ORDER — LIDOCAINE HYDROCHLORIDE 10 MG/ML
INJECTION, SOLUTION EPIDURAL; INFILTRATION; INTRACAUDAL; PERINEURAL AS NEEDED
Status: DISCONTINUED | OUTPATIENT
Start: 2023-06-16 | End: 2023-06-16 | Stop reason: SURG

## 2023-06-16 RX ORDER — METOCLOPRAMIDE 10 MG/1
10 TABLET ORAL ONCE
Status: COMPLETED | OUTPATIENT
Start: 2023-06-16 | End: 2023-06-16

## 2023-06-16 RX ORDER — ONDANSETRON 2 MG/ML
INJECTION INTRAMUSCULAR; INTRAVENOUS AS NEEDED
Status: DISCONTINUED | OUTPATIENT
Start: 2023-06-16 | End: 2023-06-16 | Stop reason: SURG

## 2023-06-16 RX ORDER — OXYCODONE HYDROCHLORIDE 5 MG/1
5 TABLET ORAL EVERY 4 HOURS PRN
Status: DISCONTINUED | OUTPATIENT
Start: 2023-06-16 | End: 2023-06-17

## 2023-06-16 RX ORDER — MORPHINE SULFATE 4 MG/ML
2 INJECTION, SOLUTION INTRAMUSCULAR; INTRAVENOUS EVERY 10 MIN PRN
Status: DISCONTINUED | OUTPATIENT
Start: 2023-06-16 | End: 2023-06-16

## 2023-06-16 RX ORDER — OXYCODONE HYDROCHLORIDE 5 MG/1
10 TABLET ORAL EVERY 4 HOURS PRN
Status: DISCONTINUED | OUTPATIENT
Start: 2023-06-16 | End: 2023-06-17

## 2023-06-16 RX ORDER — HYDROMORPHONE HYDROCHLORIDE 1 MG/ML
0.2 INJECTION, SOLUTION INTRAMUSCULAR; INTRAVENOUS; SUBCUTANEOUS EVERY 5 MIN PRN
Status: DISCONTINUED | OUTPATIENT
Start: 2023-06-16 | End: 2023-06-16 | Stop reason: HOSPADM

## 2023-06-16 RX ORDER — CEFAZOLIN SODIUM/WATER 2 G/20 ML
2 SYRINGE (ML) INTRAVENOUS ONCE
Status: COMPLETED | OUTPATIENT
Start: 2023-06-16 | End: 2023-06-16

## 2023-06-16 RX ORDER — ACETAMINOPHEN 500 MG
1000 TABLET ORAL ONCE
Status: COMPLETED | OUTPATIENT
Start: 2023-06-16 | End: 2023-06-16

## 2023-06-16 RX ORDER — NALOXONE HYDROCHLORIDE 0.4 MG/ML
80 INJECTION, SOLUTION INTRAMUSCULAR; INTRAVENOUS; SUBCUTANEOUS AS NEEDED
Status: DISCONTINUED | OUTPATIENT
Start: 2023-06-16 | End: 2023-06-16 | Stop reason: HOSPADM

## 2023-06-16 RX ORDER — HYDROCHLOROTHIAZIDE 12.5 MG/1
12.5 TABLET ORAL DAILY
Status: DISCONTINUED | OUTPATIENT
Start: 2023-06-17 | End: 2023-06-17

## 2023-06-16 RX ORDER — DEXAMETHASONE SODIUM PHOSPHATE 4 MG/ML
VIAL (ML) INJECTION AS NEEDED
Status: DISCONTINUED | OUTPATIENT
Start: 2023-06-16 | End: 2023-06-16 | Stop reason: SURG

## 2023-06-16 RX ORDER — BUPIVACAINE HYDROCHLORIDE 5 MG/ML
INJECTION, SOLUTION EPIDURAL; INTRACAUDAL AS NEEDED
Status: DISCONTINUED | OUTPATIENT
Start: 2023-06-16 | End: 2023-06-16 | Stop reason: HOSPADM

## 2023-06-16 RX ORDER — HYDROMORPHONE HYDROCHLORIDE 1 MG/ML
0.4 INJECTION, SOLUTION INTRAMUSCULAR; INTRAVENOUS; SUBCUTANEOUS EVERY 2 HOUR PRN
Status: DISCONTINUED | OUTPATIENT
Start: 2023-06-16 | End: 2023-06-17

## 2023-06-16 RX ORDER — SENNOSIDES 8.6 MG
17.2 TABLET ORAL NIGHTLY PRN
Status: DISCONTINUED | OUTPATIENT
Start: 2023-06-16 | End: 2023-06-17

## 2023-06-16 RX ORDER — ENEMA 19; 7 G/133ML; G/133ML
1 ENEMA RECTAL ONCE AS NEEDED
Status: DISCONTINUED | OUTPATIENT
Start: 2023-06-16 | End: 2023-06-17

## 2023-06-16 RX ORDER — HYDROMORPHONE HYDROCHLORIDE 1 MG/ML
0.6 INJECTION, SOLUTION INTRAMUSCULAR; INTRAVENOUS; SUBCUTANEOUS EVERY 5 MIN PRN
Status: DISCONTINUED | OUTPATIENT
Start: 2023-06-16 | End: 2023-06-16 | Stop reason: HOSPADM

## 2023-06-16 RX ORDER — KETOROLAC TROMETHAMINE 30 MG/ML
30 INJECTION, SOLUTION INTRAMUSCULAR; INTRAVENOUS ONCE
Status: DISCONTINUED | OUTPATIENT
Start: 2023-06-16 | End: 2023-06-16

## 2023-06-16 RX ORDER — MORPHINE SULFATE 10 MG/ML
6 INJECTION, SOLUTION INTRAMUSCULAR; INTRAVENOUS EVERY 10 MIN PRN
Status: DISCONTINUED | OUTPATIENT
Start: 2023-06-16 | End: 2023-06-16

## 2023-06-16 RX ORDER — PANTOPRAZOLE SODIUM 40 MG/1
40 TABLET, DELAYED RELEASE ORAL
Status: DISCONTINUED | OUTPATIENT
Start: 2023-06-17 | End: 2023-06-17

## 2023-06-16 RX ORDER — PROCHLORPERAZINE EDISYLATE 5 MG/ML
5 INJECTION INTRAMUSCULAR; INTRAVENOUS EVERY 8 HOURS PRN
Status: DISCONTINUED | OUTPATIENT
Start: 2023-06-16 | End: 2023-06-17

## 2023-06-16 RX ORDER — PROCHLORPERAZINE EDISYLATE 5 MG/ML
10 INJECTION INTRAMUSCULAR; INTRAVENOUS ONCE
Status: COMPLETED | OUTPATIENT
Start: 2023-06-16 | End: 2023-06-16

## 2023-06-16 RX ORDER — POLYETHYLENE GLYCOL 3350 17 G/17G
17 POWDER, FOR SOLUTION ORAL DAILY
Qty: 14 PACKET | Refills: 0 | Status: SHIPPED | OUTPATIENT
Start: 2023-06-16 | End: 2023-06-30

## 2023-06-16 RX ORDER — MORPHINE SULFATE 4 MG/ML
4 INJECTION, SOLUTION INTRAMUSCULAR; INTRAVENOUS EVERY 10 MIN PRN
Status: DISCONTINUED | OUTPATIENT
Start: 2023-06-16 | End: 2023-06-16

## 2023-06-16 RX ADMIN — DEXAMETHASONE SODIUM PHOSPHATE 4 MG: 4 MG/ML VIAL (ML) INJECTION at 10:41:00

## 2023-06-16 RX ADMIN — ROCURONIUM BROMIDE 50 MG: 10 INJECTION, SOLUTION INTRAVENOUS at 10:41:00

## 2023-06-16 RX ADMIN — CEFAZOLIN SODIUM/WATER 2 G: 2 G/20 ML SYRINGE (ML) INTRAVENOUS at 10:47:00

## 2023-06-16 RX ADMIN — ONDANSETRON 4 MG: 2 INJECTION INTRAMUSCULAR; INTRAVENOUS at 10:41:00

## 2023-06-16 RX ADMIN — LIDOCAINE HYDROCHLORIDE 50 MG: 10 INJECTION, SOLUTION EPIDURAL; INFILTRATION; INTRACAUDAL; PERINEURAL at 10:41:00

## 2023-06-16 NOTE — INTERVAL H&P NOTE
Pre-op Diagnosis: Biliary colic [M50.08]    The above referenced H&P was reviewed by Cristobal Gleason MD on 6/16/2023, the patient was examined and no significant changes have occurred in the patient's condition since the H&P was performed. I discussed with the patient and/or legal representative the potential benefits, risks and side effects of this procedure; the likelihood of the patient achieving goals; and potential problems that might occur during recuperation. I discussed reasonable alternatives to the procedure, including risks, benefits and side effects related to the alternatives and risks related to not receiving this procedure. We will proceed with procedure as planned.

## 2023-06-16 NOTE — ANESTHESIA POSTPROCEDURE EVALUATION
Patient: Jeremy Geiger    Procedure Summary     Date: 06/16/23 Room / Location: [facility] OR [address] [place] [address] OR    Anesthesia Start: 9808 Anesthesia Stop: 9722    Procedure: Laparoscopic cholecystectomy, laparascopic lysis of adhesions (Abdomen) Diagnosis:       Biliary colic      (Biliary colic [X40.93]; intraabdominal adhesions)    Surgeons: Tamara Harada, MD Anesthesiologist: Florence Tarango MD    Anesthesia Type: general ASA Status: 2          Anesthesia Type: general    Vitals Value Taken Time   /82 06/16/23 1139   Temp  06/16/23 1139   Pulse 121 06/16/23 1139   Resp 14 06/16/23 1139   SpO2 97 06/16/23 1139       [facility] AN Post Evaluation:   Patient Evaluated in PACU  Patient Participation: complete - patient participated  Level of Consciousness: awake  Pain Management: adequate  Airway Patency:patent  Yes    Cardiovascular Status: acceptable  Respiratory Status: acceptable  Postoperative Hydration acceptable      Sonia Kapoor MD  6/16/2023 11:39 AM

## 2023-06-16 NOTE — ANESTHESIA PROCEDURE NOTES
Airway  Date/Time: 6/16/2023 10:44 AM  Urgency: Elective      General Information and Staff    Patient location during procedure: OR  Anesthesiologist: Negro López MD  Performed: anesthesiologist   Performed by: Negro López MD  Authorized by: Negro López MD      Indications and Patient Condition  Indications for airway management: anesthesia  Sedation level: deep  Preoxygenated: yes  Patient position: sniffing  Mask difficulty assessment: 1 - vent by mask    Final Airway Details  Final airway type: endotracheal airway      Successful airway: ETT  Cuffed: yes   Successful intubation technique: Video laryngoscopy  Facilitating devices/methods: intubating stylet  Endotracheal tube insertion site: oral  Blade: GlideScope  Blade size: #3  ETT size (mm): 7.0    Cormack-Lehane Classification: grade I - full view of glottis  Placement verified by: capnometry   Measured from: teeth  Number of attempts at approach: 1

## 2023-06-16 NOTE — PROGRESS NOTES
Patient having severe nausea upon arrival to phase II with no relief from zofran given in PACU. Non pharm items offered with no relief, compazine given which helped with nausea. Dilaudid given prn for pain. Pt sister and  state they will not be able to be at home with patient overnight and are concerned leaving her while she's feeling like this after surgery. Dr Chelly Rodríguez made aware they are wanting her to be admitted overnight for obs. Report given to [facility].

## 2023-06-16 NOTE — OPERATIVE REPORT
[facility]     Operative Report    Patient Name:  Radha Martin  MR:  [id number]  :  1965  DOS:  23    Preop Dx:  Biliary colic [I79.59]; intraabdominal adhesions  Postop Dx:  Biliary colic [H44.71]; intraabdominal adhesions  Procedure:    1. Laparoscopic cholecystectomy  2. Laparoscopic lysis of adhesions  Surgeon:  Los Aguilar MD  Surgical Assistant.: Nia Gonzales CSA, Kellen Quijano MD  EBL: 5 ml  Complication:  None    INDICATION:  Pt is a 62year old female who with Biliary colic [L88.26] who is scheduled for a Laparoscopic cholecystectomy, laparascopic lysis of adhesions. CONSENT:  An informed consent discussion was held with the patient regarding the nature of Biliary colic [R71.10], the treatment options and the details of the procedure. The risks including but not limited to bleeding, wound infection, intra-abdominal infection, injury to the liver, colon, small intestine, pancreas, stomach, common bile duct, incomplete resection, cystic duct stump leak, retained stone and incisional hernia were discussed. The patient expressed understanding and want to proceed with the planned procedure. TECHNIQUE:  The patient was taken to the OR and placed in supine position. General anesthesia was established and the abdomen was prepped in standard fashion. Pneumoperitoneum was obtained using open technique through a supra-umbilical incision. A 12-mm trocar was inserted under direct visualization and no injury occurred. Examination of the abdomen showed adhesions of the liver to the anterior abdominal wall consistent with Fzsv-Xuyl-Ilovvn findings. The gallbladder wall appeared normal consistent with Biliary colic [X02.76]. A 5-mm trocar was inserted in the epigastric area. Lysis of adhesions was performed sharply using the Enseal without energy. The additional two 5-mm trocars were placed in the right abdomen after those areas were freed of adhesions.   The omental adhesions to the inferior edge of the liver was likewise divided. The patient was placed in reverse Trendelenburg position. The fundus was grasped and retracted cephalad. The infundibulum was grasped and retracted inferior, anterior, and to the right. The peritoneum along the medial and lateral aspect of the gallbladder/liver edge were incised using hook cautery. The lower 1/3 of the gallbladder was dissected from the liver. Two structures, identified as the cystic duct and artery, are seen entering the infundibulum, thus obtaining the so called \"critical view of safety\". The cystic duct and artery were clipped and divided. The gallbladder was detached from the liver using hook cautery and delivered from the abdomen using an endocatch bag. The abdominal cavity was irrigated with saline and found to be hemostatic. The trocars were removed under direct visualization and no port site bleeding was seen. The supra-umbilical fascia was closed using 0 vicryl. The skin incisions were closed using 4-0 vicryl. Sterile dressings were applied. All instrument and sponge counts were correct. I was present during the critical portions of the procedure.     Allegra Cabral MD

## 2023-06-17 VITALS
BODY MASS INDEX: 34.16 KG/M2 | TEMPERATURE: 99 F | RESPIRATION RATE: 18 BRPM | DIASTOLIC BLOOD PRESSURE: 80 MMHG | HEIGHT: 65 IN | OXYGEN SATURATION: 93 % | SYSTOLIC BLOOD PRESSURE: 123 MMHG | HEART RATE: 105 BPM | WEIGHT: 205 LBS

## 2023-06-17 PROCEDURE — 99239 HOSP IP/OBS DSCHRG MGMT >30: CPT | Performed by: HOSPITALIST

## 2023-06-17 NOTE — PLAN OF CARE
Patient A/O X 4  on room air. Voiding freely and passing gas. Patient has four lap sites to the Abd with dermabond glue and an abdominal binder. Patient stayed over night for observation, possible discharge today. Safety measures in place will continue current monitoring.    Problem: Patient Centered Care  Goal: Patient preferences are identified and integrated in the patient's plan of care  Description: Interventions:  - What would you like us to know as we care for you?   - Provide timely, complete, and accurate information to patient/family  - Incorporate patient and family knowledge, values, beliefs, and cultural backgrounds into the planning and delivery of care  - Encourage patient/family to participate in care and decision-making at the level they choose  - Honor patient and family perspectives and choices  Outcome: Progressing     Problem: Patient/Family Goals  Goal: Patient/Family Long Term Goal  Description: Patient's Long Term Goal:     Interventions:  -   - See additional Care Plan goals for specific interventions  Outcome: Progressing  Goal: Patient/Family Short Term Goal  Description: Patient's Short Term Goal:     Interventions:   -   - See additional Care Plan goals for specific interventions  Outcome: Progressing

## 2023-06-17 NOTE — PLAN OF CARE
A/O x 4. Tolerating diet. Passing flatus. Denies nausea. Pain managed with oxycodone. Walking, up ad juan. Ok to discharge home today. Discharge instructions explained to patient. All medications reviewed including which medications to start, continue, or stop taking. All follow up appointments reviewed. All discharge eduation explained to pt. Patient verbalized understanding, all questions answered. All belongings sent with patient.

## 2023-06-19 ENCOUNTER — PATIENT OUTREACH (OUTPATIENT)
Dept: CASE MANAGEMENT | Age: 58
End: 2023-06-19

## 2023-06-22 ENCOUNTER — TELEPHONE (OUTPATIENT)
Dept: SURGERY | Facility: CLINIC | Age: 58
End: 2023-06-22

## 2023-06-22 NOTE — TELEPHONE ENCOUNTER
Patient had surgery with Dr Rafael Way on 6/16/2023  Was told to schedule post op for June 29 no appointments available. Please advise

## 2023-06-27 ENCOUNTER — OFFICE VISIT (OUTPATIENT)
Dept: SURGERY | Facility: CLINIC | Age: 58
End: 2023-06-27

## 2023-06-27 VITALS — BODY MASS INDEX: 34 KG/M2 | WEIGHT: 205 LBS

## 2023-06-27 DIAGNOSIS — Z09 POSTOPERATIVE EXAMINATION: Primary | ICD-10-CM

## 2023-06-27 PROCEDURE — 99024 POSTOP FOLLOW-UP VISIT: CPT | Performed by: SURGERY

## 2023-06-27 RX ORDER — HYDROCODONE BITARTRATE AND ACETAMINOPHEN 5; 325 MG/1; MG/1
1 TABLET ORAL EVERY 6 HOURS PRN
Qty: 15 TABLET | Refills: 0 | Status: SHIPPED | OUTPATIENT
Start: 2023-06-27

## 2023-07-20 RX ORDER — HYDROCODONE BITARTRATE AND ACETAMINOPHEN 5; 325 MG/1; MG/1
1 TABLET ORAL EVERY 6 HOURS PRN
Qty: 30 TABLET | Refills: 0 | OUTPATIENT
Start: 2023-07-20

## 2023-07-28 DIAGNOSIS — J30.9 ALLERGIC RHINITIS, UNSPECIFIED SEASONALITY, UNSPECIFIED TRIGGER: ICD-10-CM

## 2023-07-28 DIAGNOSIS — G43.001 MIGRAINE WITHOUT AURA AND WITH STATUS MIGRAINOSUS, NOT INTRACTABLE: ICD-10-CM

## 2023-07-28 RX ORDER — TRAMADOL HYDROCHLORIDE 50 MG/1
50 TABLET ORAL EVERY 6 HOURS PRN
Qty: 30 TABLET | Refills: 0 | Status: SHIPPED | OUTPATIENT
Start: 2023-07-28

## 2023-07-28 RX ORDER — CETIRIZINE HYDROCHLORIDE 10 MG/1
10 TABLET ORAL DAILY
Qty: 90 TABLET | Refills: 3 | Status: SHIPPED | OUTPATIENT
Start: 2023-07-28

## 2023-07-28 NOTE — TELEPHONE ENCOUNTER
Patient calling, confirmed name and . She complains of headaches since 2018 that she states she has discussed with Dr. David Luke. She requests a Saturday appointment to discuss further. She believes that it is amlodipine that is causing her headaches. She also requests a refill for Tramadol which she takes for her headaches. Refill for cetirizine also sent through protocol. Patient has PPO insurance therefore advised her to review neurologists on [employer]. Assisted her to schedule a physical:  Future Appointments   Date Time Provider Eneida Davis   2023  1:40 PM [facility]   2023 12:40 PM DO INGRID Naylor       Routed to on-call provider for Tramadol script.

## 2023-08-07 ENCOUNTER — TELEPHONE (OUTPATIENT)
Facility: CLINIC | Age: 58
End: 2023-08-07

## 2023-08-07 RX ORDER — LANSOPRAZOLE 30 MG/1
30 CAPSULE, DELAYED RELEASE ORAL
Qty: 90 CAPSULE | Refills: 0 | Status: SHIPPED | OUTPATIENT
Start: 2023-08-07

## 2023-08-07 NOTE — TELEPHONE ENCOUNTER
lansoprazole 30 MG Oral Capsule Delayed Release, Take 1 capsule (30 mg total) by mouth every morning before breakfast. Take 1 capsule every day before a meal, Disp: 90 capsule, Rfl: 0

## 2023-08-07 NOTE — TELEPHONE ENCOUNTER
PPI (Proton Pump Inhibitors): Nexium (Esomeprazole), Protonix (Pantoprazole), Dexilant (Dexlansoprazole), Prevacid (Lansoprazole), Aciphex (Rabeprazole), Omperazole      Protocol Criteria  Review last office visit note(s), plan of care, for any change     Appointment scheduled within the past 12 months or in the next 3 months or had a procedure (if applicable) and is up to date with their recall     Date of Last Office Visit: 6/8/2023    Date of next scheduled appointment: n/a    Date of last procedure (if applicable):8/28/2020    Date of recall (if applicable):   7 years due 8/28/2027    If used for Kenneth's patient is up to date with appointment     Date of Last Office Visit   Per recent OV note from Dr. Maria Elena Lobato: \"Continue on lansoprazole. \"

## 2023-08-16 DIAGNOSIS — F51.05 INSOMNIA SECONDARY TO DEPRESSION WITH ANXIETY: ICD-10-CM

## 2023-08-16 DIAGNOSIS — F41.8 INSOMNIA SECONDARY TO DEPRESSION WITH ANXIETY: ICD-10-CM

## 2023-08-17 RX ORDER — ZOLPIDEM TARTRATE 12.5 MG/1
12.5 TABLET, FILM COATED, EXTENDED RELEASE ORAL NIGHTLY PRN
Qty: 30 TABLET | Refills: 0 | Status: SHIPPED | OUTPATIENT
Start: 2023-08-17

## 2023-08-17 NOTE — TELEPHONE ENCOUNTER
Please review. Protocol failed or has no protocol. Patient Comment: Having A Hard Time Sleeping, I Have An Appointment on 8/26/22 With Dr. Gogo Green. Thanks     Requested Prescriptions   Pending Prescriptions Disp Refills    Zolpidem Tartrate ER 12.5 MG Oral Tab CR 30 tablet 0     Sig: Take 1 tablet (12.5 mg total) by mouth nightly as needed for Sleep.        There is no refill protocol information for this order          Recent Outpatient Visits              1 month ago Postoperative examination    Phoenix Baron MD    Office Visit    2 months ago Biliary colic    Phoenix Baron MD    Office Visit    2 months ago RUQ pain    [employer], [address], [place] Raquel Santos MD    Office Visit    5 months ago Wellness examination    [address]    Nurse Only    6 months ago Viral syndrome    [address] MD    Telemedicine            Future Appointments         Provider Department Appt Notes    In 1 week DO Emily Sanderson, [facility] headaches

## 2023-09-09 ENCOUNTER — HOSPITAL ENCOUNTER (OUTPATIENT)
Dept: MAMMOGRAPHY | Age: 58
Discharge: HOME | End: 2023-09-09
Attending: FAMILY MEDICINE
Payer: MEDICAID

## 2023-09-09 DIAGNOSIS — Z12.31 ENCOUNTER FOR SCREENING MAMMOGRAM FOR MALIGNANT NEOPLASM OF BREAST: ICD-10-CM

## 2023-09-09 PROCEDURE — 77067 SCR MAMMO BI INCL CAD: CPT | Performed by: FAMILY MEDICINE

## 2023-09-09 PROCEDURE — 77063 BREAST TOMOSYNTHESIS BI: CPT | Performed by: FAMILY MEDICINE

## 2023-09-11 ENCOUNTER — PATIENT MESSAGE (OUTPATIENT)
Dept: FAMILY MEDICINE CLINIC | Facility: CLINIC | Age: 58
End: 2023-09-11

## 2023-11-04 NOTE — TELEPHONE ENCOUNTER
Please review. Protocol failed / No protocol.    Requested Prescriptions   Pending Prescriptions Disp Refills    ATORVASTATIN 40 MG Oral Tab [Pharmacy Med Name: ATORVASTATIN 40MG TABLETS] 90 tablet 1     Sig: TAKE 1 TABLET(40 MG) BY MOUTH EVERY NIGHT       Cholesterol Medication Protocol Failed - 11/3/2023  5:59 AM        Failed - LDL in past 12 months        Failed - Last LDL < 130     Lab Results   Component Value Date     12/16/2021             Passed - ALT in past 12 months        Passed - Last ALT < 80     Lab Results   Component Value Date    ALT 33 06/08/2023             Passed - In person appointment or virtual visit in the past 12 mos or appointment in next 3 mos     Recent Outpatient Visits              4 months ago Postoperative examination    Theo Leslie MD    Office Visit    4 months ago Biliary colic    Theo Leslie MD    Office Visit    4 months ago RUQ pain    [address] [facility], [place] Hugo Capps MD    Office Visit    7 months ago Wellness examination    [address]    Nurse Only    8 months ago Viral syndrome    [address] [facility], [address], [address] Visits              4 months ago Postoperative examination    Theo Leslie MD    Office Visit    4 months ago Biliary colic    Theo Leslie MD    Office Visit    4 months ago RUQ pain    [address] [facility], Victor Hugo Capps MD    Office Visit    7 months ago Wellness examination    [address]    Nurse Only    8 months ago Viral syndrome    [address] [facility], [facility] Jurgen Wills MD    Telemedicine

## 2023-11-05 RX ORDER — ATORVASTATIN CALCIUM 40 MG/1
40 TABLET, FILM COATED ORAL NIGHTLY
Qty: 90 TABLET | Refills: 1 | Status: SHIPPED | OUTPATIENT
Start: 2023-11-05

## 2023-11-09 DIAGNOSIS — G43.001 MIGRAINE WITHOUT AURA AND WITH STATUS MIGRAINOSUS, NOT INTRACTABLE: ICD-10-CM

## 2023-11-09 RX ORDER — TRAMADOL HYDROCHLORIDE 50 MG/1
50 TABLET ORAL EVERY 6 HOURS PRN
Qty: 30 TABLET | Refills: 0 | Status: SHIPPED | OUTPATIENT
Start: 2023-11-09

## 2023-11-09 NOTE — TELEPHONE ENCOUNTER
Dr. Domi Ngo - last visit was with you, and Tramadol for Migraines was noted on the visit notes. Please Review. Protocol Failed or has no protocol. February 2023:   \"Migraine without aura and with status migrainosus, not intractable-takes tramadol and Excedrin for migraine relief, requesting refill for tramadol today. Reviewed instructions and side effects \"          Requested Prescriptions   Pending Prescriptions Disp Refills    traMADol 50 MG Oral Tab 30 tablet 0     Sig: Take 1 tablet (50 mg total) by mouth every 6 (six) hours as needed.        There is no refill protocol information for this order        Recent Outpatient Visits              4 months ago Postoperative examination    Tri Rodriguez MD    Office Visit    4 months ago Biliary colic    Tri Rodriguez MD    Office Visit    5 months ago RUQ pain    [address] [place], [place] [address]andi Carter MD    Office Visit    8 months ago Wellness examination    [address]    Nurse Only    9 months ago Viral syndrome    [address] [facility] Lesli Banuelos MD    Telemedicine

## 2023-11-20 RX ORDER — HYDROCHLOROTHIAZIDE 12.5 MG/1
12.5 TABLET ORAL DAILY
Qty: 90 TABLET | Refills: 0 | Status: SHIPPED | OUTPATIENT
Start: 2023-11-20

## 2023-11-20 RX ORDER — HYDROCHLOROTHIAZIDE 12.5 MG/1
12.5 TABLET ORAL DAILY
Refills: 0 | OUTPATIENT
Start: 2023-11-20

## 2023-11-24 RX ORDER — HYDROCHLOROTHIAZIDE 12.5 MG/1
12.5 TABLET ORAL DAILY
Qty: 90 TABLET | Refills: 0 | OUTPATIENT
Start: 2023-11-24

## 2023-11-25 NOTE — TELEPHONE ENCOUNTER
Duplicate request, previously addressed. Rx sent 11/20/23 to [facility] pharmacy. Refill from [facility] denied at this time.     Requested Prescriptions   Pending Prescriptions Disp Refills    HYDROCHLOROTHIAZIDE 12.5 MG Oral Tab [Pharmacy Med Name: HYDROCHLOROTHIAZIDE 12.5MG TABLETS] 90 tablet 0     Sig: TAKE 1 TABLET BY MOUTH DAILY       Hypertensive Medications Protocol Failed - 11/24/2023  1:37 PM        Failed - In person appointment or virtual visit in the past 6 months     Recent Outpatient Visits              5 months ago Postoperative examination    Jacqueline Klein MD    Office Visit    5 months ago Biliary colic    Jacqueline Klein MD    Office Visit    5 months ago RUQ pain    [facility], [address], Elizabeth Soledad Meier MD    Office Visit    8 months ago Wellness examination    [address]    Nurse Only    9 months ago Viral syndrome    [address] [place] [address] Lester Luciano MD    Telemedicine          Future Appointments         Provider Department Appt Notes    In 4 months Mina Short, DO [address] [place],[address], [address] - In person appointment in the past 12 or next 3 months     Recent Outpatient Visits              5 months ago Postoperative examination    Jacqueline Klein MD    Office Visit    5 months ago Biliary colic    Jacqueline Klein MD    Office Visit    5 months ago RUQ pain    [address] [place] [address] Mike Bustamante MD    Office Visit    8 months ago Wellness examination    [address]    Nurse Only    9 months ago Viral syndrome    [address] [place] [address] Destinee Ray MD    Telemedicine Future Appointments         Provider Department Appt Notes    In 4 months Gem Sanchez DO [address] [facility], [place]               Passed - Last BP reading less than 140/90     BP Readings from Last 1 Encounters:   06/17/23 123/80               Passed - CMP or BMP in past 6 months     Recent Results (from the past 4392 hour(s))   Comp Metabolic Panel (14)    Collection Time: 06/08/23  2:42 PM   Result Value Ref Range    Glucose 78 70 - 99 mg/dL    Sodium 142 136 - 145 mmol/L    Potassium 3.7 3.5 - 5.1 mmol/L    Chloride 109 98 - 112 mmol/L    CO2 29.0 21.0 - 32.0 mmol/L    Anion Gap 4 0 - 18 mmol/L    BUN 13 7 - 18 mg/dL    Creatinine 0.99 0.55 - 1.02 mg/dL    BUN/CREA Ratio 13.1 10.0 - 20.0    Calcium, Total 9.4 8.5 - 10.1 mg/dL    Calculated Osmolality 293 275 - 295 mOsm/kg    eGFR-Cr 67 >=60 mL/min/1.73m2    ALT 33 13 - 56 U/L    AST 21 15 - 37 U/L    Alkaline Phosphatase 82 46 - 118 U/L    Bilirubin, Total 0.6 0.1 - 2.0 mg/dL    Total Protein 7.4 6.4 - 8.2 g/dL    Albumin 3.7 3.4 - 5.0 g/dL    Globulin  3.7 2.8 - 4.4 g/dL    A/G Ratio 1.0 1.0 - 2.0    Patient Fasting for CMP? No      *Note: Due to a large number of results and/or encounters for the requested time period, some results have not been displayed. A complete set of results can be found in Results Review.                Passed - EGFRCR or GFRAA > 50     GFR Evaluation  EGFRCR: 67 , resulted on 6/8/2023

## 2023-12-29 ENCOUNTER — APPOINTMENT (OUTPATIENT)
Dept: GENERAL RADIOLOGY | Age: 58
End: 2023-12-29
Attending: EMERGENCY MEDICINE
Payer: COMMERCIAL

## 2023-12-29 ENCOUNTER — HOSPITAL ENCOUNTER (EMERGENCY)
Age: 58
Discharge: HOME | End: 2023-12-29
Attending: EMERGENCY MEDICINE
Payer: COMMERCIAL

## 2023-12-29 ENCOUNTER — NURSE TRIAGE (OUTPATIENT)
Dept: FAMILY MEDICINE CLINIC | Facility: CLINIC | Age: 58
End: 2023-12-29

## 2023-12-29 VITALS
BODY MASS INDEX: 34.16 KG/M2 | SYSTOLIC BLOOD PRESSURE: 140 MMHG | HEIGHT: 65 IN | OXYGEN SATURATION: 95 % | DIASTOLIC BLOOD PRESSURE: 88 MMHG | HEART RATE: 84 BPM | RESPIRATION RATE: 16 BRPM | WEIGHT: 205 LBS | TEMPERATURE: 98 F

## 2023-12-29 DIAGNOSIS — M54.16 LUMBAR RADICULOPATHY: Primary | ICD-10-CM

## 2023-12-29 PROCEDURE — 99283 EMERGENCY DEPT VISIT LOW MDM: CPT

## 2023-12-29 PROCEDURE — 99284 EMERGENCY DEPT VISIT MOD MDM: CPT

## 2023-12-29 PROCEDURE — 72110 X-RAY EXAM L-2 SPINE 4/>VWS: CPT | Performed by: EMERGENCY MEDICINE

## 2023-12-29 RX ORDER — METHYLPREDNISOLONE 4 MG/1
TABLET ORAL
Qty: 1 EACH | Refills: 0 | Status: SHIPPED | OUTPATIENT
Start: 2023-12-29

## 2023-12-29 NOTE — TELEPHONE ENCOUNTER
Please reply to pool: EM RN TRIAGE  Action Requested: Summary for Provider     []  Critical Lab, Recommendations Needed  [] Need Additional Advice  [x]   FYI    []   Need Orders  [] Need Medications Sent to Pharmacy  []  Other     SUMMARY: Patient contacts clinic reporting low back and hip/leg pain that began 3 days ago. No known trauma. Pain starts at back and shifts around to hip and the front of her leg. Burning sensation. Altering her gait. Denies swelling, chest pain, shortness of breath, dizziness or lightheadedness. Pain is constant. IC evaluation advised. Patient agrees and will go to [place] location.     Reason for call: Leg Pain  Onset: Data Unavailable                       Reason for Disposition   Numbness in a leg or foot (i.e., loss of sensation)    Protocols used: Leg Pain-A-OH

## 2023-12-29 NOTE — ED INITIAL ASSESSMENT (HPI)
Patient here with right hip pain. States it was present when she woke up Wednesday. Denies trauma. States pain has gotten worse, radiates down leg. Ambulatory.

## 2023-12-30 ENCOUNTER — TELEPHONE (OUTPATIENT)
Dept: FAMILY MEDICINE CLINIC | Facility: CLINIC | Age: 58
End: 2023-12-30

## 2023-12-30 DIAGNOSIS — M48.061 SPINAL STENOSIS OF LUMBAR REGION, UNSPECIFIED WHETHER NEUROGENIC CLAUDICATION PRESENT: Primary | ICD-10-CM

## 2023-12-30 NOTE — TELEPHONE ENCOUNTER
Patient calling (identified name and ) called yesterday and was seen in ED for lower back pain. States she was told she has lumber stenosis and a pinched nerve. Has been alternating tylenol and ibuprofen since  and icing the lower back without relief. Was prescribed steroid dose pack but has not been contacted by pharmacy to  medication. States she is having difficulty walking and the pain increases with change in position. Advised for patient to contact the pharmacy to start medication right away. Patient asking if Dr. Baljinder Westbrook can prescribed something else for pain until the steroids kick in once it is available. Was also instructed to follow up with PCP to obtain an MRI. Please advise.

## 2023-12-31 NOTE — TELEPHONE ENCOUNTER
The Medrol pack is the medication of choice, so that should be obtained and taken as directed. The patient with be much better served by seeing an one of our physiatry specialist regarding her stated \"lumbar stenosis\".   See if the patient is agreeable and I can generate a referral.

## 2024-01-02 ENCOUNTER — NURSE TRIAGE (OUTPATIENT)
Dept: FAMILY MEDICINE CLINIC | Facility: CLINIC | Age: 59
End: 2024-01-02

## 2024-01-02 DIAGNOSIS — M48.061 SPINAL STENOSIS OF LUMBAR REGION, UNSPECIFIED WHETHER NEUROGENIC CLAUDICATION PRESENT: Primary | ICD-10-CM

## 2024-01-02 RX ORDER — GABAPENTIN 300 MG/1
300 CAPSULE ORAL 3 TIMES DAILY
Qty: 90 CAPSULE | Refills: 0 | Status: SHIPPED | OUTPATIENT
Start: 2024-01-02 | End: 2024-01-03

## 2024-01-02 NOTE — TELEPHONE ENCOUNTER
Prescription for gabapentin has been sent to the pharmacy.  Please call the patient and let her know.  This is a nerve desensitizing medication.  Hopefully this helps.

## 2024-01-02 NOTE — TELEPHONE ENCOUNTER
Action Requested: Summary for Provider     []  Critical Lab, Recommendations Needed  [x] Need Additional Advice  []   FYI    []   Need Orders  [] Need Medications Sent to Pharmacy  []  Other     SUMMARY: Patient was seen in ER for back pain 12/29/23. Scheduled visit for tomorrow for follow up (soonest available)  and asking for something for pain control in the meanwhile. Taking steroids, advil and tylenol but nothing is helping her.     Has appointment with Specialist Dr. Behar 1/15/24    Has been home from work, had now job and concerned about missing work. Pain not improving.       Reason for call: Back Pain  Onset: Pain started last friday      Reason for Disposition   Pain radiates into the thigh or further down the leg, and in both legs    Protocols used: Back Pain-A-OH

## 2024-01-03 ENCOUNTER — OFFICE VISIT (OUTPATIENT)
Dept: FAMILY MEDICINE CLINIC | Facility: CLINIC | Age: 59
End: 2024-01-03

## 2024-01-03 VITALS
RESPIRATION RATE: 16 BRPM | DIASTOLIC BLOOD PRESSURE: 81 MMHG | HEART RATE: 71 BPM | SYSTOLIC BLOOD PRESSURE: 121 MMHG | BODY MASS INDEX: 34 KG/M2 | WEIGHT: 205 LBS | TEMPERATURE: 98 F

## 2024-01-03 DIAGNOSIS — M79.18 GLUTEAL PAIN: Primary | ICD-10-CM

## 2024-01-03 DIAGNOSIS — M79.604 RIGHT LEG PAIN: ICD-10-CM

## 2024-01-03 PROCEDURE — 3074F SYST BP LT 130 MM HG: CPT | Performed by: FAMILY MEDICINE

## 2024-01-03 PROCEDURE — 99214 OFFICE O/P EST MOD 30 MIN: CPT | Performed by: FAMILY MEDICINE

## 2024-01-03 PROCEDURE — 3079F DIAST BP 80-89 MM HG: CPT | Performed by: FAMILY MEDICINE

## 2024-01-03 RX ORDER — METRONIDAZOLE 500 MG/1
500 TABLET ORAL 2 TIMES DAILY
Qty: 14 TABLET | Refills: 0 | Status: SHIPPED | OUTPATIENT
Start: 2024-01-03 | End: 2024-01-10

## 2024-01-03 RX ORDER — HYDROCODONE BITARTRATE AND ACETAMINOPHEN 5; 325 MG/1; MG/1
1 TABLET ORAL EVERY 6 HOURS PRN
Qty: 30 TABLET | Refills: 0 | Status: SHIPPED | OUTPATIENT
Start: 2024-01-03

## 2024-01-03 NOTE — PROGRESS NOTES
HPI: Angi is a 58 year old female who presents for low back pain for one week.  Woke up last Wednesday and fell back in bed. Hurt to get back out of bed.  By Friday, she could barely move.  Went to ER that night and was diagnosed with \"pinched nerve and lumbar stenosis\". Started steroid back. Icing.  Took steroid. Taking Ibuprofen and Tylenol without relief. Spoke with FJR last night and he prescribed Gabapentin but she had side effects in the past. Started in right SI joint, travels through right gluteal and down right leg. Not sleeping at night. Tried muscle relaxers in the past without relief. Has appointment with Dr. Behar on 1/15.     Has chronic bacterial vaginosis. Uses Metronidazole. Needs refills.     PMH:    Past Medical History:   Diagnosis Date    Allergy     seasonal allergies    BRCA negative     done at [facility]    Calculus of kidney 2015    Colon polyp     Diverticular disease     Can’t remember… Found out 3 years ago    Esophageal reflux     Fibroid, uterine     fibroids [women's health].  2001 total abdominal hysterectomy    Gastritis     High blood pressure     High cholesterol     Hyperlipidemia     IBS (irritable bowel syndrome)     Can’t remember    Left leg injury 1970    severe lt leg injury/laceration.  surgical repair    Migraine headache     migraine headaches    Migraines     PONV (postoperative nausea and vomiting)     Right nephrolithiasis 2013    \"right right kidney stones\"  June 2013    Visual impairment     Reading glasses      Alg:  Morphine and Toradol [ketorolac tromethamine]   Meds:   Current Outpatient Medications on File Prior to Visit   Medication Sig Dispense Refill    methylPREDNISolone (MEDROL) 4 MG Oral Tablet Therapy Pack Dosepack: take as directed 1 each 0    hydroCHLOROthiazide 12.5 MG Oral Tab Take 1 tablet (12.5 mg total) by mouth daily. **Appointment needed for further refills. 90 tablet 0    traMADol 50 MG Oral Tab Take 1 tablet (50 mg total) by mouth every 6  (six) hours as needed. 30 tablet 0    atorvastatin 40 MG Oral Tab Take 1 tablet (40 mg total) by mouth nightly. 90 tablet 1    Zolpidem Tartrate ER 12.5 MG Oral Tab CR Take 1 tablet (12.5 mg total) by mouth nightly as needed for Sleep. (Patient not taking: Reported on 12/29/2023) 30 tablet 0    lansoprazole 30 MG Oral Capsule Delayed Release Take 1 capsule (30 mg total) by mouth every morning before breakfast. 90 capsule 0    cetirizine 10 MG Oral Tab Take 1 tablet (10 mg total) by mouth daily. 90 tablet 3    HYDROcodone-acetaminophen 5-325 MG Oral Tab Take 1 tablet by mouth every 6 (six) hours as needed for Pain. (Patient not taking: Reported on 12/29/2023) 15 tablet 0    triamcinolone 0.1 % External Cream Apply topically 2 (two) times daily. - Topical 80 g 3    amLODIPine 10 MG Oral Tab Take 1 tablet (10 mg total) by mouth daily. 90 tablet 3    dicyclomine 10 MG Oral Cap Take 1 capsule (10 mg total) by mouth 3 (three) times daily as needed. 30 capsule 1    Multiple Vitamins-Minerals (CENTRUM) Oral Liquid Take  by mouth.      aspirin 81 MG Oral Chew Tab Chew 1 tablet (81 mg total) by mouth daily.       No current facility-administered medications on file prior to visit.      Tobacco Use: no    ROS: see HPI    Objective:   Gen: AOx3. NAD.   /81   Pulse 71   Temp 97.9 °F (36.6 °C) (Oral)   Resp 16   Wt 205 lb (93 kg)   BMI 34.11 kg/m²   MSK: guarded gait. Extreme tenderness noted to right gluteal region and right SI joint      Assessment:/Plan:  Encounter Diagnoses   Name Primary?    Gluteal pain Yes    Right leg pain        Continue Medrol dose pack. Start Norco for pain.  Cautioned not to drive or perform activities which require her to be awake while taking medication.  Refer for physical therapy.  Reviewed xr lumbar spine which did not indicate abnormalities.     Colleen Weiler, DO

## 2024-01-04 RX ORDER — HYDROCHLOROTHIAZIDE 12.5 MG/1
12.5 TABLET ORAL DAILY
Qty: 90 TABLET | Refills: 0 | OUTPATIENT
Start: 2024-01-04

## 2024-01-25 NOTE — TELEPHONE ENCOUNTER
Requested Prescriptions     Pending Prescriptions Disp Refills    LANSOPRAZOLE 30 MG Oral Capsule Delayed Release [Pharmacy Med Name: LANSOPRAZOLE 30MG DR CAPSULES] 90 capsule 0     Sig: TAKE 1 CAPSULE(30 MG) BY MOUTH EVERY MORNING BEFORE BREAKFAST         LOV   6/8/2023      LR   8/7/2023      MI

## 2024-01-29 RX ORDER — LANSOPRAZOLE 30 MG/1
30 CAPSULE, DELAYED RELEASE ORAL
Qty: 90 CAPSULE | Refills: 0 | Status: SHIPPED | OUTPATIENT
Start: 2024-01-29

## 2024-02-01 ENCOUNTER — PATIENT MESSAGE (OUTPATIENT)
Dept: FAMILY MEDICINE CLINIC | Facility: CLINIC | Age: 59
End: 2024-02-01

## 2024-02-01 DIAGNOSIS — R07.9 CHEST PAIN, UNSPECIFIED TYPE: Primary | ICD-10-CM

## 2024-02-01 NOTE — TELEPHONE ENCOUNTER
From: Angi Sarkar  To: Charis OJEDADonell Rajinder  Sent: 2024 7:26 AM CST  Subject: Order For Stress Test    Good morning,    I’m requesting a new order for a stress test…. I have an appointment scheduled for this Saturday, 2/3/24 that I have to cancel due to me having to attend a .    Can someone please call me with the update so I can reschedule?    I can be reached at [phone].    Thanks

## 2024-02-14 RX ORDER — HYDROCHLOROTHIAZIDE 12.5 MG/1
12.5 TABLET ORAL DAILY
Qty: 90 TABLET | Refills: 3 | Status: SHIPPED | OUTPATIENT
Start: 2024-02-14

## 2024-02-14 NOTE — TELEPHONE ENCOUNTER
Refill passed per [facility] protocol.  Requested Prescriptions   Pending Prescriptions Disp Refills    hydroCHLOROthiazide 12.5 MG Oral Tab 90 tablet 0     Sig: Take 1 tablet (12.5 mg total) by mouth daily. **Appointment needed for further refills.       Hypertension Medications Protocol Passed - 2/12/2024  1:37 PM        Passed - CMP or BMP in past 12 months        Passed - Last BP reading less than 140/90     BP Readings from Last 1 Encounters:   01/03/24 121/81               Passed - In person appointment or virtual visit in the past 12 mos or appointment in next 3 mos     Recent Outpatient Visits              1 month ago Gluteal pain    [facility] Weiler, Colleen M, DO    Office Visit    7 months ago [facility]    [facility] Visit    8 months ago [facility] colic    [facility]    Office Visit    8 months ago RUQ pain    [facility]    Office Visit    11 months ago Wellness examination    [facility]    Nurse Only          Future Appointments         Provider Department Appt Notes    In 1 week Weiler, Colleen M, DO [facility] OB/Gyne physcial    In 2 weeks [facility] TREADMILL ROOM 1 [facility] Cardiac Stress Testing     In 1 month Gera Dey DO [facility] px               Passed - EGFRCR or GFRAA > 50     GFR Evaluation  EGFRCR: 67 , resulted on 6/8/2023             Recent Outpatient Visits              1 month ago Gluteal pain    [facility] Weiler, Colleen M, DO    Office Visit    7 months ago [facility]operative examination    [facility]    Office Visit    8 months  ago Biliary colic    [facility] [place]    Office Visit    8 months ago RUQ pain    [facility], Eren Anderson MD    Office Visit    11 months ago Wellness examination    [facility]    Nurse Only          Future Appointments         Provider Department Appt Notes    In 1 week Weiler, Colleen M, DO [facility] OB/Gyne physcial    In 2 weeks [facility] TREADMILL ROOM 1 [facility] Cardiac Stress Testing     In 1 month Gera Dey, DO [facility] px

## 2024-02-24 ENCOUNTER — OFFICE VISIT (OUTPATIENT)
Dept: FAMILY MEDICINE CLINIC | Facility: CLINIC | Age: 59
End: 2024-02-24
Payer: COMMERCIAL

## 2024-02-24 VITALS
TEMPERATURE: 99 F | SYSTOLIC BLOOD PRESSURE: 106 MMHG | WEIGHT: 200 LBS | BODY MASS INDEX: 33.32 KG/M2 | DIASTOLIC BLOOD PRESSURE: 73 MMHG | HEIGHT: 65 IN | HEART RATE: 89 BPM | RESPIRATION RATE: 16 BRPM

## 2024-02-24 DIAGNOSIS — Z01.419 ENCOUNTER FOR ANNUAL ROUTINE GYNECOLOGICAL EXAMINATION: Primary | ICD-10-CM

## 2024-02-24 DIAGNOSIS — G43.001 MIGRAINE WITHOUT AURA AND WITH STATUS MIGRAINOSUS, NOT INTRACTABLE: ICD-10-CM

## 2024-02-24 DIAGNOSIS — R10.2 PELVIC PAIN: ICD-10-CM

## 2024-02-24 DIAGNOSIS — L20.9 ATOPIC DERMATITIS, UNSPECIFIED TYPE: ICD-10-CM

## 2024-02-24 DIAGNOSIS — F41.8 INSOMNIA SECONDARY TO DEPRESSION WITH ANXIETY: ICD-10-CM

## 2024-02-24 DIAGNOSIS — F51.05 INSOMNIA SECONDARY TO DEPRESSION WITH ANXIETY: ICD-10-CM

## 2024-02-24 DIAGNOSIS — J01.10 ACUTE NON-RECURRENT FRONTAL SINUSITIS: ICD-10-CM

## 2024-02-24 PROCEDURE — 3008F BODY MASS INDEX DOCD: CPT | Performed by: FAMILY MEDICINE

## 2024-02-24 PROCEDURE — 99396 PREV VISIT EST AGE 40-64: CPT | Performed by: FAMILY MEDICINE

## 2024-02-24 PROCEDURE — 3078F DIAST BP <80 MM HG: CPT | Performed by: FAMILY MEDICINE

## 2024-02-24 PROCEDURE — 3074F SYST BP LT 130 MM HG: CPT | Performed by: FAMILY MEDICINE

## 2024-02-24 RX ORDER — ZOLPIDEM TARTRATE 12.5 MG/1
12.5 TABLET, FILM COATED, EXTENDED RELEASE ORAL NIGHTLY PRN
Qty: 30 TABLET | Refills: 0 | Status: SHIPPED | OUTPATIENT
Start: 2024-02-24

## 2024-02-24 RX ORDER — TRAMADOL HYDROCHLORIDE 50 MG/1
50 TABLET ORAL EVERY 6 HOURS PRN
Qty: 30 TABLET | Refills: 0 | Status: SHIPPED | OUTPATIENT
Start: 2024-02-24

## 2024-02-24 RX ORDER — TRIAMCINOLONE ACETONIDE 1 MG/G
CREAM TOPICAL
Qty: 80 G | Refills: 3 | Status: SHIPPED | OUTPATIENT
Start: 2024-02-24

## 2024-02-24 RX ORDER — DOXYCYCLINE HYCLATE 100 MG
100 TABLET ORAL 2 TIMES DAILY
Qty: 14 TABLET | Refills: 0 | Status: SHIPPED | OUTPATIENT
Start: 2024-02-24 | End: 2024-03-02

## 2024-02-24 NOTE — PROGRESS NOTES
HPI: Angi is a 58 year old female who presents for cold symptoms.  Has body aches, congestion, headaches. Started 2 weeks ago. Cough has improved. No fever.  COVID test negative. Having sinus pressure and headaches.     Has been having pelvic cramps and pressure for 3 weeks. Has tenderness. No bleeding or discharge. Sexually active with one partner.  No urinary symptoms. Had partial hysterectomy.  Still has cervix and ovaries.     PMH:    Past Medical History:   Diagnosis Date    Allergy     seasonal allergies    BRCA negative     done at [facility]    Calculus of kidney 2015    Colon polyp     Diverticular disease     Can’t remember… Found out 3 years ago    Esophageal reflux     Fibroid, uterine     fibroids [women's health].  2001 total abdominal hysterectomy    Gastritis     High blood pressure     High cholesterol     Hyperlipidemia     IBS (irritable bowel syndrome)     Can’t remember    Left leg injury 1970    severe lt leg injury/laceration.  surgical repair    Migraine headache     migraine headaches    Migraines     PONV (postoperative nausea and vomiting)     Right nephrolithiasis 2013    \"right right kidney stones\"  June 2013    Visual impairment     Reading glasses      Alg:  Morphine and Toradol [ketorolac tromethamine]   Meds:   Current Outpatient Medications on File Prior to Visit   Medication Sig Dispense Refill    hydroCHLOROthiazide 12.5 MG Oral Tab Take 1 tablet (12.5 mg total) by mouth daily. 90 tablet 3    lansoprazole 30 MG Oral Capsule Delayed Release Take 1 capsule (30 mg total) by mouth before breakfast. 90 capsule 0    atorvastatin 40 MG Oral Tab Take 1 tablet (40 mg total) by mouth nightly. 90 tablet 1    cetirizine 10 MG Oral Tab Take 1 tablet (10 mg total) by mouth daily. 90 tablet 3    amLODIPine 10 MG Oral Tab Take 1 tablet (10 mg total) by mouth daily. 90 tablet 3    Multiple Vitamins-Minerals (CENTRUM) Oral Liquid Take  by mouth.      aspirin 81 MG Oral Chew Tab Chew 1 tablet  (81 mg total) by mouth daily.      HYDROcodone-acetaminophen 5-325 MG Oral Tab Take 1 tablet by mouth every 6 (six) hours as needed for Pain. (Patient not taking: Reported on 2/24/2024) 30 tablet 0    methylPREDNISolone (MEDROL) 4 MG Oral Tablet Therapy Pack Dosepack: take as directed (Patient not taking: Reported on 2/24/2024) 1 each 0    traMADol 50 MG Oral Tab Take 1 tablet (50 mg total) by mouth every 6 (six) hours as needed. (Patient not taking: Reported on 2/24/2024) 30 tablet 0    Zolpidem Tartrate ER 12.5 MG Oral Tab CR Take 1 tablet (12.5 mg total) by mouth nightly as needed for Sleep. (Patient not taking: Reported on 2/24/2024) 30 tablet 0    triamcinolone 0.1 % External Cream Apply topically 2 (two) times daily. - Topical (Patient not taking: Reported on 2/24/2024) 80 g 3    dicyclomine 10 MG Oral Cap Take 1 capsule (10 mg total) by mouth 3 (three) times daily as needed. (Patient not taking: Reported on 2/24/2024) 30 capsule 1     No current facility-administered medications on file prior to visit.      Tobacco Use: no    ROS: see HPI    Objective:   Gen: AOx3. NAD.   /73 (BP Location: Left arm, Patient Position: Sitting, Cuff Size: large)   Pulse 89   Temp 98.7 °F (37.1 °C)   Resp 16   Ht 5' 5\" (1.651 m)   Wt 200 lb (90.7 kg)   BMI 33.28 kg/m²   HEENT: Conjunctive clear.  Prem ear canals clear.  Prem TMs intact with good landmarks noted.  Nares patent.  Oral mucous membrane moist.  Normal lips, teeth, and gums.  Oropharynx normal.  Neck supple.  Good ROM.  No LAD.  Thyroid normal.  CV:  Regular rate and rhythm; no murmurs  Lungs:  Clear to ausculation; good aeration               No wheezes, rales or rhonchi  :  External genitalia normal.  Urethral meatus normal.  No lesions noted.  Vaginal mucose normal.  Normal cervix.  No uterine or ovarian masses noted on exam.          Assessment:/Plan:  Encounter Diagnoses   Name Primary?    Encounter for annual routine gynecological examination    Pap  and HPV done.  Will call with results.    Yes    Insomnia secondary to depression with anxiety    Continue Zolpidem.        Atopic dermatitis, unspecified type    Apply Triamcinolone BID.       Migraine without aura and with status migrainosus, not intractable     Pelvic pain    Vaginal swab done as well as GC/chlamydia.  US pelvis ordered.        Acute non-recurrent frontal sinusitis    Start Doxycycline.         Colleen Weiler, DO

## 2024-02-25 LAB
BV BACTERIA DNA VAG QL NAA+PROBE: NEGATIVE
C GLABRATA DNA VAG QL NAA+PROBE: NEGATIVE
C KRUSEI DNA VAG QL NAA+PROBE: NEGATIVE
CANDIDA DNA VAG QL NAA+PROBE: NEGATIVE
T VAGINALIS DNA VAG QL NAA+PROBE: NEGATIVE

## 2024-02-26 LAB
C TRACH DNA SPEC QL NAA+PROBE: NEGATIVE
N GONORRHOEA DNA SPEC QL NAA+PROBE: NEGATIVE

## 2024-03-01 ENCOUNTER — PATIENT MESSAGE (OUTPATIENT)
Facility: CLINIC | Age: 59
End: 2024-03-01

## 2024-03-02 ENCOUNTER — HOSPITAL ENCOUNTER (OUTPATIENT)
Dept: CV DIAGNOSTICS | Facility: HOSPITAL | Age: 59
Discharge: HOME | End: 2024-03-02
Attending: FAMILY MEDICINE
Payer: COMMERCIAL

## 2024-03-02 DIAGNOSIS — R07.9 CHEST PAIN, UNSPECIFIED TYPE: ICD-10-CM

## 2024-03-02 PROCEDURE — 93017 CV STRESS TEST TRACING ONLY: CPT | Performed by: FAMILY MEDICINE

## 2024-03-02 PROCEDURE — 93018 CV STRESS TEST I&R ONLY: CPT | Performed by: FAMILY MEDICINE

## 2024-03-04 RX ORDER — LANSOPRAZOLE 30 MG/1
30 CAPSULE, DELAYED RELEASE ORAL
Qty: 90 CAPSULE | Refills: 1 | Status: SHIPPED | OUTPATIENT
Start: 2024-03-04

## 2024-03-04 NOTE — TELEPHONE ENCOUNTER
From: Angi Sarkar  To: Eren Dickinson  Sent: 3/1/2024 5:17 PM CST  Subject: Lansoprazole 30mg    Good evening,    I was wondering if you could place the refill for 90 days instead of 30 days, it’s much cheaper.    For 30 days I’m spending anywhere from $18 to $22.    Please let me know, [facility] stated you would have to request it…    Thanks.

## 2024-03-04 NOTE — TELEPHONE ENCOUNTER
PPI (Proton Pump Inhibitors): Nexium (Esomeprazole), Protonix (Pantoprazole), Dexilant (Dexlansoprazole), Prevacid (Lansoprazole), Aciphex (Rabeprazole), Omperazole      Protocol Criteria  Review last office visit note(s), plan of care, for any change   \"[place] low-fat diet, trial of dicyclomine as needed before meals. Continue on lansoprazole.\"   Appointment scheduled within the past 12 months or in the next 3 months or had a procedure (if applicable) and is up to date with their recall     Date of Last Office Visit: 6/8/2024    Date of next scheduled appointment: n/a    Date of last procedure (if applicable): 8/28/2020    Date of recall (if applicable):   8/28/2027    If used for [facility] patient is up to date with appointment     Date of Last Office Visit

## 2024-03-07 LAB
HPV I/H RISK 1 DNA SPEC QL NAA+PROBE: NEGATIVE
LAST PAP RESULT: NORMAL
PAP HISTORY (OTHER THAN LAST PAP): NORMAL

## 2024-03-30 ENCOUNTER — HOSPITAL ENCOUNTER (OUTPATIENT)
Dept: ULTRASOUND IMAGING | Age: 59
Discharge: HOME | End: 2024-03-30
Attending: FAMILY MEDICINE
Payer: COMMERCIAL

## 2024-03-30 DIAGNOSIS — R10.2 PELVIC PAIN: ICD-10-CM

## 2024-03-30 PROCEDURE — 76830 TRANSVAGINAL US NON-OB: CPT | Performed by: FAMILY MEDICINE

## 2024-03-30 PROCEDURE — 76856 US EXAM PELVIC COMPLETE: CPT | Performed by: FAMILY MEDICINE

## 2024-04-26 ENCOUNTER — NURSE TRIAGE (OUTPATIENT)
Dept: FAMILY MEDICINE CLINIC | Facility: CLINIC | Age: 59
End: 2024-04-26

## 2024-04-26 DIAGNOSIS — G43.001 MIGRAINE WITHOUT AURA AND WITH STATUS MIGRAINOSUS, NOT INTRACTABLE: ICD-10-CM

## 2024-04-26 DIAGNOSIS — G43.001 MIGRAINE WITHOUT AURA AND WITH STATUS MIGRAINOSUS, NOT INTRACTABLE: Primary | ICD-10-CM

## 2024-04-26 RX ORDER — TRAMADOL HYDROCHLORIDE 50 MG/1
50 TABLET ORAL EVERY 6 HOURS PRN
Qty: 30 TABLET | Refills: 0 | Status: CANCELLED | OUTPATIENT
Start: 2024-04-26

## 2024-04-26 RX ORDER — TRAMADOL HYDROCHLORIDE 50 MG/1
50 TABLET ORAL EVERY 6 HOURS PRN
Qty: 30 TABLET | Refills: 0 | Status: SHIPPED | OUTPATIENT
Start: 2024-04-26

## 2024-04-26 NOTE — TELEPHONE ENCOUNTER
Tramadol refill sent to pharmacy.  Blood pressure is not acutely dangerous, no urgent need forappointment tomorrow, blood pressure should be addressed by PCP.  Please schedule appointment with PCP.  Continue to check home blood pressures about twice a week.  Bring readings to appointment.  Call if greater than 160/110.

## 2024-04-26 NOTE — TELEPHONE ENCOUNTER
Spoke with patient and notified.  Next Saturday availability is 05/25.  Dr. Dey to review upon his return (patient aware he is out).  Ok to use res 24 that day?

## 2024-04-26 NOTE — TELEPHONE ENCOUNTER
Action Requested: Summary for Provider     []  Critical Lab, Recommendations Needed  [x] Need Additional Advice  []   FYI    []   Need Orders  [] Need Medications Sent to Pharmacy  []  Other     SUMMARY: patient is calling regarding concerns of her blood pressure readings and she is feeling intermittent dizziness, headache and some blurred vision. She also mentioned she has headaches for which her pcp prescribes Tramadol for and she is currently needing a refill. (See Telephone encounter for refill created today)   She does take blood pressure medications. She advised she has a blood pressure cuff at home and took her reading yesterday evening on the right upper arm and it read 131/50. I did advise this is a good reading even thought the diastolic is a bit low. She advised last night at 10:30 pm she took it again same arm and it read 131/105. She took it again while on the phone with me and it was 133/97. She advised she recently missed her appointment for 4/13/24. Asking to come into the office but can only come on a Saturday due to work. I reviewed the schedule and there are no available appointments for Saturday. Dr. Calvillo, would you be able to see patient on Saturday?     Please advise    RN's call patient at [phone] ( ok to leave detailed message)        Reason for call: Blood Pressure, Headache, and Eye Problem  Onset: Data Unavailable                   Reason for Disposition   Headache is a chronic symptom (recurrent or ongoing AND lasting > 4 weeks)   Systolic BP >= 130 OR Diastolic >= 80, and is taking BP medications   Brief (now gone) blurred vision and unexplained    Protocols used: Blood Pressure - High-A-OH, Headache-A-OH, Vision Loss or Change-A-OH

## 2024-04-26 NOTE — TELEPHONE ENCOUNTER
Review pended refill request as it does not fall under a protocol.    Last filled: 2/24/2024  Last office visit: 2/24/2024

## 2024-05-09 ENCOUNTER — OFFICE VISIT (OUTPATIENT)
Dept: FAMILY MEDICINE CLINIC | Facility: CLINIC | Age: 59
End: 2024-05-09
Payer: COMMERCIAL

## 2024-05-09 VITALS
BODY MASS INDEX: 34 KG/M2 | SYSTOLIC BLOOD PRESSURE: 112 MMHG | OXYGEN SATURATION: 96 % | RESPIRATION RATE: 18 BRPM | TEMPERATURE: 98 F | HEART RATE: 71 BPM | DIASTOLIC BLOOD PRESSURE: 80 MMHG | WEIGHT: 203 LBS

## 2024-05-09 DIAGNOSIS — R53.83 FATIGUE, UNSPECIFIED TYPE: ICD-10-CM

## 2024-05-09 DIAGNOSIS — I10 ESSENTIAL HYPERTENSION: ICD-10-CM

## 2024-05-09 DIAGNOSIS — Z13.220 LIPID SCREENING: ICD-10-CM

## 2024-05-09 DIAGNOSIS — G43.001 MIGRAINE WITHOUT AURA AND WITH STATUS MIGRAINOSUS, NOT INTRACTABLE: ICD-10-CM

## 2024-05-09 DIAGNOSIS — R10.13 EPIGASTRIC PAIN: ICD-10-CM

## 2024-05-09 DIAGNOSIS — K92.1 MELENA: Primary | ICD-10-CM

## 2024-05-09 DIAGNOSIS — F51.05 INSOMNIA SECONDARY TO DEPRESSION WITH ANXIETY: ICD-10-CM

## 2024-05-09 DIAGNOSIS — E66.9 CLASS 1 OBESITY WITH SERIOUS COMORBIDITY AND BODY MASS INDEX (BMI) OF 33.0 TO 33.9 IN ADULT, UNSPECIFIED OBESITY TYPE: ICD-10-CM

## 2024-05-09 DIAGNOSIS — F41.8 INSOMNIA SECONDARY TO DEPRESSION WITH ANXIETY: ICD-10-CM

## 2024-05-09 PROCEDURE — 3079F DIAST BP 80-89 MM HG: CPT | Performed by: FAMILY MEDICINE

## 2024-05-09 PROCEDURE — 99214 OFFICE O/P EST MOD 30 MIN: CPT | Performed by: FAMILY MEDICINE

## 2024-05-09 PROCEDURE — 3074F SYST BP LT 130 MM HG: CPT | Performed by: FAMILY MEDICINE

## 2024-05-09 RX ORDER — TRAMADOL HYDROCHLORIDE 50 MG/1
50 TABLET ORAL EVERY 6 HOURS PRN
Qty: 30 TABLET | Refills: 0 | Status: SHIPPED | OUTPATIENT
Start: 2024-05-09

## 2024-05-09 RX ORDER — ZOLPIDEM TARTRATE 12.5 MG/1
12.5 TABLET, FILM COATED, EXTENDED RELEASE ORAL NIGHTLY PRN
Qty: 30 TABLET | Refills: 0 | Status: SHIPPED | OUTPATIENT
Start: 2024-05-09

## 2024-05-09 NOTE — PROGRESS NOTES
Subjective:     Patient ID: Angi Sarkar is a 58 year old female.    This patient is a 58-year-old -American female who presents to the clinic today with continuation of epigastric discomfort along with a metallic taste in her mouth along with a temporary complaint of darkened stools without visible bright red blood per rectum.  This series of symptoms began on Easter day and have persisted since that time.  The darkened stools have resolved.  Patient admits to being under a substantial amount of stress.    The patient is taking lansoprazole without any significant relief.  It has been explained to the patient that she will likely need an upper endoscopy to visualize the true stomach to look for active bleed by way of gastric ulceration and also to secure that there has not been any perforation.  Patient also needs samples to rule out H. pylori infection.    Patient offered H. pylori testing, but prefers to see the GI specialist and for any samples to be gathered via the scope.  There is no fever.  There is no vomiting.            History/Other:   Review of Systems  Current Outpatient Medications   Medication Sig Dispense Refill    traMADol 50 MG Oral Tab Take 1 tablet (50 mg total) by mouth every 6 (six) hours as needed for Pain. 30 tablet 0    lansoprazole 30 MG Oral Capsule Delayed Release Take 1 capsule (30 mg total) by mouth before breakfast. 90 capsule 1    Zolpidem Tartrate ER 12.5 MG Oral Tab CR Take 1 tablet (12.5 mg total) by mouth nightly as needed for Sleep. 30 tablet 0    triamcinolone 0.1 % External Cream Apply topically 2 (two) times daily. - Topical 80 g 3    hydroCHLOROthiazide 12.5 MG Oral Tab Take 1 tablet (12.5 mg total) by mouth daily. 90 tablet 3    HYDROcodone-acetaminophen 5-325 MG Oral Tab Take 1 tablet by mouth every 6 (six) hours as needed for Pain. (Patient not taking: Reported on 2/24/2024) 30 tablet 0    methylPREDNISolone (MEDROL) 4 MG Oral Tablet Therapy Pack  Dosepack: take as directed (Patient not taking: Reported on 2/24/2024) 1 each 0    atorvastatin 40 MG Oral Tab Take 1 tablet (40 mg total) by mouth nightly. 90 tablet 1    cetirizine 10 MG Oral Tab Take 1 tablet (10 mg total) by mouth daily. 90 tablet 3    amLODIPine 10 MG Oral Tab Take 1 tablet (10 mg total) by mouth daily. 90 tablet 3    dicyclomine 10 MG Oral Cap Take 1 capsule (10 mg total) by mouth 3 (three) times daily as needed. (Patient not taking: Reported on 2/24/2024) 30 capsule 1    Multiple Vitamins-Minerals (CENTRUM) Oral Liquid Take  by mouth.      aspirin 81 MG Oral Chew Tab Chew 1 tablet (81 mg total) by mouth daily.       Allergies:  Allergies   Allergen Reactions    Morphine HIVES    Toradol [Ketorolac Tromethamine] OTHER (SEE COMMENTS)     Upset stomach per pt       Past Medical History:    Allergy    seasonal allergies    BRCA negative    done at [facility]    Calculus of kidney    Colon polyp    Diverticular disease    Can’t remember… Found out 3 years ago    Esophageal reflux    Fibroid, uterine    fibroids [women's health].  2001 total abdominal hysterectomy    Gastritis    High blood pressure    High cholesterol    Hyperlipidemia    IBS (irritable bowel syndrome)    Can’t remember    Left leg injury    severe lt leg injury/laceration.  surgical repair    Migraine headache    migraine headaches    Migraines    PONV (postoperative nausea and vomiting)    Right nephrolithiasis    \"right right kidney stones\"  June 2013    Visual impairment    Reading glasses      Past Surgical History:   Procedure Laterality Date    Colonoscopy N/A 08/28/2020    Procedure: COLONOSCOPY;  Surgeon: Eren Dickinson MD;  Location: [facility] ENDOSCOPY    Foot surgery Right 2014    [facility] supracervical abdominal hysterectomy  2001    fibroids, still has cervix and ovaries    Hemorrhoidectomy  06/16/2015    PPH hemorrhoidectomy with rectal mucosal proctopexy    Hemorrhoidectomy,int/ext,simple      Can’t remember     Leg/ankle surgery proc unlisted Left 1970    surgical repair, severe lt leg injury/laceration    Myomectomy 5/> intramural myomas &/or total wt >250 gms, abdominal approach  1997    Removal gallbladder      Removal of ovarian cyst(s)      Total abdom hysterectomy      Partial in 2021    Upper gi endoscopy,biopsy  08/28/2020      Family History   Problem Relation Age of Onset    Hypertension Father     Hypertension Mother     Breast Cancer Mother 52    Prostate Cancer Maternal Grandfather 60    Colon Cancer Maternal Grandfather     Cancer Maternal Grandfather     Colon Polyps Maternal Grandfather     Breast Cancer Paternal Grandmother         70's    Cancer Paternal Grandmother     Prostate Cancer Paternal Grandfather     Colon Cancer Paternal Grandfather     Breast Cancer Sister 45    Ovarian Cancer Neg       Social History:   Social History     Socioeconomic History    Marital status:     Number of children: 3   Occupational History    Occupation: Collections   Tobacco Use    Smoking status: Never    Smokeless tobacco: Never    Tobacco comments:     Don't Smoke   Vaping Use    Vaping status: Never Used   Substance and Sexual Activity    Alcohol use: No    Drug use: No    Sexual activity: Yes     Birth control/protection: Hysterectomy     Comment: supracervical  hysterectomy   Other Topics Concern    Caffeine Concern No     Social Determinants of Health      Received from [facility]    Social Connections        Objective:   Vitals:    05/09/24 1716   BP: 112/80   Pulse:    Resp:    Temp:        Physical Exam  Constitutional:       Appearance: Normal appearance.   HENT:      Head: Normocephalic and atraumatic.   Cardiovascular:      Rate and Rhythm: Normal rate and regular rhythm.      Heart sounds: Normal heart sounds.   Pulmonary:      Effort: Pulmonary effort is normal.      Breath sounds: Normal breath sounds.   Abdominal:      Tenderness: There is abdominal tenderness in the  epigastric area.          Comments: Region of discomfort as depicted.   Neurological:      Mental Status: She is alert.         Assessment & Plan:   1. Melena  Patient is being referred to GI for what will likely be an upper endoscopy.  Must rule out bleeding also  - Gastro Referral - In Network    2. Epigastric pain  Same as #1.  - Gastro Referral - In Network    3. Essential hypertension  Blood pressure measures to goal.    4. Lipid screening  Ordered.  - Lipid Panel; Future    5. Fatigue, unspecified type  Ordered.  - CBC With Differential With Platelet; Future  - Comp Metabolic Panel (14); Future  - TSH W Reflex To Free T4; Future  - Urinalysis, Routine; Future    6. Class 1 obesity with serious comorbidity and body mass index (BMI) of 33.0 to 33.9 in adult, unspecified obesity type  Recommend weight loss via daily exercising and consistent healthy dietary changes.    7. Migraine without aura and with status migrainosus, not intractable  Medication reviewed and renewed.  - traMADol 50 MG Oral Tab; Take 1 tablet (50 mg total) by mouth every 6 (six) hours as needed for Pain.  Dispense: 30 tablet; Refill: 0    8. Insomnia secondary to depression with anxiety  Medication reviewed and renewed.  - Zolpidem Tartrate ER 12.5 MG Oral Tab CR; Take 1 tablet (12.5 mg total) by mouth nightly as needed for Sleep.  Dispense: 30 tablet; Refill: 0        No orders of the defined types were placed in this encounter.      Meds This Visit:  Requested Prescriptions      No prescriptions requested or ordered in this encounter       Imaging & Referrals:  GASTRO - INTERNAL     Patient Instructions   To GI for potential EGD in lieu of melena.     Return in about 6 weeks (around 6/20/2024), or if symptoms worsen or fail to improve.

## 2024-05-10 ENCOUNTER — TELEPHONE (OUTPATIENT)
Facility: CLINIC | Age: 59
End: 2024-05-10

## 2024-05-10 DIAGNOSIS — R10.9 ABDOMINAL PAIN, UNSPECIFIED ABDOMINAL LOCATION: Primary | ICD-10-CM

## 2024-05-10 NOTE — TELEPHONE ENCOUNTER
Patient calling with complaints of Upper abdominal pain.  Patient requesting to speak with RN, she feels she needs to repeat Esophagogastroduodenoscopy and her PCP agrees.  Please call

## 2024-05-10 NOTE — TELEPHONE ENCOUNTER
Dr. Dickinson    Patient saw PCP in office yesterday EGD was recommended.  Patient reports upper abdominal pain that started 2 weeks ago.  On lansoprazole which does help  Reports dark/black stools before which started clearing up this Tuesday.   She also reports a metallic taste in her mouth.  Denies any shortness of breath.    Reviewed symptoms that would prompt ER visit.    Please advise     Thank you

## 2024-05-13 NOTE — TELEPHONE ENCOUNTER
Patient is asking for a Monday or Wednesday procedure only - offered sooner opening on 5/17/2024 but patient declined. Procedure is added to the wait list.    Scheduled for: EGD 73913  Provider Name:  Dr. Dickinson  Date: 8/26/2024  Location:  [facility]  Sedation:  MAC  Time: 2:00 PM (patient is aware arrival time is 1:00 PM)   Prep:  NPO after midnight  Meds/Allergies Reconciled?:  Physician reviewed  Diagnosis with codes: Abdominal pain R10.9  Was patient informed to call insurance with codes (Y/N):  Yes, I confirmed [employer] PPO insurance with the patient.  Referral sent?: Referral was sent at the time of electronic surgical scheduling.   EM or [facility] notified?: I sent an electronic request to Endo Scheduling and received a confirmation today.   Medication Orders: Hold multivitamins/supplements one week prior to procedure.  Misc Orders: N/A     Further instructions given by staff: I discussed the prep instructions with the patient which she verbally understood and is aware that I will send the instructions today.

## 2024-05-13 NOTE — TELEPHONE ENCOUNTER
Called and spoke with patient, name/ verified.    Stated that she still has the pain, she continues to take Lansoprazole daily.     Informed her that Dr. Dickinson ordered EGD for her abd'l pain, our  will reach out to her to schedule for procedure.     Pt verbalized understanding.

## 2024-05-30 ENCOUNTER — TELEPHONE (OUTPATIENT)
Facility: CLINIC | Age: 59
End: 2024-05-30

## 2024-05-30 DIAGNOSIS — R10.9 ABDOMINAL PAIN, UNSPECIFIED ABDOMINAL LOCATION: Primary | ICD-10-CM

## 2024-05-30 NOTE — TELEPHONE ENCOUNTER
Patient called to ask if her procedure can be moved up, procedure is scheduled on 8/26. Please advise.

## 2024-05-31 RX ORDER — AMLODIPINE BESYLATE 10 MG/1
10 TABLET ORAL DAILY
Qty: 90 TABLET | Refills: 3 | Status: SHIPPED | OUTPATIENT
Start: 2024-05-31

## 2024-05-31 NOTE — TELEPHONE ENCOUNTER
I spoke to patient and informed her that I only have one cancellation and it not a Mon or Wed. Patient agreed to new date 6/06/24.    Re-scheduled for: EGD 62278   Provider Name: Dr Dickinson   Date: Moved to:  Thurs 6/06/2024  Location: moved to: [facility]                           From: [facility]   Sedation: MAC   Time: Moved to: 7:45 am                           From: 2 pm  Prep: Nothing after midnight the day before procedure and NPO 3 hrs prior procedure  Meds/Allergies Reconciled?: reviewed by provider   Diagnosis with codes: abdominal pain R10.9   Was patient informed to call insurance with codes (Y/N):  Yes  Referral sent?: Yes, [employer] PPO, routed HP to PPD team  [facility] or [facility] notified?:  I sent an electronic request to Endo Scheduling and received a confirmation today.    Medication Orders:  Patient is aware to NOT take iron pills, herbal meds and diet supplements for 7 days before exam. Also to NOT take any form of alcohol, recreational drugs and any forms of ED meds 24-72 hours before exam.     [facility]      Further instructions given by staff: Instructions given on phone and pt verbalized understanding

## 2024-06-01 NOTE — TELEPHONE ENCOUNTER
Refill Per Protocol     Requested Prescriptions   Pending Prescriptions Disp Refills    amLODIPine 10 MG Oral Tab 90 tablet 3     Sig: Take 1 tablet (10 mg total) by mouth daily.       Hypertension Medications Protocol Passed - 5/29/2024  2:02 PM        Passed - CMP or BMP in past 12 months        Passed - Last BP reading less than 140/90     BP Readings from Last 1 Encounters:   05/09/24 112/80               Passed - In person appointment or virtual visit in the past 12 mos or appointment in next 3 mos     Recent Outpatient Visits              3 weeks ago [facility]    Office Visit    3 months ago Encounter for annual routine gynecological examination    [facility] We[facility], Colleen M,     Office Visit    4 months ago Gluteal pain    [facility] We[place], Colleen M,     Office Visit    11 months ago [facility]    [facility]    11 months ago [facility] colic    [facility]          Future Appointments         Provider Department Appt Notes    [facility] 6 [facility]                    Passed - EGFRCR or GFRAA > 50     GFR Evaluation  EGFRCR: 67 , resulted on 6/8/2023                 Future Appointments         Provider Department Appt Notes    In 6 days [facility]          Recent Outpatient Visits              3 weeks ago [facility], [facility] Gera Dey,     Office Visit    3 months ago Encounter for annual routine gynecological examination    [facility], [facility], DO     Office Visit    4 months ago Gluteal pain    [facility], [facility] Weiler, Colleen M,     Office Visit    11 months ago Postoperative examination    [facility], [facility], Nikhil Gama MD    Office Visit    11 months ago Biliary colic    [facility], [facility], Nikhil Gama MD    Office Visit

## 2024-06-06 ENCOUNTER — HOSPITAL ENCOUNTER (OUTPATIENT)
Facility: HOSPITAL | Age: 59
Setting detail: HOSPITAL OUTPATIENT SURGERY
Discharge: HOME | End: 2024-06-06
Attending: INTERNAL MEDICINE | Admitting: INTERNAL MEDICINE
Payer: COMMERCIAL

## 2024-06-06 ENCOUNTER — ANESTHESIA (OUTPATIENT)
Dept: ENDOSCOPY | Facility: HOSPITAL | Age: 59
End: 2024-06-06
Payer: COMMERCIAL

## 2024-06-06 ENCOUNTER — ANESTHESIA EVENT (OUTPATIENT)
Dept: ENDOSCOPY | Facility: HOSPITAL | Age: 59
End: 2024-06-06
Payer: COMMERCIAL

## 2024-06-06 VITALS
DIASTOLIC BLOOD PRESSURE: 80 MMHG | BODY MASS INDEX: 32.32 KG/M2 | RESPIRATION RATE: 11 BRPM | SYSTOLIC BLOOD PRESSURE: 113 MMHG | HEART RATE: 76 BPM | OXYGEN SATURATION: 99 % | WEIGHT: 194 LBS | HEIGHT: 65 IN

## 2024-06-06 DIAGNOSIS — R10.9 ABDOMINAL PAIN, UNSPECIFIED ABDOMINAL LOCATION: ICD-10-CM

## 2024-06-06 PROCEDURE — 0DB68ZX EXCISION OF STOMACH, VIA NATURAL OR ARTIFICIAL OPENING ENDOSCOPIC, DIAGNOSTIC: ICD-10-PCS | Performed by: INTERNAL MEDICINE

## 2024-06-06 PROCEDURE — 43239 EGD BIOPSY SINGLE/MULTIPLE: CPT | Performed by: INTERNAL MEDICINE

## 2024-06-06 RX ORDER — SODIUM CHLORIDE, SODIUM LACTATE, POTASSIUM CHLORIDE, CALCIUM CHLORIDE 600; 310; 30; 20 MG/100ML; MG/100ML; MG/100ML; MG/100ML
INJECTION, SOLUTION INTRAVENOUS CONTINUOUS
Status: DISCONTINUED | OUTPATIENT
Start: 2024-06-06 | End: 2024-06-06

## 2024-06-06 RX ORDER — NALOXONE HYDROCHLORIDE 0.4 MG/ML
0.08 INJECTION, SOLUTION INTRAMUSCULAR; INTRAVENOUS; SUBCUTANEOUS ONCE AS NEEDED
Status: DISCONTINUED | OUTPATIENT
Start: 2024-06-06 | End: 2024-06-06

## 2024-06-06 RX ORDER — LIDOCAINE HYDROCHLORIDE 10 MG/ML
INJECTION, SOLUTION EPIDURAL; INFILTRATION; INTRACAUDAL; PERINEURAL AS NEEDED
Status: DISCONTINUED | OUTPATIENT
Start: 2024-06-06 | End: 2024-06-06 | Stop reason: SURG

## 2024-06-06 RX ADMIN — LIDOCAINE HYDROCHLORIDE 50 MG: 10 INJECTION, SOLUTION EPIDURAL; INFILTRATION; INTRACAUDAL; PERINEURAL at 07:46:00

## 2024-06-06 RX ADMIN — SODIUM CHLORIDE, SODIUM LACTATE, POTASSIUM CHLORIDE, CALCIUM CHLORIDE: 600; 310; 30; 20 INJECTION, SOLUTION INTRAVENOUS at 07:43:00

## 2024-06-06 RX ADMIN — SODIUM CHLORIDE, SODIUM LACTATE, POTASSIUM CHLORIDE, CALCIUM CHLORIDE: 600; 310; 30; 20 INJECTION, SOLUTION INTRAVENOUS at 07:55:00

## 2024-06-06 NOTE — H&P
History & Physical Examination    Patient Name: Angi Sarkar  MRN: [id number]  [facility]: [id number]  YOB: 1965    Diagnosis:   Abdominal pain    Medications Prior to Admission   Medication Sig Dispense Refill Last Dose    amLODIPine 10 MG Oral Tab Take 1 tablet (10 mg total) by mouth daily. 90 tablet 3 6/6 at 0500    traMADol 50 MG Oral Tab Take 1 tablet (50 mg total) by mouth every 6 (six) hours as needed for Pain. 30 tablet 0  at prn    Zolpidem Tartrate ER 12.5 MG Oral Tab CR Take 1 tablet (12.5 mg total) by mouth nightly as needed for Sleep. 30 tablet 0  at prn    lansoprazole 30 MG Oral Capsule Delayed Release Take 1 capsule (30 mg total) by mouth before breakfast. 90 capsule 1 6/5    hydroCHLOROthiazide 12.5 MG Oral Tab Take 1 tablet (12.5 mg total) by mouth daily. 90 tablet 3 6/5 at 0500    atorvastatin 40 MG Oral Tab Take 1 tablet (40 mg total) by mouth nightly. 90 tablet 1 6/5    cetirizine 10 MG Oral Tab Take 1 tablet (10 mg total) by mouth daily. 90 tablet 3  at prn    dicyclomine 10 MG Oral Cap Take 1 capsule (10 mg total) by mouth 3 (three) times daily as needed. 30 capsule 1     Multiple Vitamins-Minerals (CENTRUM) Oral Liquid Take  by mouth.   5/31    aspirin 81 MG Oral Chew Tab Chew 1 tablet (81 mg total) by mouth daily.   6/6 at 0500    triamcinolone 0.1 % External Cream Apply topically 2 (two) times daily. - Topical 80 g 3     HYDROcodone-acetaminophen 5-325 MG Oral Tab Take 1 tablet by mouth every 6 (six) hours as needed for Pain. (Patient not taking: Reported on 2/24/2024) 30 tablet 0     methylPREDNISolone (MEDROL) 4 MG Oral Tablet Therapy Pack Dosepack: take as directed (Patient not taking: Reported on 2/24/2024) 1 each 0      Current Facility-Administered Medications   Medication Dose Route Frequency    lactated ringers infusion   Intravenous Continuous       Allergies:   Allergies   Allergen Reactions    Morphine HIVES    Toradol [Ketorolac Tromethamine] OTHER (SEE  COMMENTS)     Upset stomach per pt       Past Medical History:    Allergy    seasonal allergies    BRCA negative    done at [facility]    Calculus of kidney    Colon polyp    Diverticular disease    Can’t remember… Found out 3 years ago    Esophageal reflux    Fibroid, uterine    fibroids [women's health].  2001 total abdominal hysterectomy    Gastritis    High blood pressure    High cholesterol    Hyperlipidemia    IBS (irritable bowel syndrome)    Can’t remember    Left leg injury    severe lt leg injury/laceration.  surgical repair    Migraine headache    migraine headaches    Migraines    PONV (postoperative nausea and vomiting)    Right nephrolithiasis    \"right right kidney stones\"  June 2013    Visual impairment    Reading glasses     Past Surgical History:   Procedure Laterality Date    Cholecystectomy      Colonoscopy N/A 08/28/2020    Procedure: COLONOSCOPY;  Surgeon: Eren Dickinson MD;  Location: [facility] ENDOSCOPY    Foot surgery Right 2014    [facility] supracervical abdominal hysterectomy  2001    fibroids, still has cervix and ovaries    Hemorrhoidectomy  06/16/2015    PPH hemorrhoidectomy with rectal mucosal proctopexy    Hemorrhoidectomy,int/ext,simple      Can’t remember    Leg/ankle surgery proc unlisted Left 1970    surgical repair, severe lt leg injury/laceration    Myomectomy 5/> intramural myomas &/or total wt >250 gms, abdominal approach  1997    Removal gallbladder      Removal of ovarian cyst(s)      Total abdom hysterectomy      Partial in 2021    Upper gi endoscopy,biopsy  08/28/2020     Family History   Problem Relation Age of Onset    Hypertension Father     Hypertension Mother     Breast Cancer Mother 52    Prostate Cancer Maternal Grandfather 60    Colon Cancer Maternal Grandfather     Cancer Maternal Grandfather     Colon Polyps Maternal Grandfather     Breast Cancer Paternal Grandmother         70's    Cancer Paternal Grandmother     Prostate Cancer Paternal Grandfather      Colon Cancer Paternal Grandfather     Breast Cancer Sister 45    Ovarian Cancer Neg      Social History     Tobacco Use    Smoking status: Never    Smokeless tobacco: Never    Tobacco comments:     Don't Smoke   Substance Use Topics    Alcohol use: No       SYSTEM Check if Review is Normal Check if Physical Exam is Normal If not normal, please explain:   HEENT [x ] [ x]    NECK & BACK [x ] [x ]    HEART [x ] [ x]    LUNGS [x ] [ x]    ABDOMEN [x ] [x ]    UROGENITAL [ ] [ ]    EXTREMITIES [x ] [x ]    OTHER        [ x ] I have discussed the risks and benefits and alternatives with the patient/family.  They understand and agree to proceed with plan of care.  [ x ] I have reviewed the History and Physical done within the last 30 days.  Any changes noted above.    Eren Dickinson MD  6/6/2024  7:46 AM

## 2024-06-06 NOTE — DISCHARGE INSTRUCTIONS
Home Care Instructions for Gastroscopy with Sedation    Diet:  - Resume your regular diet as tolerated unless otherwise instructed.  - Start with light meals to minimize bloating.  - Do not drink alcohol today.    Medication:  - If you have questions about resuming your normal medications, please contact your Primary Care Physician.    Activities:  - Take it easy today. Do not return to work today.  - Do not drive today.  - Do not operate any machinery today (including kitchen equipment).    Gastroscopy:  - You may have a sore throat for 2-3 days following the exam. This is normal. Gargling with warm salt water (1/2 tsp salt to 1 glass warm water) or using throat lozenges will help.  - If you experience any sharp pain in your neck, abdomen or chest, vomiting of blood, oral temperature over 100 degrees Fahrenheit, light-headedness or dizziness, or any other problems, contact your doctor.    **If unable to reach your doctor, please go to the [facility] Emergency Room**    - Your referring physician will receive a full report of your examination.  - If you do not hear from your doctor's office within two weeks of your biopsy, please call them for your results.    You may be able to see your laboratory results in [employer] between 4 and 7 business days.  In some cases, your physician may not have viewed the results before they are released to [employer].  If you have questions regarding your results contact the physician who ordered the test/exam by phone or via [employer] by choosing \"Ask a Medical Question.\"

## 2024-06-06 NOTE — ANESTHESIA POSTPROCEDURE EVALUATION
Patient: Angi Galicia Antonina    Procedure Summary       Date: 06/06/24 Room / Location: [facility] ENDOSCOPY 04 / [facility] ENDOSCOPY    Anesthesia Start: 0743 Anesthesia Stop:     Procedure: ESOPHAGOGASTRODUODENOSCOPY Diagnosis:       Abdominal pain, unspecified abdominal location      (gastritis, hiatal hernia, gastric ulcers x 3)    Surgeons: Eren Dickinson MD Anesthesiologist: Beti Bourne MD    Anesthesia Type: MAC ASA Status: 2            Anesthesia Type: MAC    Vitals Value Taken Time   /63 06/06/24 0755   Temp  06/06/24 0756   Pulse 81 06/06/24 0755   Resp 18 06/06/24 0755   SpO2 95 % 06/06/24 0755       [facility] AN Post Evaluation:   Patient Evaluated in PACU  Patient Participation: complete - patient participated  Level of Consciousness: awake  Pain Management: adequate  Airway Patency:patent  Dental exam unchanged from preop  Yes    Cardiovascular Status: acceptable  Respiratory Status: acceptable  Postoperative Hydration acceptable      BETI BOURNE MD  6/6/2024 7:56 AM

## 2024-06-06 NOTE — OPERATIVE REPORT
[facility] Endoscopy Report  Date of procedure-June 6, 2024    Preoperative Diagnosis:  -Abdominal pain      Postoperative Diagnosis:  -Hiatal hernia 2 cm  -Gastritis  -Superficial gastric ulcers x 3      Procedure:    Esophagogastroduodenoscopy       Surgeon:  Eren Dickinson M.D.    Anesthesia:  MAC     Technique:  After informed consent, the patient was placed in the left lateral recumbent position.  An Olympus adult HD gastroscope was inserted into the hypopharynx and advanced under direct vision into the esophagus, stomach and duodenum.  The endoscope was withdrawn to the stomach where retroflexion of the annulus, body, cardia and fundus was performed.  The instrument was straightened, insufflated air and fluid were suctioned and the endoscope was withdrawn.  The procedures were well tolerated without immediate complication.      Findings:  The esophagus was normal.  The GE junction and diaphragmatic impression were at 38 and 40 cm for a 2 cm hiatal hernia.  The stomach distended appropriately with insufflated air.  The mucosa of the stomach showed subtle gastric erythema throughout the antrum and body the stomach, 3 small superficial ulcerations noted in the antral region, each of these were about 4 mm or so in size, biopsies taken from the edge of these.  The duodenal bulb and post bulbar regions were normal.    Estimated blood loss-insignificant  Specimens-see above    Impression:  -Hiatal hernia 2 cm  -Gastritis  -Superficial gastric ulcers x 3    Recommendations:  - Post procedure instructions given  - Follow up on biopsies  - Continue on PPI         Eren Dickinson MD  6/6/2024  7:53 AM

## 2024-06-06 NOTE — ANESTHESIA PREPROCEDURE EVALUATION
Anesthesia PreOp Note    HPI:     Angi Sarkar is a 58 year old female who presents for preoperative consultation requested by: Eren Dickinson MD    Date of Surgery: 6/6/2024    Procedure(s):  ESOPHAGOGASTRODUODENOSCOPY  Indication: Abdominal pain, unspecified abdominal location    Relevant Problems   No relevant active problems       NPO:  Last Liquid Consumption Date: 06/06/24  Last Liquid Consumption Time: 0545  Last Solid Consumption Date: 06/04/24  Last Solid Consumption Time: 1900  Last Liquid Consumption Date: 06/06/24          History Review:  Patient Active Problem List    Diagnosis Date Noted    Postoperative examination 06/27/2023    Adhesion of abdominal wall     Biliary colic 06/13/2023    BRCA negative 04/05/2021    Medication side effect, initial encounter 09/19/2020    Edema of both lower legs 06/16/2020    History of colon polyps 06/16/2020    Bacterial vaginosis 06/16/2020    Vasomotor rhinitis 12/22/2019    Essential hypertension 12/05/2019    Migraine without aura 06/22/2019    Hyperlipidemia 09/29/2014       Past Medical History:    Allergy    seasonal allergies    BRCA negative    done at [facility]    Calculus of kidney    Colon polyp    Diverticular disease    Can’t remember… Found out 3 years ago    Esophageal reflux    Fibroid, uterine    fibroids [women's health].  2001 total abdominal hysterectomy    Gastritis    High blood pressure    High cholesterol    Hyperlipidemia    IBS (irritable bowel syndrome)    Can’t remember    Left leg injury    severe lt leg injury/laceration.  surgical repair    Migraine headache    migraine headaches    Migraines    PONV (postoperative nausea and vomiting)    Right nephrolithiasis    \"right right kidney stones\"  June 2013    Visual impairment    Reading glasses       Past Surgical History:   Procedure Laterality Date    Cholecystectomy      Colonoscopy N/A 08/28/2020    Procedure: COLONOSCOPY;  Surgeon: Eren Dickinson MD;  Location: [facility]  ENDOSCOPY    Foot surgery Right 2014    [facility] supracervical abdominal hysterectomy  2001    fibroids, still has cervix and ovaries    Hemorrhoidectomy  06/16/2015    PPH hemorrhoidectomy with rectal mucosal proctopexy    Hemorrhoidectomy,int/ext,simple      Can’t remember    Leg/ankle surgery proc unlisted Left 1970    surgical repair, severe lt leg injury/laceration    Myomectomy 5/> intramural myomas &/or total wt >250 gms, abdominal approach  1997    Removal gallbladder      Removal of ovarian cyst(s)      Total abdom hysterectomy      Partial in 2021    Upper gi endoscopy,biopsy  08/28/2020       Medications Prior to Admission   Medication Sig Dispense Refill Last Dose    amLODIPine 10 MG Oral Tab Take 1 tablet (10 mg total) by mouth daily. 90 tablet 3 6/6 at 0500    traMADol 50 MG Oral Tab Take 1 tablet (50 mg total) by mouth every 6 (six) hours as needed for Pain. 30 tablet 0  at prn    Zolpidem Tartrate ER 12.5 MG Oral Tab CR Take 1 tablet (12.5 mg total) by mouth nightly as needed for Sleep. 30 tablet 0  at prn    lansoprazole 30 MG Oral Capsule Delayed Release Take 1 capsule (30 mg total) by mouth before breakfast. 90 capsule 1 6/5    hydroCHLOROthiazide 12.5 MG Oral Tab Take 1 tablet (12.5 mg total) by mouth daily. 90 tablet 3 6/5 at 0500    atorvastatin 40 MG Oral Tab Take 1 tablet (40 mg total) by mouth nightly. 90 tablet 1     cetirizine 10 MG Oral Tab Take 1 tablet (10 mg total) by mouth daily. 90 tablet 3  at prn    dicyclomine 10 MG Oral Cap Take 1 capsule (10 mg total) by mouth 3 (three) times daily as needed. 30 capsule 1     Multiple Vitamins-Minerals (CENTRUM) Oral Liquid Take  by mouth.       aspirin 81 MG Oral Chew Tab Chew 1 tablet (81 mg total) by mouth daily.   6/6 at 0500    triamcinolone 0.1 % External Cream Apply topically 2 (two) times daily. - Topical 80 g 3     HYDROcodone-acetaminophen 5-325 MG Oral Tab Take 1 tablet by mouth every 6 (six) hours as needed for Pain. (Patient  not taking: Reported on 2/24/2024) 30 tablet 0     methylPREDNISolone (MEDROL) 4 MG Oral Tablet Therapy Pack Dosepack: take as directed (Patient not taking: Reported on 2/24/2024) 1 each 0      Current Facility-Administered Medications Ordered in Epic   Medication Dose Route Frequency Provider Last Rate Last Admin    lactated ringers infusion   Intravenous Continuous Eren Dickinson MD         No current [facility]-ordered outpatient medications on file.       Allergies   Allergen Reactions    Morphine HIVES    Toradol [Ketorolac Tromethamine] OTHER (SEE COMMENTS)     Upset stomach per pt       Family History   Problem Relation Age of Onset    Hypertension Father     Hypertension Mother     Breast Cancer Mother 52    Prostate Cancer Maternal Grandfather 60    Colon Cancer Maternal Grandfather     Cancer Maternal Grandfather     Colon Polyps Maternal Grandfather     Breast Cancer Paternal Grandmother         70's    Cancer Paternal Grandmother     Prostate Cancer Paternal Grandfather     Colon Cancer Paternal Grandfather     Breast Cancer Sister 45    Ovarian Cancer Neg      Social History     Socioeconomic History    Marital status:     Number of children: 3   Occupational History    Occupation: Collections   Tobacco Use    Smoking status: Never    Smokeless tobacco: Never    Tobacco comments:     Don't Smoke   Vaping Use    Vaping status: Never Used   Substance and Sexual Activity    Alcohol use: No    Drug use: No    Sexual activity: Yes     Birth control/protection: Hysterectomy     Comment: supracervical  hysterectomy   Other Topics Concern    Caffeine Concern No       Available pre-op labs reviewed.             Vital Signs:  Body mass index is 32.28 kg/m².   height is 1.651 m (5' 5\") and weight is 88 kg (194 lb). Her blood pressure is 123/87 and her pulse is 78. Her respiration is 13 and oxygen saturation is 97%.   Vitals:    06/01/24 1055 06/06/24 0709   BP:  123/87   Pulse:  78   Resp:  13   TempSrc:  Oral    SpO2:  97%   Weight: 88 kg (194 lb)    Height: 1.651 m (5' 5\")         Anesthesia Evaluation      History of anesthetic complications   Airway   Mallampati: I  TM distance: >3 FB  Neck ROM: full  Dental      Pulmonary - normal exam   Cardiovascular - normal exam  (+) hypertension    Neuro/Psych    (+)  headaches,        GI/Hepatic/Renal    (+) GERD    Endo/Other    Abdominal   (+) obese                 Anesthesia Plan:   ASA:  2  Plan:   MAC  Informed Consent Plan and Risks Discussed With:  Patient      I have informed Angi Sarkar and/or legal guardian or family member of the nature of the anesthetic plan, benefits, risks including possible dental damage if relevant, major complications, and any alternative forms of anesthetic management.   All of the patient's questions were answered to the best of my ability. The patient desires the anesthetic management as planned.  BETI MIMS MD  6/6/2024 7:13 AM  Present on Admission:  **None**

## 2024-06-22 DIAGNOSIS — G43.001 MIGRAINE WITHOUT AURA AND WITH STATUS MIGRAINOSUS, NOT INTRACTABLE: ICD-10-CM

## 2024-06-24 DIAGNOSIS — F51.05 INSOMNIA SECONDARY TO DEPRESSION WITH ANXIETY: ICD-10-CM

## 2024-06-24 DIAGNOSIS — G43.001 MIGRAINE WITHOUT AURA AND WITH STATUS MIGRAINOSUS, NOT INTRACTABLE: ICD-10-CM

## 2024-06-24 DIAGNOSIS — F41.8 INSOMNIA SECONDARY TO DEPRESSION WITH ANXIETY: ICD-10-CM

## 2024-06-25 RX ORDER — TRAMADOL HYDROCHLORIDE 50 MG/1
50 TABLET ORAL EVERY 6 HOURS PRN
Qty: 30 TABLET | Refills: 0 | Status: SHIPPED | OUTPATIENT
Start: 2024-06-25

## 2024-06-25 NOTE — TELEPHONE ENCOUNTER
Please Review. Protocol Failed; No Protocol     Recent fills: Quantity: 30  05/09/2024 04/27/2024 02/24/2024                                                                    Patient is due  Last Rx written: 05/09/2024  Last Office Visit: 05/09/2024        Requested Prescriptions   Pending Prescriptions Disp Refills    TRAMADOL 50 MG Oral Tab [Pharmacy Med Name: TRAMADOL 50MG TABLETS] 30 tablet 0     Sig: TAKE 1 TABLET(50 MG) BY MOUTH EVERY 6 HOURS AS NEEDED FOR PAIN       Controlled Substance Medication Failed - 6/22/2024  2:26 PM        Failed - This medication is a controlled substance - forward to provider to refill                 Recent Outpatient Visits              1 month ago Melena    [facility], [facility] Gera Dey, DO    Office Visit    4 months ago Encounter for annual routine gynecological examination    [facility], [facility] Weiler, Colleen M, DO    Office Visit    5 months ago Gluteal pain    [facility], [facility] Weiler, Colleen M, DO    Office Visit    12 months ago [facility]    [facility]    1 year ago [facility] colic    [facility]    [facility] Visit

## 2024-06-26 RX ORDER — TRAMADOL HYDROCHLORIDE 50 MG/1
50 TABLET ORAL EVERY 6 HOURS PRN
Qty: 30 TABLET | Refills: 0 | OUTPATIENT
Start: 2024-06-26

## 2024-06-26 RX ORDER — ZOLPIDEM TARTRATE 12.5 MG/1
12.5 TABLET, FILM COATED, EXTENDED RELEASE ORAL NIGHTLY PRN
Qty: 30 TABLET | Refills: 0 | Status: SHIPPED | OUTPATIENT
Start: 2024-06-26

## 2024-06-26 NOTE — TELEPHONE ENCOUNTER
Please review; protocol failed/ has no protocol      Recent fills: 05/09/2024,02/24/2024  Last Rx written: 05/09/2024  Last Office Visit : 05/09/2024    Recent Visits  Date Type Provider Dept   05/09/24 [facility]       Requested Prescriptions   Pending Prescriptions Disp Refills    Zolpidem Tartrate ER 12.5 MG Oral Tab CR 30 tablet 0     Sig: Take 1 tablet (12.5 mg total) by mouth nightly as needed for Sleep.       Controlled Substance Medication Failed - 6/24/2024  1:56 PM        Failed - This medication is a controlled substance - forward to provider to refill         Refused Prescriptions Disp Refills    traMADol 50 MG Oral Tab 30 tablet 0     Sig: Take 1 tablet (50 mg total) by mouth every 6 (six) hours as needed for Pain.       Controlled Substance Medication Failed - 6/24/2024  1:56 PM        Failed - This medication is a controlled substance - forward to provider to refill           Recent Outpatient Visits              1 month ago Melena    [facility] Gera Dey,     Office Visit    4 months ago Encounter for annual routine gynecological examination    [facility] Weiler, Colleen M,     Office Visit    5 months ago Gluteal pain    [facility] Weiler, Colleen M,     Office Visit    1 year [facility]    [facility]    1 year ago [facility] colic    [facility]    [facility] Visit

## 2024-08-03 DIAGNOSIS — G43.001 MIGRAINE WITHOUT AURA AND WITH STATUS MIGRAINOSUS, NOT INTRACTABLE: ICD-10-CM

## 2024-08-06 NOTE — TELEPHONE ENCOUNTER
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 Can you please contact me on Monday?     Thanks
Patient had the oncall provider paged and was provided with antibiotic for her sore throat.
RN on site to please call patient. Patient currently r/o covid19. Results still pending. Response sent to patient via [employer] stating staff will call her Monday or she may call office directly.
Verified name and . Patient paged on-call provider on 20 in regards to sore throat.   On-call provider ordered the following medication for patient:    Authorizing Provider Encounter Provider   MD Lillian Wilson MD   Medicat
No

## 2024-08-07 NOTE — TELEPHONE ENCOUNTER
Medication(s) to Refill:     Requested Prescriptions     Pending Prescriptions Disp Refills    TRAMADOL 50 MG Oral Tab [Pharmacy Med Name: TRAMADOL 50MG TABLETS] 30 tablet 0     Sig: TAKE 1 TABLET(50 MG) BY MOUTH EVERY 6 HOURS AS NEEDED FOR PAIN         Reason for Medication Refill being sent to Provider / Reason Protocol Failed:  [x] Controlled Substance       Quantity:   Last Time Medication was Filled:  05/09/2024, 04/27/2024, 02/24/2024  Last Time Medication was Written : 05/09/2024       Last Office Visit : 05/09/2024      Last Blood Pressures:  BP Readings from Last 2 Encounters:   06/06/24 113/80   05/09/24 112/80         Requested Prescriptions   Pending Prescriptions Disp Refills    TRAMADOL 50 MG Oral Tab [Pharmacy Med Name: TRAMADOL 50MG TABLETS] 30 tablet 0     Sig: TAKE 1 TABLET(50 MG) BY MOUTH EVERY 6 HOURS AS NEEDED FOR PAIN       Controlled Substance Medication Failed - 8/3/2024  3:48 PM        Failed - This medication is a controlled substance - forward to provider to refill                 Recent Outpatient Visits              3 months ago Melena    [facility], [facility] Gera Dey, DO    Office Visit    5 months ago Encounter for annual routine gynecological examination    [facility] Weiler, Colleen M, DO    Office Visit    7 months ago Gluteal pain    [facility] Weiler, Colleen M, DO    Office Visit    [facility]    [facility]    1 year ago [facility] colic    [facility]    [facility] Visit

## 2024-08-08 DIAGNOSIS — G43.001 MIGRAINE WITHOUT AURA AND WITH STATUS MIGRAINOSUS, NOT INTRACTABLE: ICD-10-CM

## 2024-08-09 RX ORDER — TRAMADOL HYDROCHLORIDE 50 MG/1
50 TABLET ORAL EVERY 6 HOURS PRN
Qty: 30 TABLET | Refills: 0 | Status: SHIPPED | OUTPATIENT
Start: 2024-08-09

## 2024-08-12 RX ORDER — AMLODIPINE BESYLATE 10 MG/1
10 TABLET ORAL DAILY
Qty: 90 TABLET | Refills: 0 | Status: SHIPPED | OUTPATIENT
Start: 2024-08-12

## 2024-08-12 RX ORDER — TRAMADOL HYDROCHLORIDE 50 MG/1
50 TABLET ORAL EVERY 6 HOURS PRN
Qty: 30 TABLET | Refills: 0 | OUTPATIENT
Start: 2024-08-12

## 2024-08-12 NOTE — TELEPHONE ENCOUNTER
Please review; protocol failed/ has no protocol      Message sent for patient to have labs done.    Requested Prescriptions   Pending Prescriptions Disp Refills    amLODIPine 10 MG Oral Tab 90 tablet 3     Sig: Take 1 tablet (10 mg total) by mouth daily.       Hypertension Medications Protocol Failed - 8/8/2024  7:52 AM        Failed - CMP or BMP in past 12 months        Failed - EGFRCR or GFRAA > 50     GFR Evaluation            Passed - Last BP reading less than 140/90     BP Readings from Last 1 Encounters:   06/06/24 113/80               Passed - In person appointment or virtual visit in the past 12 mos or appointment in next 3 mos     Recent Outpatient Visits              3 months ago [facility] [place]    Office Visit    5 months ago Encounter for annual routine gynecological examination    [facility], [place] Weiler, Colleen M, DO    Office Visit    7 months ago Gluteal pain    [facility] [place] Weiler, Colleen M, DO    Office Visit    1 year ago [facility]    [facility]    1 year ago [facility] col[facility]                       Refused Prescriptions Disp Refills    traMADol 50 MG Oral Tab 30 tablet 0     Sig: Take 1 tablet (50 mg total) by mouth every 6 (six) hours as needed for Pain.       Controlled Substance Medication Failed - 8/8/2024  7:52 AM        Failed - This medication is a controlled substance - forward to provider to refill           Recent Outpatient Visits              3 months ago [facility] [place]    Office Visit    5 months ago Encounter for annual routine gynecological examination    [facility],  [place] Weiler, Colleen M, DO    Office Visit    7 months ago Gluteal pain    [facility], [place] Weiler, Colleen M, DO    Office Visit    1 year ago Postoperative examination    [facility], Nikhil Gama MD    Office Visit    1 year ago Biliary colic    [facility], Nikhil Gama MD    Office Visit

## 2024-10-01 ENCOUNTER — TELEPHONE (OUTPATIENT)
Facility: CLINIC | Age: 59
End: 2024-10-01

## 2024-10-01 RX ORDER — LANSOPRAZOLE 30 MG/1
30 CAPSULE, DELAYED RELEASE ORAL
Qty: 90 CAPSULE | Refills: 0 | Status: SHIPPED | OUTPATIENT
Start: 2024-10-01

## 2024-10-01 NOTE — TELEPHONE ENCOUNTER
Current Outpatient Medications   Medication Sig Dispense Refill    lansoprazole 30 MG Oral Capsule Delayed Release Take 1 capsule (30 mg total) by mouth before breakfast. 90 capsule 1

## 2024-10-01 NOTE — TELEPHONE ENCOUNTER
PPI (Proton Pump Inhibitors): Nexium (Esomeprazole), Protonix (Pantoprazole), Dexilant (Dexlansoprazole), Prevacid (Lansoprazole), Aciphex (Rabeprazole), Omperazole    Refilled per office protocol    Protocol Criteria  Review last office visit note(s), plan of care, for any change     Appointment scheduled within the past 12 months or in the next 3 months or had a procedure (if applicable) and is up to date with their recall     Date of Last Office Visit: 06/08/2023    Date of next scheduled appointment: no future appointment noted in system    Date of last procedure (if applicable):EGD 06/06/2024    Date of recall (if applicable): 08/28/2027

## 2024-10-21 DIAGNOSIS — L20.9 ATOPIC DERMATITIS, UNSPECIFIED TYPE: ICD-10-CM

## 2024-10-21 DIAGNOSIS — F41.8 INSOMNIA SECONDARY TO DEPRESSION WITH ANXIETY: ICD-10-CM

## 2024-10-21 DIAGNOSIS — G43.001 MIGRAINE WITHOUT AURA AND WITH STATUS MIGRAINOSUS, NOT INTRACTABLE: ICD-10-CM

## 2024-10-21 DIAGNOSIS — F51.05 INSOMNIA SECONDARY TO DEPRESSION WITH ANXIETY: ICD-10-CM

## 2024-10-22 RX ORDER — LANSOPRAZOLE 30 MG/1
30 CAPSULE, DELAYED RELEASE ORAL
Qty: 90 CAPSULE | Refills: 0 | Status: SHIPPED | OUTPATIENT
Start: 2024-10-22

## 2024-10-22 NOTE — TELEPHONE ENCOUNTER
Requested Prescriptions     Pending Prescriptions Disp Refills    lansoprazole 30 MG Oral Capsule Delayed Release 90 capsule 0     Sig: Take 1 capsule (30 mg total) by mouth before breakfast.         LOV, procedure 6/6/2024      LR   10/1/2024      AL

## 2024-10-23 RX ORDER — ZOLPIDEM TARTRATE 12.5 MG/1
12.5 TABLET, FILM COATED, EXTENDED RELEASE ORAL NIGHTLY PRN
Qty: 30 TABLET | Refills: 0 | Status: SHIPPED | OUTPATIENT
Start: 2024-10-23

## 2024-10-23 RX ORDER — TRIAMCINOLONE ACETONIDE 1 MG/G
CREAM TOPICAL
Qty: 80 G | Refills: 3 | Status: SHIPPED | OUTPATIENT
Start: 2024-10-23

## 2024-10-23 RX ORDER — TRAMADOL HYDROCHLORIDE 50 MG/1
50 TABLET ORAL EVERY 6 HOURS PRN
Qty: 30 TABLET | Refills: 0 | Status: SHIPPED | OUTPATIENT
Start: 2024-10-23

## 2024-10-23 NOTE — TELEPHONE ENCOUNTER
Please review. Protocol Failed; No Protocol    Requested Prescriptions   Pending Prescriptions Disp Refills    triamcinolone 0.1 % External Cream 80 g 3     Sig: Apply topically 2 (two) times daily. - Topical       There is no refill protocol information for this order              Recent Outpatient Visits              5 months ago Melena    [facility], [facility] Gera Dey, DO    Office Visit    8 months ago Encounter for annual routine gynecological examination    [facility] Weiler, Colleen M, DO    Office Visit    9 months ago Gluteal pain    [facility] Weiler, Colleen M, DO    Office Visit    1 year ago [facility]operative [facility]    [facility]    1 year ago Biliary colic    [facility]    [facility] Visit

## 2024-10-23 NOTE — TELEPHONE ENCOUNTER
Please review; protocol failed/No Protocol    Recent Fills:   Tramadol: 05/09/2024, 06/25/2024, 08/09/2024  Zolpidem: 05/09/2024, 06/27/2024    Last Rx Written:   Tramadol: 08/09/2024  Zolpidem: 06/26/2024    Last Office Visit: 05/09/2024    Requested Prescriptions   Pending Prescriptions Disp Refills    Zolpidem Tartrate ER 12.5 MG Oral Tab CR 30 tablet 0     Sig: Take 1 tablet (12.5 mg total) by mouth nightly as needed for Sleep.       Controlled Substance Medication Failed - 10/23/2024  1:01 PM        Failed - This medication is a controlled substance - forward to provider to refill          traMADol 50 MG Oral Tab 30 tablet 0     Sig: Take 1 tablet (50 mg total) by mouth every 6 (six) hours as needed for Pain.       Controlled Substance Medication Failed - 10/23/2024  1:01 PM        Failed - This medication is a controlled substance - forward to provider to refill             Recent Outpatient Visits              5 months ago Melena    [facility], [facility] Gera Dey, DO    Office Visit    8 months ago Encounter for annual routine gynecological examination    [facility] [place] Weiler, Colleen M, DO    Office Visit    9 months ago Gluteal pain    [facility], [facility] [place] Weiler, Colleen M, DO    Office Visit    1 year ago [facility]    [facility]    1 year ago [facility] colic    [facility]    [facility]

## 2024-11-18 ENCOUNTER — NURSE TRIAGE (OUTPATIENT)
Dept: FAMILY MEDICINE CLINIC | Facility: CLINIC | Age: 59
End: 2024-11-18

## 2024-11-18 NOTE — TELEPHONE ENCOUNTER
Action Requested: Summary for Provider     []  Critical Lab, Recommendations Needed  [x] Need Additional Advice  []   FYI    []   Need Orders  [] Need Medications Sent to Pharmacy  []  Other     SUMMARY: Feels like Menstrual Cramps. Now happening Daily.   12/5/24 appointment scheduled -- however asking for sooner. And Per Triage, patient should be seen today.   Only wants to see Dr Weiler --- Dr Weiler -- okay to add to schedule 11/20/24 (morning Preferred)    Or Okay to use Res24 11/27/24 10:00am ?     Reason for call: Acute  Onset: per patient - this has been happening since February    Patient called office. Date of birth and full name both confirmed.   Intermittent cramping initially.   But in the past month, daily cramping  And with sex and orgasm, feels like her ovary is cramping.     Denies vaginal discharge.   Denies changes in urination and stools.   Denies severe pain.     Triaged and advised care advice     Evaluation advised today , only wants to see Dr Weiler.     Advised to monitor symptoms.  RN advised if symptoms get severely worse, patient should seek care at Emergency Room or Immediate Care.  RN also informed patient to seek immediate medical attention at ER if patient experiences severe/worsening symptoms, shortness of breath, chest pain, or severe pain.  Patient verbalizes understanding and is agreeable to instructions.         Future Appointments   Date Time Provider Department Center   12/5/2024 11:00 AM Weiler, Colleen M, DO [facility]       Reason for Disposition   Patient wants to be seen    Protocols used: Abdominal Pain - Menstrual Cramps-A-[facility]

## 2024-11-18 NOTE — TELEPHONE ENCOUNTER
Advised patient of Dr Weiler's note. Patient verbalized understanding. Appointment made.       Future Appointments   Date Time Provider Department Center   11/27/2024 10:00 AM Weiler, Colleen M,  [facility]   12/5/2024 11:00 AM Weiler, Colleen M,  [facility]

## 2024-12-07 ENCOUNTER — LAB ENCOUNTER (OUTPATIENT)
Dept: LAB | Facility: HOSPITAL | Age: 59
End: 2024-12-07
Attending: FAMILY MEDICINE
Payer: COMMERCIAL

## 2024-12-07 ENCOUNTER — OFFICE VISIT (OUTPATIENT)
Dept: OBGYN CLINIC | Facility: CLINIC | Age: 59
End: 2024-12-07
Payer: COMMERCIAL

## 2024-12-07 VITALS
SYSTOLIC BLOOD PRESSURE: 136 MMHG | BODY MASS INDEX: 34 KG/M2 | WEIGHT: 206 LBS | HEART RATE: 67 BPM | DIASTOLIC BLOOD PRESSURE: 85 MMHG

## 2024-12-07 DIAGNOSIS — Z90.711 HISTORY OF ABDOMINAL SUPRACERVICAL SUBTOTAL HYSTERECTOMY: ICD-10-CM

## 2024-12-07 DIAGNOSIS — Z13.220 LIPID SCREENING: ICD-10-CM

## 2024-12-07 DIAGNOSIS — Z11.3 SCREENING EXAMINATION FOR STI: ICD-10-CM

## 2024-12-07 DIAGNOSIS — N64.4 BREAST PAIN, LEFT: ICD-10-CM

## 2024-12-07 DIAGNOSIS — R53.83 FATIGUE, UNSPECIFIED TYPE: ICD-10-CM

## 2024-12-07 DIAGNOSIS — R10.2 PELVIC PAIN: Primary | ICD-10-CM

## 2024-12-07 DIAGNOSIS — R10.13 EPIGASTRIC PAIN: ICD-10-CM

## 2024-12-07 DIAGNOSIS — E87.8 HYPERCHLOREMIA: ICD-10-CM

## 2024-12-07 DIAGNOSIS — K92.1 MELENA: ICD-10-CM

## 2024-12-07 DIAGNOSIS — D69.6 PLATELETS DECREASED (HCC): ICD-10-CM

## 2024-12-07 DIAGNOSIS — R10.84 GENERALIZED ABDOMINAL PAIN: ICD-10-CM

## 2024-12-07 DIAGNOSIS — E78.2 HYPERLIPIDEMIA, MIXED: Primary | ICD-10-CM

## 2024-12-07 LAB
ALBUMIN SERPL-MCNC: 4.2 G/DL (ref 3.2–4.8)
ALBUMIN/GLOB SERPL: 1.8 {RATIO} (ref 1–2)
ALP LIVER SERPL-CCNC: 82 U/L
ALT SERPL-CCNC: 22 U/L
ANION GAP SERPL CALC-SCNC: 7 MMOL/L (ref 0–18)
AST SERPL-CCNC: 18 U/L (ref ?–34)
BASOPHILS # BLD AUTO: 0.04 X10(3) UL (ref 0–0.2)
BASOPHILS NFR BLD AUTO: 0.6 %
BILIRUB SERPL-MCNC: 0.6 MG/DL (ref 0.3–1.2)
BILIRUB UR QL: NEGATIVE
BUN BLD-MCNC: 12 MG/DL (ref 9–23)
BUN/CREAT SERPL: 12 (ref 10–20)
CALCIUM BLD-MCNC: 9.5 MG/DL (ref 8.7–10.4)
CHLORIDE SERPL-SCNC: 114 MMOL/L (ref 98–112)
CHOLEST SERPL-MCNC: 259 MG/DL (ref ?–200)
CLARITY UR: CLEAR
CO2 SERPL-SCNC: 24 MMOL/L (ref 21–32)
COLOR UR: YELLOW
CREAT BLD-MCNC: 1 MG/DL
DEPRECATED RDW RBC AUTO: 41.6 FL (ref 35.1–46.3)
EGFRCR SERPLBLD CKD-EPI 2021: 65 ML/MIN/1.73M2 (ref 60–?)
EOSINOPHIL # BLD AUTO: 0.15 X10(3) UL (ref 0–0.7)
EOSINOPHIL NFR BLD AUTO: 2.1 %
ERYTHROCYTE [DISTWIDTH] IN BLOOD BY AUTOMATED COUNT: 13.4 % (ref 11–15)
FASTING PATIENT LIPID ANSWER: YES
FASTING STATUS PATIENT QL REPORTED: YES
GLOBULIN PLAS-MCNC: 2.3 G/DL (ref 2–3.5)
GLUCOSE BLD-MCNC: 82 MG/DL (ref 70–99)
GLUCOSE UR-MCNC: NORMAL MG/DL
HBV SURFACE AG SER-ACNC: <0.1 [IU]/L
HBV SURFACE AG SERPL QL IA: NONREACTIVE
HCT VFR BLD AUTO: 41.7 %
HCV AB SERPL QL IA: NONREACTIVE
HDLC SERPL-MCNC: 63 MG/DL (ref 40–59)
HGB BLD-MCNC: 13.1 G/DL
HGB UR QL STRIP.AUTO: NEGATIVE
IMM GRANULOCYTES # BLD AUTO: 0.02 X10(3) UL (ref 0–1)
IMM GRANULOCYTES NFR BLD: 0.3 %
KETONES UR-MCNC: NEGATIVE MG/DL
LDLC SERPL CALC-MCNC: 181 MG/DL (ref ?–100)
LEUKOCYTE ESTERASE UR QL STRIP.AUTO: NEGATIVE
LYMPHOCYTES # BLD AUTO: 3.06 X10(3) UL (ref 1–4)
LYMPHOCYTES NFR BLD AUTO: 43.2 %
MCH RBC QN AUTO: 26.6 PG (ref 26–34)
MCHC RBC AUTO-ENTMCNC: 31.4 G/DL (ref 31–37)
MCV RBC AUTO: 84.8 FL
MONOCYTES # BLD AUTO: 0.48 X10(3) UL (ref 0.1–1)
MONOCYTES NFR BLD AUTO: 6.8 %
NEUTROPHILS # BLD AUTO: 3.34 X10 (3) UL (ref 1.5–7.7)
NEUTROPHILS # BLD AUTO: 3.34 X10(3) UL (ref 1.5–7.7)
NEUTROPHILS NFR BLD AUTO: 47 %
NITRITE UR QL STRIP.AUTO: NEGATIVE
NONHDLC SERPL-MCNC: 196 MG/DL (ref ?–130)
OSMOLALITY SERPL CALC.SUM OF ELEC: 299 MOSM/KG (ref 275–295)
PH UR: 5.5 [PH] (ref 5–8)
PLATELET # BLD AUTO: 144 10(3)UL (ref 150–450)
PLATELETS.RETICULATED NFR BLD AUTO: 17.2 % (ref 0–7)
POTASSIUM SERPL-SCNC: 4.1 MMOL/L (ref 3.5–5.1)
PROT SERPL-MCNC: 6.5 G/DL (ref 5.7–8.2)
PROT UR-MCNC: 20 MG/DL
RBC # BLD AUTO: 4.92 X10(6)UL
SODIUM SERPL-SCNC: 145 MMOL/L (ref 136–145)
SP GR UR STRIP: >1.03 (ref 1–1.03)
T PALLIDUM AB SER QL IA: NONREACTIVE
TRIGL SERPL-MCNC: 86 MG/DL (ref 30–149)
TSI SER-ACNC: 1.4 UIU/ML (ref 0.55–4.78)
UROBILINOGEN UR STRIP-ACNC: NORMAL
VLDLC SERPL CALC-MCNC: 17 MG/DL (ref 0–30)
WBC # BLD AUTO: 7.1 X10(3) UL (ref 4–11)

## 2024-12-07 PROCEDURE — 84443 ASSAY THYROID STIM HORMONE: CPT

## 2024-12-07 PROCEDURE — 3075F SYST BP GE 130 - 139MM HG: CPT | Performed by: NURSE PRACTITIONER

## 2024-12-07 PROCEDURE — 86803 HEPATITIS C AB TEST: CPT

## 2024-12-07 PROCEDURE — 87340 HEPATITIS B SURFACE AG IA: CPT

## 2024-12-07 PROCEDURE — 80061 LIPID PANEL: CPT

## 2024-12-07 PROCEDURE — 99214 OFFICE O/P EST MOD 30 MIN: CPT | Performed by: NURSE PRACTITIONER

## 2024-12-07 PROCEDURE — 3079F DIAST BP 80-89 MM HG: CPT | Performed by: NURSE PRACTITIONER

## 2024-12-07 PROCEDURE — 80053 COMPREHEN METABOLIC PANEL: CPT

## 2024-12-07 PROCEDURE — 86780 TREPONEMA PALLIDUM: CPT

## 2024-12-07 PROCEDURE — 81001 URINALYSIS AUTO W/SCOPE: CPT

## 2024-12-07 PROCEDURE — 36415 COLL VENOUS BLD VENIPUNCTURE: CPT

## 2024-12-07 PROCEDURE — 87389 HIV-1 AG W/HIV-1&-2 AB AG IA: CPT

## 2024-12-07 PROCEDURE — 85025 COMPLETE CBC W/AUTO DIFF WBC: CPT

## 2024-12-07 RX ORDER — NAPROXEN 250 MG/1
250 TABLET ORAL 2 TIMES DAILY WITH MEALS
Qty: 30 TABLET | Refills: 0 | Status: SHIPPED | OUTPATIENT
Start: 2024-12-07

## 2024-12-07 NOTE — PROGRESS NOTES
[facility]   Obstetrics and Gynecology    Angi Sarkar is a 59 year old female  No LMP recorded. (Menstrual status: Partial Hysterectomy).   Chief Complaint   Patient presents with    Gyn Problem     C/o worsening lower abdominal cramping and pain with intercourse    Refill Request     Tramadol   . Last seen in .  Pelvic ultrasound in 3/30/2024 normal ovaries  She reports her left breast feeling tender x 2 months - on on spot and when bending over or when touching. No lump. No skin changes.    Also states has minor cramping that is mild or more severe. Bilateral pelvic pain. No urinary or bowel changes.  Comes and goes. States has had this cramping for the last year. She reports when has orgasm has \"really bad contraction like cramping\". Having some dryness, but no pain with penetration or with deep in the pelvis.  States pain not helped when taking ibuprofen. She was using tramadol prescribed by PCP for pain which helps.    States has had epigastric pain and when takes flagyl symptoms better.    had a supracervical hysterectomy - she also still has her ovaries.  She had the surgery as an emergency due to hemorrhaging/fibroids. Hx of prior myomectomies prior to hysterectomy.   She is sexually active with her male partner, monogamous for 29 years. Notices epigastric pain after sexual intercourse ever since her hysterectomy. Hx of esophagitis and takes PPI. No pain or bleeding with intercourse, no abnormal discharge. States epigastric pain happens within hours of intercourse. Only thing that helps is taking flagyl pills. Desires STI testing  After hysterectomy had random episodes of spotting but not in 2 years.     She has strong family history of cancer and tested negative for BRCA in ,       Had gallbladder US on 3/2022- 3 mm polyp seen.     Pap:2024 NILM, negative HPV  2021 NILM, neg HPV     No history of abnormal paps    Contraception:s/p supracervical hysterectomy  Mammo:  2023 normal  Colonoscopy: 2020 one polyp, repeat in 7 years dr. Dickinson  EGD in 2024  Dexa: n/a     OBSTETRICS HISTORY:  OB History    Para Term  AB Living   3 3 3 0 0 3   SAB IAB Ectopic Multiple Live Births   0 0 0 0 3       GYNE HISTORY:  Menarche: 12 (2024 10:39 AM)  Use of Birth Control (if yes, specify type): Hysterectomy (supracervical hysterectomy) (2024 10:39 AM)  Date When Birth Control Last Used: Supracervical hysterectomy (2024 10:39 AM)  Hx Prior Abnormal Pap: No (2024 10:39 AM)  Pap Date: 24 (2024 10:39 AM)  Pap Result Notes: Pap neg (2024 10:39 AM)      History   Sexual Activity    Sexual activity: Yes    Birth control/ protection: Hysterectomy     Comment: supracervical  hysterectomy       MEDICAL HISTORY:  Past Medical History:    Allergy    seasonal allergies    BRCA negative    done at [facility]    Calculus of kidney    Colon polyp    Diverticular disease    Can’t remember… Found out 3 years ago    Esophageal reflux    Fibroid, uterine    fibroids [women's health].   total abdominal hysterectomy    Gastritis    High blood pressure    High cholesterol    Hyperlipidemia    IBS (irritable bowel syndrome)    Can’t remember    Left leg injury    severe lt leg injury/laceration.  surgical repair    Migraine headache    migraine headaches    Migraines    PONV (postoperative nausea and vomiting)    Right nephrolithiasis    \"right right kidney stones\"  2013    Visual impairment    Reading glasses       SOCIAL HISTORY:  Social History     Socioeconomic History    Marital status:      Spouse name: Not on file    Number of children: 3    Years of education: Not on file    Highest education level: Not on file   Occupational History    Occupation: Collections   Tobacco Use    Smoking status: Never    Smokeless tobacco: Never    Tobacco comments:     Don't Smoke   Vaping Use    Vaping status: Never Used   Substance and Sexual Activity     Alcohol use: No    Drug use: No    Sexual activity: Yes     Birth control/protection: Hysterectomy     Comment: supracervical  hysterectomy   Other Topics Concern     Service Not Asked    Blood Transfusions Not Asked    Caffeine Concern No    Occupational Exposure Not Asked    Hobby Hazards Not Asked    Sleep Concern Not Asked    Stress Concern Not Asked    Weight Concern Not Asked    Special Diet Not Asked    Back Care Not Asked    Exercise Not Asked    Bike Helmet Not Asked    Seat Belt Not Asked    Self-Exams Not Asked   Social History Narrative    Not on file     Social Drivers of Health     Financial Resource Strain: Not on file   Food Insecurity: Not on file   Transportation Needs: Not on file   Physical Activity: Not on file   Stress: Not on file   Social Connections: Unknown (3/13/2021)    [facility]    Social Connections     Conversations with friends/family/neighbors per week: Not on file   Housing Stability: Not on file       MEDICATIONS:    Current Outpatient Medications:     naproxen 250 MG Oral Tab, Take 1 tablet (250 mg total) by mouth 2 (two) times daily with meals., Disp: 30 tablet, Rfl: 0    triamcinolone 0.1 % External Cream, Apply topically 2 (two) times daily. - Topical, Disp: 80 g, Rfl: 3    Zolpidem Tartrate ER 12.5 MG Oral Tab CR, Take 1 tablet (12.5 mg total) by mouth nightly as needed for Sleep., Disp: 30 tablet, Rfl: 0    lansoprazole 30 MG Oral Capsule Delayed Release, Take 1 capsule (30 mg total) by mouth before breakfast., Disp: 90 capsule, Rfl: 0    amLODIPine 10 MG Oral Tab, Take 1 tablet (10 mg total) by mouth daily., Disp: 90 tablet, Rfl: 0    hydroCHLOROthiazide 12.5 MG Oral Tab, Take 1 tablet (12.5 mg total) by mouth daily., Disp: 90 tablet, Rfl: 3    atorvastatin 40 MG Oral Tab, Take 1 tablet (40 mg total) by mouth nightly., Disp: 90 tablet, Rfl: 1    cetirizine 10 MG Oral Tab, Take 1 tablet (10 mg  total) by mouth daily., Disp: 90 tablet, Rfl: 3    dicyclomine 10 MG Oral Cap, Take 1 capsule (10 mg total) by mouth 3 (three) times daily as needed., Disp: 30 capsule, Rfl: 1    Multiple Vitamins-Minerals (CENTRUM) Oral Liquid, Take  by mouth., Disp: , Rfl:     aspirin 81 MG Oral Chew Tab, Chew 1 tablet (81 mg total) by mouth daily., Disp: , Rfl:     traMADol 50 MG Oral Tab, Take 1 tablet (50 mg total) by mouth every 6 (six) hours as needed for Pain. (Patient not taking: Reported on 12/7/2024), Disp: 30 tablet, Rfl: 0    HYDROcodone-acetaminophen 5-325 MG Oral Tab, Take 1 tablet by mouth every 6 (six) hours as needed for Pain. (Patient not taking: Reported on 12/7/2024), Disp: 30 tablet, Rfl: 0    methylPREDNISolone (MEDROL) 4 MG Oral Tablet Therapy Pack, Dosepack: take as directed (Patient not taking: Reported on 12/7/2024), Disp: 1 each, Rfl: 0    ALLERGIES:  Allergies[1]      Review of Systems:  Constitutional:  Denies fatigue, night sweats, hot flashes  Cardiovascular:  denies chest pain or palpitations  Respiratory:  denies shortness of breath  Gastrointestinal:  denies heartburn, abdominal pain, diarrhea or constipation  Genitourinary:  denies dysuria, incontinence, abnormal vaginal discharge, vaginal itching see HPI  Musculoskeletal:  denies back pain.  Skin/Breast:  Denies any breast pain, lumps, or discharge.   Neurological:  denies headaches, extremity weakness or numbness.  Psychiatric: denies depression or anxiety.  Endocrine:   denies excessive thirst or urination.  Heme/Lymph:  denies history of anemia, easy bruising or bleeding.      PHYSICAL EXAM:     Vitals:    12/07/24 1042   BP: 136/85   Pulse: 67   Weight: 206 lb (93.4 kg)     Body mass index is 34.28 kg/m².     Patient offered chaperone, patient declined    Constitutional: well developed, well nourished  Head/Face: normocephalic  Lymphatic:no abnormal supraclavicular or axillary adenopathy is noted  Breast: left breast with tenderness to LUOQ  and LLOQ, no masses palpated; right breast normal without palpable masses, tenderness, asymmetry, nipple discharge, nipple retraction or skin changes  Abdomen:  tenderness to epigastric area; bilateral tenderness to low abdomen; no guarding or rebound; soft, nondistended, no masses  Skin/Hair: no unusual rashes or bruises  Extremities: no edema, no cyanosis  Psychiatric:  Oriented to time, place, person and situation. Appropriate mood and affect    Pelvic Exam:  External Genitalia: normal appearance, hair distribution, and no lesions  Urethral Meatus:  normal in size, location, without lesions and prolapse  Bladder:  No fullness, masses or tenderness  Vagina:  Normal appearance without lesions, no abnormal discharge  Cervix:  Normal without tenderness on motion  Uterus: absent  Adnexa: difficult to palpate due to body habitus, tenderness bilaterally  Perineum: normal  Anus: no hemorroids   Lymph node: no inguinal lymph nodes    Assessment & Plan:  Angi was seen today for gyn problem and refill request.    Diagnoses and all orders for this visit:    Pelvic pain  -     Vaginitis Vaginosis PCR Panel; Future  -     CT ABDOMEN+PELVIS(ALL W+WO)(CPT=74178); Future  -     Vaginitis Vaginosis PCR Panel    Generalized abdominal pain  -     CT ABDOMEN+PELVIS(ALL W+WO)(CPT=74178); Future    History of abdominal supracervical subtotal hysterectomy  -     CT ABDOMEN+PELVIS(ALL W+WO)(CPT=74178); Future    Screening examination for STI  -     Chlamydia/Gc Amplification; Future  -     HCV Antibody; Future  -     Hepatitis B Surface Antigen; Future  -     HIV Ag/Ab Combo; Future  -     T Pallidum Screening Cascade; Future  -     Chlamydia/Gc Amplification    Breast pain, left  -     CHRISTIAN YASH 2D+3D DIAGNOSTIC CHRISTIAN  BILAT (CPT=77066/77759); Future    Other orders  -     naproxen 250 MG Oral Tab; Take 1 tablet (250 mg total) by mouth 2 (two) times daily with meals.    Pelvic ultrasound in march normal when had similar symptoms  Will  send for CT of abdomen and pelvis - follow up with CT if normal ovaries  Hx of multiple abdominal surgeries and diverticulosis  No bowel changes  Desires all sti testing  Mammogram bilatral diagnostic ordered due to pain  Naproxen ordered and tylenol prn - reviewed if pain severe needs ER evaluation      Ashlie Sanderson, APRN    This note was prepared using Dragon Medical voice recognition dictation software. As a result errors may occur. When identified these errors have been corrected. While every attempt is made to correct errors during dictation discrepancies may still exist.         [1]   Allergies  Allergen Reactions    Morphine HIVES    Toradol [Ketorolac Tromethamine] OTHER (SEE COMMENTS)     Upset stomach per pt

## 2024-12-09 LAB
C TRACH DNA SPEC QL NAA+PROBE: NEGATIVE
N GONORRHOEA DNA SPEC QL NAA+PROBE: NEGATIVE

## 2024-12-15 DIAGNOSIS — G43.001 MIGRAINE WITHOUT AURA AND WITH STATUS MIGRAINOSUS, NOT INTRACTABLE: ICD-10-CM

## 2024-12-19 RX ORDER — TRAMADOL HYDROCHLORIDE 50 MG/1
50 TABLET ORAL EVERY 6 HOURS PRN
Qty: 30 TABLET | Refills: 0 | Status: SHIPPED | OUTPATIENT
Start: 2024-12-19

## 2024-12-19 NOTE — TELEPHONE ENCOUNTER
Please review. Protocol Failed; No Protocol      Recent fills: 8/9/2024, 10/23/2024  Last Rx written: 10/23/2024  Last office visit: 5/9/2024          Requested Prescriptions   Pending Prescriptions Disp Refills    traMADol 50 MG Oral Tab 30 tablet 0     Sig: Take 1 tablet (50 mg total) by mouth every 6 (six) hours as needed for Pain.       Controlled Substance Medication Failed - 12/19/2024  2:33 PM        Failed - This medication is a controlled substance - forward to provider to refill               Future Appointments         Provider Department Appt Notes    In 2 days [facility] CT MAIN 1 [facility] CT QA CM COB/INS    In 1 week [facility] CHRISTIAN 3 [facility] Mammography QA CO INS COB    In 2 months [facility] - last px 5/13/2022          Recent Outpatient Visits              1 week ago Pelvic pain    [facility] - OB/GYN Ashlie Sanderson, APRN    Office Visit    7 months ago Melena    [facility] Gera Dey, DO    Office Visit    9 months ago Encounter for annual routine gynecological examination    [facility] Weiler, Colleen M, DO    Office Visit    11 months ago Gluteal pain    [facility] Weiler, Colleen M, DO    Office Visit    1 year ago Postoperative examination    [facility] Nikhil Mancera MD    Office Visit

## 2024-12-21 ENCOUNTER — HOSPITAL ENCOUNTER (OUTPATIENT)
Dept: CT IMAGING | Facility: HOSPITAL | Age: 59
Discharge: HOME | End: 2024-12-21
Attending: NURSE PRACTITIONER
Payer: COMMERCIAL

## 2024-12-21 ENCOUNTER — TELEPHONE (OUTPATIENT)
Dept: OBGYN CLINIC | Facility: CLINIC | Age: 59
End: 2024-12-21

## 2024-12-21 DIAGNOSIS — R10.84 GENERALIZED ABDOMINAL PAIN: ICD-10-CM

## 2024-12-21 DIAGNOSIS — R10.2 PELVIC PAIN: ICD-10-CM

## 2024-12-21 DIAGNOSIS — Z90.711 HISTORY OF ABDOMINAL SUPRACERVICAL SUBTOTAL HYSTERECTOMY: ICD-10-CM

## 2024-12-21 PROCEDURE — 74177 CT ABD & PELVIS W/CONTRAST: CPT | Performed by: NURSE PRACTITIONER

## 2024-12-21 NOTE — TELEPHONE ENCOUNTER
Patient at CT with contrast already.  Received call from Shikha at [facility], per Shikha they normally only do CT with Contrast to limit patients exposure to radiation. Per Shikha they will only do the CT with contrast at this time.    Information to Ashlie Sanderson

## 2024-12-24 ENCOUNTER — TELEPHONE (OUTPATIENT)
Facility: CLINIC | Age: 59
End: 2024-12-24

## 2024-12-24 ENCOUNTER — TELEPHONE (OUTPATIENT)
Dept: OBGYN CLINIC | Facility: CLINIC | Age: 59
End: 2024-12-24

## 2024-12-24 DIAGNOSIS — Z86.0100 HISTORY OF COLON POLYPS: ICD-10-CM

## 2024-12-24 DIAGNOSIS — R93.5 ABNORMAL COMPUTED TOMOGRAPHY ANGIOGRAPHY (CTA) OF ABDOMEN AND PELVIS: ICD-10-CM

## 2024-12-24 DIAGNOSIS — Z12.11 COLON CANCER SCREENING: Primary | ICD-10-CM

## 2024-12-24 NOTE — TELEPHONE ENCOUNTER
Patient states that she had CT done per Ashlie Sanderson and was told to call this office to request that Dr. Dickinson review results.  Please call.

## 2024-12-24 NOTE — TELEPHONE ENCOUNTER
Spoke to patient, she sees Dr Dickinson for GI and will make an appointment. She will also call Dr Dey her PCP.

## 2024-12-24 NOTE — TELEPHONE ENCOUNTER
Please call patient. Reviewed CT due to pain. Needs to see GI per CT results (below). Also have her see PCP in mean time due to pain.       Impression  CONCLUSION:    1. Mild possible colonic wall thickening along the transverse colon.  This is poorly assessed on CT secondary to partially collapsed collapsed state.  Recommend evaluation with colonoscopy to exclude underlying lesion.  2. Otherwise no acute abnormality in the abdomen or pelvis.        LOCATION:  [facility]

## 2024-12-24 NOTE — TELEPHONE ENCOUNTER
Patient contacted and results of her CT scan discussed.  There are possible mild colonic wall thickening in the transverse colon.  The radiologist said that this area is collapsed and could not evaluate this properly with CT.  Possibility of peristalsis is also a consideration.    Discussed the above with the patient, she is out 4 years from her last colonoscopy, for completeness sake I would advise that we schedule colonoscopy sooner rather than later.    Nursing staff and schedulers set up a colonoscopy in January, okay to add on to my schedule.  Diagnosis history of colon polyps, colorectal cancer screening, abnormal CT scan of the abdomen pelvis  GoLytely bowel preparation  MAC sedation

## 2024-12-24 NOTE — TELEPHONE ENCOUNTER
Dr Dickinson    The patient is asking to please review CT abdomen/pelvis results completed 12/21/2024.    Last seen in GI: 6/6/2024 for EGD, 8/28/2020 for CLN, next CLN in 2027.    Thank you

## 2024-12-26 NOTE — TELEPHONE ENCOUNTER
Scheduled for:  Colonoscopy 52793  Provider Name:  Dr. Dickinson  Date:  1/7/2025  Location:   [facility]  Sedation:  MAC  Time:  7:30 AM - Patient is aware NE will call the day before with arrival time.  Prep:  Golytely  Meds/Allergies Reconciled?:  Physician reviewed   Diagnosis with codes: Colon cancer screening Z12.11; Hx: Colon polyps Z86.010; Abnormal CT scan of abdomen pelvis R93.5  Was patient informed to call insurance with codes (Y/N):  Yes, I confirmed [employer] PPO insurance with the patient.   Referral sent?:  Referral was sent at the time of electronic surgical scheduling.   [facility] or [facility] notified?:  I sent an electronic request to Endo Scheduling and received a confirmation today.   Medication Orders:  Hold multivitamins/supplements one week prior to procedure.  Misc Orders:  N/A     Further instructions given by staff:   I discussed the prep instructions with the patient which she verbally understood and is aware that I will send the instructions today.

## 2024-12-26 NOTE — TELEPHONE ENCOUNTER
Dr. Dickinson -    Can you send a new bowel prep order to the [facility] in [place] on file?    Thank you!

## 2024-12-27 ENCOUNTER — HOSPITAL ENCOUNTER (OUTPATIENT)
Dept: MAMMOGRAPHY | Facility: HOSPITAL | Age: 59
Discharge: HOME | End: 2024-12-27
Attending: NURSE PRACTITIONER
Payer: COMMERCIAL

## 2024-12-27 DIAGNOSIS — N64.4 BREAST PAIN, LEFT: ICD-10-CM

## 2024-12-27 PROCEDURE — 76642 ULTRASOUND BREAST LIMITED: CPT | Performed by: NURSE PRACTITIONER

## 2024-12-27 PROCEDURE — 77066 DX MAMMO INCL CAD BI: CPT | Performed by: NURSE PRACTITIONER

## 2024-12-27 PROCEDURE — 77062 BREAST TOMOSYNTHESIS BI: CPT | Performed by: NURSE PRACTITIONER

## 2025-01-01 ENCOUNTER — TELEPHONE (OUTPATIENT)
Dept: FAMILY MEDICINE CLINIC | Facility: CLINIC | Age: 60
End: 2025-01-01

## 2025-01-01 RX ORDER — METHYLPREDNISOLONE 4 MG/1
TABLET ORAL
Qty: 1 EACH | Refills: 0 | Status: SHIPPED | OUTPATIENT
Start: 2025-01-01

## 2025-01-01 NOTE — TELEPHONE ENCOUNTER
Pt has right sided sciatica.  Last flare up was last year.  Pain has started again.  Medrol dose pack typically helps.  Sent to [facility]

## 2025-01-06 ENCOUNTER — TELEPHONE (OUTPATIENT)
Facility: CLINIC | Age: 60
End: 2025-01-06

## 2025-01-06 NOTE — TELEPHONE ENCOUNTER
Called patient - went over prep instructions and what time to drink the bowel prep.    No further questions at this time.

## 2025-01-07 ENCOUNTER — ANESTHESIA EVENT (OUTPATIENT)
Dept: ENDOSCOPY | Age: 60
End: 2025-01-07
Payer: COMMERCIAL

## 2025-01-07 ENCOUNTER — HOSPITAL ENCOUNTER (OUTPATIENT)
Age: 60
Setting detail: HOSPITAL OUTPATIENT SURGERY
Discharge: HOME | End: 2025-01-07
Attending: INTERNAL MEDICINE | Admitting: INTERNAL MEDICINE
Payer: COMMERCIAL

## 2025-01-07 ENCOUNTER — ANESTHESIA (OUTPATIENT)
Dept: ENDOSCOPY | Age: 60
End: 2025-01-07
Payer: COMMERCIAL

## 2025-01-07 VITALS
RESPIRATION RATE: 21 BRPM | WEIGHT: 206 LBS | SYSTOLIC BLOOD PRESSURE: 122 MMHG | OXYGEN SATURATION: 98 % | HEIGHT: 65 IN | DIASTOLIC BLOOD PRESSURE: 87 MMHG | BODY MASS INDEX: 34.32 KG/M2 | HEART RATE: 64 BPM

## 2025-01-07 DIAGNOSIS — Z86.0100 HISTORY OF COLON POLYPS: ICD-10-CM

## 2025-01-07 DIAGNOSIS — Z12.11 COLON CANCER SCREENING: ICD-10-CM

## 2025-01-07 DIAGNOSIS — R93.5 ABNORMAL COMPUTED TOMOGRAPHY ANGIOGRAPHY (CTA) OF ABDOMEN AND PELVIS: ICD-10-CM

## 2025-01-07 PROCEDURE — 45385 COLONOSCOPY W/LESION REMOVAL: CPT | Performed by: INTERNAL MEDICINE

## 2025-01-07 PROCEDURE — 99070 SPECIAL SUPPLIES PHYS/QHP: CPT | Performed by: INTERNAL MEDICINE

## 2025-01-07 PROCEDURE — 88305 TISSUE EXAM BY PATHOLOGIST: CPT | Performed by: INTERNAL MEDICINE

## 2025-01-07 DEVICE — REPLAY HEMOSTASIS CLIP, 11MM SPAN
Type: IMPLANTABLE DEVICE | Site: COLON | Status: FUNCTIONAL
Brand: REPLAY

## 2025-01-07 RX ORDER — LANSOPRAZOLE 30 MG/1
30 CAPSULE, DELAYED RELEASE ORAL
Qty: 90 CAPSULE | Refills: 0 | Status: SHIPPED | OUTPATIENT
Start: 2025-01-07

## 2025-01-07 RX ORDER — NALOXONE HYDROCHLORIDE 0.4 MG/ML
0.08 INJECTION, SOLUTION INTRAMUSCULAR; INTRAVENOUS; SUBCUTANEOUS ONCE AS NEEDED
OUTPATIENT
Start: 2025-01-07 | End: 2025-01-07

## 2025-01-07 RX ORDER — SODIUM CHLORIDE, SODIUM LACTATE, POTASSIUM CHLORIDE, CALCIUM CHLORIDE 600; 310; 30; 20 MG/100ML; MG/100ML; MG/100ML; MG/100ML
INJECTION, SOLUTION INTRAVENOUS CONTINUOUS
Status: DISCONTINUED | OUTPATIENT
Start: 2025-01-07 | End: 2025-01-07

## 2025-01-07 RX ORDER — SODIUM CHLORIDE, SODIUM LACTATE, POTASSIUM CHLORIDE, CALCIUM CHLORIDE 600; 310; 30; 20 MG/100ML; MG/100ML; MG/100ML; MG/100ML
INJECTION, SOLUTION INTRAVENOUS CONTINUOUS PRN
Status: DISCONTINUED | OUTPATIENT
Start: 2025-01-07 | End: 2025-01-07 | Stop reason: SURG

## 2025-01-07 RX ORDER — SODIUM CHLORIDE, SODIUM LACTATE, POTASSIUM CHLORIDE, CALCIUM CHLORIDE 600; 310; 30; 20 MG/100ML; MG/100ML; MG/100ML; MG/100ML
INJECTION, SOLUTION INTRAVENOUS CONTINUOUS
OUTPATIENT
Start: 2025-01-07

## 2025-01-07 RX ORDER — LIDOCAINE HYDROCHLORIDE 10 MG/ML
INJECTION, SOLUTION EPIDURAL; INFILTRATION; INTRACAUDAL; PERINEURAL AS NEEDED
Status: DISCONTINUED | OUTPATIENT
Start: 2025-01-07 | End: 2025-01-07 | Stop reason: SURG

## 2025-01-07 RX ADMIN — LIDOCAINE HYDROCHLORIDE 50 MG: 10 INJECTION, SOLUTION EPIDURAL; INFILTRATION; INTRACAUDAL; PERINEURAL at 07:36:00

## 2025-01-07 RX ADMIN — SODIUM CHLORIDE, SODIUM LACTATE, POTASSIUM CHLORIDE, CALCIUM CHLORIDE: 600; 310; 30; 20 INJECTION, SOLUTION INTRAVENOUS at 07:34:00

## 2025-01-07 NOTE — ANESTHESIA PREPROCEDURE EVALUATION
Anesthesia PreOp Note    HPI:     Angi Sarkar is a 59 year old female who presents for preoperative consultation requested by: Eren Dickinson MD    Date of Surgery: 1/7/2025    Procedure(s):  COLONOSCOPY  Indication: Colon cancer screening, History of colon polyps/ Abnormal computed tomography angiography (CTA) of abdomen and pelvis    Relevant Problems   No relevant active problems       NPO:  Last Liquid Consumption Date: 01/07/25  Last Liquid Consumption Time: 0300  Last Solid Consumption Date: 01/06/25  Last Solid Consumption Time: 0800  Last Liquid Consumption Date: 01/07/25          History Review:  Patient Active Problem List    Diagnosis Date Noted    Postoperative examination 06/27/2023    Adhesion of abdominal wall     Biliary colic 06/13/2023    BRCA negative 04/05/2021    Medication side effect, initial encounter 09/19/2020    Edema of both lower legs 06/16/2020    History of colon polyps 06/16/2020    Bacterial vaginosis 06/16/2020    Vasomotor rhinitis 12/22/2019    Essential hypertension 12/05/2019    Migraine without aura 06/22/2019    Hyperlipidemia 09/29/2014       Past Medical History:    Allergy    seasonal allergies    BRCA negative    done at [facility]    Calculus of kidney    Colon polyp    Diverticular disease    Can’t remember… Found out 3 years ago    Esophageal reflux    Fibroid, uterine    fibroids [women's health].  2001 total abdominal hysterectomy    Gastritis    High blood pressure    High cholesterol    History of stomach ulcers    Hx of motion sickness    Hyperlipidemia    IBS (irritable bowel syndrome)    Can’t remember    Left leg injury    severe lt leg injury/laceration.  surgical repair    Migraine headache    migraine headaches    Migraines    PONV (postoperative nausea and vomiting)    Right nephrolithiasis    \"right right kidney stones\"  June 2013    Visual impairment    Reading glasses       Past Surgical History:   Procedure Laterality Date     Cholecystectomy      Colonoscopy N/A 08/28/2020    Procedure: COLONOSCOPY;  Surgeon: Eren Dickinson MD;  Location: [facility] ENDOSCOPY    Foot surgery Right 2014    [facility] supracervical abdominal hysterectomy  2001    fibroids, still has cervix and ovaries    Hemorrhoidectomy  06/16/2015    PPH hemorrhoidectomy with rectal mucosal proctopexy    Hemorrhoidectomy,int/ext,simple      Can’t remember    Hysterectomy  6/2001    Partial, still have Cervix and Ovaries    Leg/ankle surgery proc unlisted Left 1970    surgical repair, severe lt leg injury/laceration    Myomectomy 5/> intramural myomas &/or total wt >250 gms, abdominal approach  1997    Removal gallbladder      Removal of ovarian cyst(s)      Total abdom hysterectomy      Partial in 2021    Upper gi endoscopy,biopsy  08/28/2020       Prescriptions Prior to Admission[1]  Current Medications and Prescriptions Ordered in Epic[2]    Allergies[3]    Family History   Problem Relation Age of Onset    Hypertension Father     Hypertension Mother     Breast Cancer Mother 52    Prostate Cancer Maternal Grandfather 60    Colon Cancer Maternal Grandfather     Cancer Maternal Grandfather     Colon Polyps Maternal Grandfather     Breast Cancer Paternal Grandmother         70's    Cancer Paternal Grandmother     Prostate Cancer Paternal Grandfather     Colon Cancer Paternal Grandfather     Breast Cancer Sister 45    Ovarian Cancer Neg      Social History     Socioeconomic History    Marital status:     Number of children: 3   Occupational History    Occupation: Collections   Tobacco Use    Smoking status: Never    Smokeless tobacco: Never    Tobacco comments:     Don't Smoke   Vaping Use    Vaping status: Never Used   Substance and Sexual Activity    Alcohol use: No    Drug use: No    Sexual activity: Yes     Birth control/protection: Hysterectomy     Comment: supracervical  hysterectomy   Other Topics Concern    Caffeine Concern No       Available pre-op labs  reviewed.  Lab Results   Component Value Date    WBC 7.1 12/07/2024    RBC 4.92 12/07/2024    HGB 13.1 12/07/2024    HCT 41.7 12/07/2024    MCV 84.8 12/07/2024    MCH 26.6 12/07/2024    MCHC 31.4 12/07/2024    RDW 13.4 12/07/2024    .0 (L) 12/07/2024     Lab Results   Component Value Date     12/07/2024    K 4.1 12/07/2024     (H) 12/07/2024    CO2 24.0 12/07/2024    BUN 12 12/07/2024    CREATSERUM 1.00 12/07/2024    GLU 82 12/07/2024    CA 9.5 12/07/2024          Vital Signs:  Body mass index is 34.28 kg/m².   height is 1.651 m (5' 5\") and weight is 93.4 kg (206 lb). Her blood pressure is 125/80 and her pulse is 68. Her respiration is 13 and oxygen saturation is 98%.   Vitals:    12/30/24 1611 01/07/25 0646 01/07/25 0655 01/07/25 0700   BP:  125/80  125/80   Pulse:  68 75 68   Resp:  13 13    SpO2:  98% 98% 98%   Weight: 93.4 kg (206 lb)      Height: 1.651 m (5' 5\")           Anesthesia Evaluation     Patient summary reviewed and Nursing notes reviewed    Airway   Mallampati: III  TM distance: <3 FB  Neck ROM: full  Dental - Dentition appears grossly intact     Pulmonary - normal exam   Cardiovascular - normal exam  (+) hypertension    Neuro/Psych    (+)  headaches,        GI/Hepatic/Renal    (+) GERD    Endo/Other    Abdominal                  Anesthesia Plan:   ASA:  2  Plan:   MAC  Informed Consent Plan and Risks Discussed With:  Patient      I have informed Angi Sarkar and/or legal guardian or family member of the nature of the anesthetic plan, benefits, risks including possible dental damage if relevant, major complications, and any alternative forms of anesthetic management.   All of the patient's questions were answered to the best of my ability. The patient desires the anesthetic management as planned.  Jose Lizarraga MD  1/7/2025 7:26 AM  Present on Admission:  **None**           [1]   Medications Prior to Admission   Medication Sig Dispense Refill Last Dose/Taking     methylPREDNISolone (MEDROL) 4 MG Oral Tablet Therapy Pack As directed. 1 each 0 2025    traMADol 50 MG Oral Tab Take 1 tablet (50 mg total) by mouth every 6 (six) hours as needed for Pain. 30 tablet 0 2025    naproxen 250 MG Oral Tab Take 1 tablet (250 mg total) by mouth 2 (two) times daily with meals. 30 tablet 0 2025    triamcinolone 0.1 % External Cream Apply topically 2 (two) times daily. - Topical 80 g 3 2025    Zolpidem Tartrate ER 12.5 MG Oral Tab CR Take 1 tablet (12.5 mg total) by mouth nightly as needed for Sleep. 30 tablet 0 2025    lansoprazole 30 MG Oral Capsule Delayed Release Take 1 capsule (30 mg total) by mouth before breakfast. 90 capsule 0 2025    amLODIPine 10 MG Oral Tab Take 1 tablet (10 mg total) by mouth daily. 90 tablet 0 2025    hydroCHLOROthiazide 12.5 MG Oral Tab Take 1 tablet (12.5 mg total) by mouth daily. 90 tablet 3 2025    atorvastatin 40 MG Oral Tab Take 1 tablet (40 mg total) by mouth nightly. 90 tablet 1 2025    Multiple Vitamins-Minerals (CENTRUM) Oral Liquid Take  by mouth.   2025    aspirin 81 MG Oral Chew Tab Chew 1 tablet (81 mg total) by mouth daily.   2025    [] polyethylene glycol, PEG 3350-KCl-NaBcb-NaCl-NaSulf, 236 g Oral Recon Soln Take 4,000 mL by mouth once for 1 dose. As directed by GI clinic instructions. 4000 mL 0     [] polyethylene glycol, PEG 3350-KCl-NaBcb-NaCl-NaSulf, 236 g Oral Recon Soln Take 4,000 mL by mouth once for 1 dose. As directed by GI clinic instructions. 4000 mL 0     cetirizine 10 MG Oral Tab Take 1 tablet (10 mg total) by mouth daily. (Patient not taking: Reported on 2024) 90 tablet 3 Not Taking    dicyclomine 10 MG Oral Cap Take 1 capsule (10 mg total) by mouth 3 (three) times daily as needed. (Patient not taking: Reported on 2024) 30 capsule 1 Not Taking   [2]   No current Epic-ordered facility-administered medications on file.     No current Epic-ordered outpatient  medications on file.   [3]   Allergies  Allergen Reactions    Morphine HIVES    Toradol [Ketorolac Tromethamine] OTHER (SEE COMMENTS)     Upset stomach per pt

## 2025-01-07 NOTE — H&P
History & Physical Examination    Patient Name: Angi Sarkar  MRN: [id number]  CSN: [id number]  YOB: 1965    Diagnosis: CRC screening  Abnormal CT scan ( thickening transverse colon )       Prescriptions Prior to Admission[1]  Current Facility-Administered Medications   Medication Dose Route Frequency    lactated ringers infusion   Intravenous Continuous       Allergies: Allergies[2]    Past Medical History:    Allergy    seasonal allergies    BRCA negative    done at [facility]    Calculus of kidney    Colon polyp    Diverticular disease    Can’t remember… Found out 3 years ago    Esophageal reflux    Fibroid, uterine    fibroids [women's health].  2001 total abdominal hysterectomy    Gastritis    High blood pressure    High cholesterol    History of stomach ulcers    Hx of motion sickness    Hyperlipidemia    IBS (irritable bowel syndrome)    Can’t remember    Left leg injury    severe lt leg injury/laceration.  surgical repair    Migraine headache    migraine headaches    Migraines    PONV (postoperative nausea and vomiting)    Right nephrolithiasis    \"right right kidney stones\"  June 2013    Visual impairment    Reading glasses     Past Surgical History:   Procedure Laterality Date    Cholecystectomy      Colonoscopy N/A 08/28/2020    Procedure: COLONOSCOPY;  Surgeon: Eren Dickinson MD;  Location: [facility] ENDOSCOPY    Colonoscopy  01/07/2025    polyp, diverticulosis, hemorrhoids    Foot surgery Right 2014    [facility] supracervical abdominal hysterectomy  2001    fibroids, still has cervix and ovaries    Hemorrhoidectomy  06/16/2015    PPH hemorrhoidectomy with rectal mucosal proctopexy    Hemorrhoidectomy,int/ext,simple      Can’t remember    Hysterectomy  6/2001    Partial, still have Cervix and Ovaries    Leg/ankle surgery proc unlisted Left 1970    surgical repair, severe lt leg injury/laceration    Myomectomy 5/> intramural myomas &/or total wt >250 gms, abdominal approach   1997    Removal gallbladder      Removal of ovarian cyst(s)      Total abdom hysterectomy      Partial in 2021    Upper gi endoscopy,biopsy  08/28/2020     Family History   Problem Relation Age of Onset    Hypertension Father     Hypertension Mother     Breast Cancer Mother 52    Prostate Cancer Maternal Grandfather 60    Colon Cancer Maternal Grandfather     Cancer Maternal Grandfather     Colon Polyps Maternal Grandfather     Breast Cancer Paternal Grandmother         70's    Cancer Paternal Grandmother     Prostate Cancer Paternal Grandfather     Colon Cancer Paternal Grandfather     Breast Cancer Sister 45    Ovarian Cancer Neg      Social History     Tobacco Use    Smoking status: Never    Smokeless tobacco: Never    Tobacco comments:     Don't Smoke   Substance Use Topics    Alcohol use: No       SYSTEM Check if Review is Normal Check if Physical Exam is Normal If not normal, please explain:   HEENT [x ] [ x]    NECK & BACK [x ] [x ]    HEART [x ] [ x]    LUNGS [x ] [ x]    ABDOMEN [x ] [x ]    UROGENITAL [ ] [ ]    EXTREMITIES [x ] [x ]    OTHER        [ x ] I have discussed the risks and benefits and alternatives with the patient/family.  They understand and agree to proceed with plan of care.  [ x ] I have reviewed the History and Physical done within the last 30 days.  Any changes noted above.    Eren Dickinson MD  1/7/2025  8:06 AM         [1]   Medications Prior to Admission   Medication Sig Dispense Refill Last Dose/Taking    methylPREDNISolone (MEDROL) 4 MG Oral Tablet Therapy Pack As directed. 1 each 0 1/4/2025    traMADol 50 MG Oral Tab Take 1 tablet (50 mg total) by mouth every 6 (six) hours as needed for Pain. 30 tablet 0 1/6/2025    naproxen 250 MG Oral Tab Take 1 tablet (250 mg total) by mouth 2 (two) times daily with meals. 30 tablet 0 1/4/2025    triamcinolone 0.1 % External Cream Apply topically 2 (two) times daily. - Topical 80 g 3 1/6/2025    Zolpidem Tartrate ER 12.5 MG Oral Tab CR Take  1 tablet (12.5 mg total) by mouth nightly as needed for Sleep. 30 tablet 0 2025    lansoprazole 30 MG Oral Capsule Delayed Release Take 1 capsule (30 mg total) by mouth before breakfast. 90 capsule 0 2025    amLODIPine 10 MG Oral Tab Take 1 tablet (10 mg total) by mouth daily. 90 tablet 0 2025    hydroCHLOROthiazide 12.5 MG Oral Tab Take 1 tablet (12.5 mg total) by mouth daily. 90 tablet 3 2025    atorvastatin 40 MG Oral Tab Take 1 tablet (40 mg total) by mouth nightly. 90 tablet 1 2025    Multiple Vitamins-Minerals (CENTRUM) Oral Liquid Take  by mouth.   2025    aspirin 81 MG Oral Chew Tab Chew 1 tablet (81 mg total) by mouth daily.   2025    [] polyethylene glycol, PEG 3350-KCl-NaBcb-NaCl-NaSulf, 236 g Oral Recon Soln Take 4,000 mL by mouth once for 1 dose. As directed by GI clinic instructions. 4000 mL 0     [] polyethylene glycol, PEG 3350-KCl-NaBcb-NaCl-NaSulf, 236 g Oral Recon Soln Take 4,000 mL by mouth once for 1 dose. As directed by GI clinic instructions. 4000 mL 0     cetirizine 10 MG Oral Tab Take 1 tablet (10 mg total) by mouth daily. (Patient not taking: Reported on 2024) 90 tablet 3 Not Taking    dicyclomine 10 MG Oral Cap Take 1 capsule (10 mg total) by mouth 3 (three) times daily as needed. (Patient not taking: Reported on 2024) 30 capsule 1 Not Taking   [2]   Allergies  Allergen Reactions    Morphine HIVES    Toradol [Ketorolac Tromethamine] OTHER (SEE COMMENTS)     Upset stomach per pt

## 2025-01-07 NOTE — ANESTHESIA POSTPROCEDURE EVALUATION
Patient: Angi Galicia Antonina    Procedure Summary       Date: 01/07/25 Room / Location: [facility] ENDOSCOPY 01 / [facility] ENDO    Anesthesia Start: 0734 Anesthesia Stop: 0800    Procedure: COLONOSCOPY Diagnosis:       Colon cancer screening      History of colon polyps      Abnormal computed tomography angiography (CTA) of abdomen and pelvis      (polyp, diverticulosis, hemorrhoids)    Surgeons: Eren Dickinson MD Anesthesiologist: Jose Lizarraga MD    Anesthesia Type: MAC ASA Status: 2            Anesthesia Type: MAC    Vitals Value Taken Time   /77 01/07/25 0810   Temp  01/07/25 0811   Pulse 80 01/07/25 0810   Resp 17 01/07/25 0810   SpO2 97 % 01/07/25 0810   Vitals shown include unfiled device data.    [facility] AN Post Evaluation:   Patient Evaluated in PACU  Patient Participation: complete - patient participated  Level of Consciousness: sleepy but conscious  Pain Score: 0  Pain Management: adequate  Airway Patency:patent  Yes    Nausea/Vomiting: none  Cardiovascular Status: acceptable  Respiratory Status: acceptable  Postoperative Hydration acceptable      Jose Lizarraga MD  1/7/2025 8:11 AM

## 2025-01-07 NOTE — TELEPHONE ENCOUNTER
PPI (Proton Pump Inhibitors): Nexium (Esomeprazole), Protonix (Pantoprazole), Dexilant (Dexlansoprazole), Prevacid (Lansoprazole), Aciphex (Rabeprazole), Omperazole      Protocol Criteria  Review last office visit note(s), plan of care, for any change     Appointment scheduled within the past 12 months or in the next 3 months or had a procedure (if applicable) and is up to date with their recall     Date of Last Office Visit: 06/08/2023    Date of next scheduled appointment:     Date of last procedure (if applicable):01/07/2025    Date of recall (if applicable):

## 2025-01-07 NOTE — OPERATIVE REPORT
[facility] Endoscopy Report  Date of procedure-January 7, 2025    Preoperative Diagnosis:  -Colorectal cancer screening  -Abnormal CT scan    Postoperative Diagnosis:  -Colon polyp x 1  -Right sided diverticular disease  -Internal hemorrhoids  -No abnormality noted in the transverse colon as questioned on CT scan    Procedure:    Colonoscopy     Surgeon:  Eren Dickinson M.D.    Anesthesia:  MAC     Technique:  After informed consent, the patient was placed in the left lateral recumbent position.  Digital rectal examination revealed no palpable intraluminal abnormalities.  An Olympus variable stiffness 190 series HD colonoscope was inserted into the rectum and advanced under direct vision by following the lumen to the cecum.  The colon was examined upon withdrawal in the left lateral position.  The procedure was well tolerated without immediate complication.    Findings:  The preparation of the colon was good.  The terminal ileum was examined for 4 cm and visually normal.  The ileocecal valve was well preserved. The visualized colonic mucosa from the cecum to the anal verge was normal with an intact vascular pattern.    Descending colon polyp x 1, sessile 6 mm in size and cold snare removed.  A slight amount of oozing was noted from the polypectomy site and a single clip was placed across mucosal defect with excellent hemostasis.  Specimen was retrieved and sent for analysis.    Occasional rare diverticula noted in the right side of the colon, no diverticulitis.  Small internal hemorrhoids noted on retroflexed view.    Meticulous examination of the entire colon was performed, no abnormality noted in the transverse colon as clinically questioned on prior CT scan.    Estimated blood loss-significant  Specimens-see above      Impression:  -Colon polyp x 1  -Right sided diverticular disease  -Internal hemorrhoids  -No abnormality noted in the transverse colon as questioned on CT  scan    Recommendations:  - Post polypectomy instructions given  - Repeat colonoscopy in 5 years  - High fiber diet for diverticular disease  - Symptomatic treatment of hemorrhoids          Eren Dickinson MD  1/7/2025  8:06 AM

## 2025-01-07 NOTE — DISCHARGE INSTRUCTIONS
Home Care Instructions for Colonoscopy with Sedation    Diet:  - Resume your regular diet as tolerated unless otherwise instructed.  - Start with light meals to minimize bloating.  - Do not drink alcohol today.    Medication:  - If you have questions about resuming your normal medications, please contact your Primary Care Physician.    Activities:  - Take it easy today. Do not return to work today.  - Do not drive today.  - Do not operate any machinery today (including kitchen equipment).    Colonoscopy:  - You may notice some rectal \"spotting\" (a little blood on the toilet tissue) for a day or two after the exam. This is normal.  - If you experience any rectal bleeding (not spotting), persistent tenderness or sharp severe abdominal pains, oral temperature over 100 degrees Fahrenheit, light-headedness or dizziness, or any other problems, contact your doctor.        1 clip placed will eventually fall off  **If unable to reach your doctor, please go to the [facility] Emergency Room**    - Your referring physician will receive a full report of your examination.  - If you do not hear from your doctor's office within two weeks of your biopsy, please call them for your results.    You may be able to see your laboratory results in [employer] between 4 and 7 business days.  In some cases, your physician may not have viewed the results before they are released to [employer].  If you have questions regarding your results contact the physician who ordered the test/exam by phone or via [employer] by choosing \"Ask a Medical Question.\"

## 2025-01-08 ENCOUNTER — TELEPHONE (OUTPATIENT)
Facility: CLINIC | Age: 60
End: 2025-01-08

## 2025-01-08 NOTE — TELEPHONE ENCOUNTER
Health Maintenance Updated.    5 year colonoscopy recall entered into patient outreach in [facility].  Next colonoscopy will be due 1/7/2030.    Result letter mailed to patient's home address.

## 2025-01-08 NOTE — TELEPHONE ENCOUNTER
Dr. Dickinson    I spoke to Angi.  Yesterday she was ok, went home and rested as advised.  Pain is over abdomen area can hardly sit up.  Rates the pain an 8/10.  Feels bloated.  I told her to go to the ER as soon as possible.  She has no one to drive her and her daughter is using her car.  I advised if has no one to take her here then she should call an ambulance.  She took NSAID for pain, asking about pain medication.  I advised against taking further NSAIDS until she gets assessed as these can cause more bleeding if something more serious going on.  States she is not bleeding.  I told her that she needs to get assessed and make sure nothing serious like perforation.    I told her I would let you know.    Thank you

## 2025-01-08 NOTE — TELEPHONE ENCOUNTER
Patient called to speak with a nurse in regards to yesterdays procedure. Patient states that she has been experiencing pain and is concerned. Please call.

## 2025-01-08 NOTE — TELEPHONE ENCOUNTER
----- Message from Eren Dickinson sent at 1/8/2025 11:10 AM CST -----  I wanted to get back to you with your colonoscopy results.  You had one colon polyp removed which was benign.  I would advise a repeat colonoscopy in 5 years to make sure no new polyps are forming.      You also have internal hemorrhoids and diverticulosis.  Please stay on a high fiber diet and call with any questions.

## 2025-01-08 NOTE — TELEPHONE ENCOUNTER
Patient contacted, she is was feeling fine yesterday after the procedure then woke up today and has been having pain.  She feels like going back to bed and clocking off of work.  Told her that she should get evaluated immediately and up contacted ER for CT scan and lab workup.  Clinical information given to ER.    The patient does not have a car currently but will see what she can do to get over here.  Discussed the importance of evaluation.  Discussed the risks i.e. perforation or other medical issues and she understands.

## 2025-01-10 NOTE — TELEPHONE ENCOUNTER
Patient contacted, she is feeling better today.  She feels it may have been her back or sciatica that it flared up, she did not go to the emergency room.    To call or follow-up with any ongoing issues or problems.

## 2025-01-10 NOTE — TELEPHONE ENCOUNTER
From: Pasha Burrows  To:  Gale Bray DO  Sent: 1/24/2022 12:32 PM CST  Subject: Claim Denial         Good afternoon Dr. Porfirio Craig, I just spoke with the Counselor, Marcela Bradford if [facility], she called to inform Me the Claim has been denied 144.1

## 2025-01-17 ENCOUNTER — TELEPHONE (OUTPATIENT)
Facility: CLINIC | Age: 60
End: 2025-01-17

## 2025-01-17 RX ORDER — CIPROFLOXACIN 500 MG/1
500 TABLET, FILM COATED ORAL 2 TIMES DAILY
Qty: 20 TABLET | Refills: 0 | Status: SHIPPED | OUTPATIENT
Start: 2025-01-17

## 2025-01-17 RX ORDER — METRONIDAZOLE 500 MG/1
500 TABLET ORAL 3 TIMES DAILY
Qty: 30 TABLET | Refills: 0 | Status: SHIPPED | OUTPATIENT
Start: 2025-01-17 | End: 2025-01-27

## 2025-01-17 NOTE — TELEPHONE ENCOUNTER
Patient states that she has been having abdominal pain and has history of diverticulitis.  Patient is asking for prescription for antibiotics.  Please call.      Prescription should go to [facility].

## 2025-01-17 NOTE — TELEPHONE ENCOUNTER
Dr. Dickinson  I spoke to Angi about her symptoms.  She reports that her pain is similar to when had diverticulitis in the past asking for antibiotics because Flagyl helped last time.  Pain in \"pit of stomach\" radiates around whole abdomen.  This pain started last Friday  She rates this pain a 6/10  Denies vomiting, diarrhea, constipation, fever, or chills  I told her to go to ER if severe symptoms like fever, chills, severe pain.  Please advise  Thank you

## 2025-01-17 NOTE — TELEPHONE ENCOUNTER
Patient contacted, she does have bloating cramping pain which she has had intermittently in the past, she reports this comes and goes.  She does have bloating and gas.  She has had diverticulitis like symptoms in the past and been treated with antibiotics and that tends to help.  Also sounds like she may have a component of SIBO.  We discussed antibiotic trial.  Plan a trial of Cipro and Flagyl x 10 days.  Should give us an update early next week by: Call or MyChart message.  ER if severe symptoms or contact GI on-call through the weekend.    Proper use of medication potential side effects were addressed.    Sent to [facility] per request.

## 2025-01-20 RX ORDER — AMLODIPINE BESYLATE 10 MG/1
10 TABLET ORAL DAILY
Qty: 90 TABLET | Refills: 3 | Status: SHIPPED | OUTPATIENT
Start: 2025-01-20

## 2025-01-29 DIAGNOSIS — F41.8 INSOMNIA SECONDARY TO DEPRESSION WITH ANXIETY: ICD-10-CM

## 2025-01-29 DIAGNOSIS — G43.001 MIGRAINE WITHOUT AURA AND WITH STATUS MIGRAINOSUS, NOT INTRACTABLE: ICD-10-CM

## 2025-01-29 DIAGNOSIS — F51.05 INSOMNIA SECONDARY TO DEPRESSION WITH ANXIETY: ICD-10-CM

## 2025-01-29 NOTE — TELEPHONE ENCOUNTER
Patient was seen on 12/7/2024 for pelvic and abdominal pain.  Prescription for naproxen 250mg, #30 was sent to the pharmacy.  Message to Ashlie Sanderson for recommendations on refill.  Med pended.

## 2025-01-30 RX ORDER — NAPROXEN 250 MG/1
250 TABLET ORAL 2 TIMES DAILY WITH MEALS
Qty: 30 TABLET | Refills: 0 | OUTPATIENT
Start: 2025-01-30

## 2025-02-01 ENCOUNTER — TELEPHONE (OUTPATIENT)
Dept: FAMILY MEDICINE CLINIC | Facility: CLINIC | Age: 60
End: 2025-02-01

## 2025-02-01 DIAGNOSIS — M54.41 CHRONIC RIGHT-SIDED LOW BACK PAIN WITH RIGHT-SIDED SCIATICA: Primary | ICD-10-CM

## 2025-02-01 DIAGNOSIS — G89.29 CHRONIC RIGHT-SIDED LOW BACK PAIN WITH RIGHT-SIDED SCIATICA: Primary | ICD-10-CM

## 2025-02-01 DIAGNOSIS — F51.05 INSOMNIA SECONDARY TO DEPRESSION WITH ANXIETY: ICD-10-CM

## 2025-02-01 DIAGNOSIS — G43.001 MIGRAINE WITHOUT AURA AND WITH STATUS MIGRAINOSUS, NOT INTRACTABLE: ICD-10-CM

## 2025-02-01 DIAGNOSIS — F41.8 INSOMNIA SECONDARY TO DEPRESSION WITH ANXIETY: ICD-10-CM

## 2025-02-01 RX ORDER — METHYLPREDNISOLONE 4 MG/1
TABLET ORAL
Qty: 1 EACH | Refills: 0 | OUTPATIENT
Start: 2025-02-01

## 2025-02-01 NOTE — TELEPHONE ENCOUNTER
Please review refill failed/no protocol     Requested Prescriptions     Pending Prescriptions Disp Refills    Zolpidem Tartrate ER 12.5 MG Oral Tab CR 30 tablet 0     Sig: Take 1 tablet (12.5 mg total) by mouth nightly as needed for Sleep.    traMADol 50 MG Oral Tab 30 tablet 0     Sig: Take 1 tablet (50 mg total) by mouth every 6 (six) hours as needed for Pain.         Recent Visits  Date Type Provider Dept   05/09/24 Office Visit Gera Dey, DO [facility]   02/24/24 Office Visit Weiler, Colleen M, DO [facility]   01/03/24 Office Visit Weiler, Colleen M, DO [facility]   Showing recent visits within past 540 days with a meds authorizing provider and meeting all other requirements  Future Appointments  No visits were found meeting these conditions.  Showing future appointments within next 150 days with a meds authorizing provider and meeting all other requirements    Requested Prescriptions   Pending Prescriptions Disp Refills    Zolpidem Tartrate ER 12.5 MG Oral Tab CR 30 tablet 0     Sig: Take 1 tablet (12.5 mg total) by mouth nightly as needed for Sleep.       Controlled Substance Medication Failed - 2/1/2025  5:58 AM        Failed - This medication is a controlled substance - forward to provider to refill        Passed - Medication is active on med list          traMADol 50 MG Oral Tab 30 tablet 0     Sig: Take 1 tablet (50 mg total) by mouth every 6 (six) hours as needed for Pain.       Controlled Substance Medication Failed - 2/1/2025  5:58 AM        Failed - This medication is a controlled substance - forward to provider to refill        Passed - Medication is active on med list

## 2025-02-01 NOTE — TELEPHONE ENCOUNTER
Patient says Dr. Dey sent her a message that medications were sent but went and they were not there. Wants to speak to nurse.

## 2025-02-02 RX ORDER — TRAMADOL HYDROCHLORIDE 50 MG/1
50 TABLET ORAL EVERY 6 HOURS PRN
Qty: 30 TABLET | Refills: 0 | Status: SHIPPED | OUTPATIENT
Start: 2025-02-02

## 2025-02-02 RX ORDER — ZOLPIDEM TARTRATE 12.5 MG/1
12.5 TABLET, FILM COATED, EXTENDED RELEASE ORAL NIGHTLY PRN
Qty: 30 TABLET | Refills: 5 | Status: SHIPPED | OUTPATIENT
Start: 2025-02-02

## 2025-02-04 RX ORDER — METHYLPREDNISOLONE 4 MG/1
TABLET ORAL
Qty: 21 EACH | Refills: 0 | Status: SHIPPED | OUTPATIENT
Start: 2025-02-04

## 2025-02-04 NOTE — TELEPHONE ENCOUNTER
The medrol pack shouldn't be used frequently.  If the pain is reoccurring, it may be better to get to the source of it.  I did a referral to Physiatry. She may need an injection or PT

## 2025-02-04 NOTE — TELEPHONE ENCOUNTER
Spoke to patient and relayed Dr. Weiler's message below. Patient verbalized understanding and had no further questions.

## 2025-02-04 NOTE — TELEPHONE ENCOUNTER
Dr Weiler =pended prescription, thanks.   Dr Dey =please disregard, wrongly sent .     Per patient, her pharmacy received the tramadol BUT not the prednisone. Her preferred pharmacy is [facility] in [place]. Her sciatica on the right side has flared up and it has been almost 2 weeks since this coming Thursday. She was prescribed a medrol pack by Dr. Weiler last month and it is helping her. She is requesting a refill.    Per chart review, Dr Weiler sent the medrol pack on 1/1/25 due to right side sciatica..     Future Appointments   Date Time Provider Department Center   2/20/2025 10:00 AM Alka Gilbert MD [facility]

## 2025-02-04 NOTE — TELEPHONE ENCOUNTER
You are receiving this message because you were on call 25, please advise what advice was given to the patient. Please sign the encounter after you document if there is no further action needed.     Message # [phone]         2025 06:44p   [KAVIN]  To:  From:  ANAHY Donnelly MD:  Phone#:  ----------------------------------------------------------------------  SOMMER FIORE   65  RE PT IS AT PHARMACY WAITING FOR SCRIPT BUT NO SCRIPTS ARE THERE  [phone] Paged at number :  PAGE: [phone] at :  18:44          (Message Delivered)   D E L I V E R I E S :  2025 06:44p           KAVIN Sheikh

## 2025-02-05 RX ORDER — TRAMADOL HYDROCHLORIDE 50 MG/1
50 TABLET ORAL EVERY 6 HOURS PRN
Qty: 30 TABLET | Refills: 0 | OUTPATIENT
Start: 2025-02-05

## 2025-02-05 RX ORDER — ZOLPIDEM TARTRATE 12.5 MG/1
12.5 TABLET, FILM COATED, EXTENDED RELEASE ORAL NIGHTLY PRN
Qty: 30 TABLET | Refills: 0 | OUTPATIENT
Start: 2025-02-05

## 2025-02-11 NOTE — TELEPHONE ENCOUNTER
Called patient back immediately after being paged and informed her that these are controlled substances she would need to reach out to her doctor on Monday

## 2025-02-20 ENCOUNTER — OFFICE VISIT (OUTPATIENT)
Dept: OBGYN CLINIC | Facility: CLINIC | Age: 60
End: 2025-02-20
Payer: COMMERCIAL

## 2025-02-20 VITALS
HEART RATE: 71 BPM | WEIGHT: 205.38 LBS | DIASTOLIC BLOOD PRESSURE: 76 MMHG | BODY MASS INDEX: 34 KG/M2 | SYSTOLIC BLOOD PRESSURE: 119 MMHG

## 2025-02-20 DIAGNOSIS — N94.12 DEEP DYSPAREUNIA: ICD-10-CM

## 2025-02-20 DIAGNOSIS — Z01.419 ENCOUNTER FOR GYNECOLOGICAL EXAMINATION WITHOUT ABNORMAL FINDING: Primary | ICD-10-CM

## 2025-02-20 DIAGNOSIS — N94.9 CERVICAL MOTION TENDERNESS: ICD-10-CM

## 2025-02-20 DIAGNOSIS — R10.2 PELVIC CRAMPING: ICD-10-CM

## 2025-02-20 PROCEDURE — 3078F DIAST BP <80 MM HG: CPT | Performed by: OBSTETRICS & GYNECOLOGY

## 2025-02-20 PROCEDURE — 3074F SYST BP LT 130 MM HG: CPT | Performed by: OBSTETRICS & GYNECOLOGY

## 2025-02-20 PROCEDURE — 99386 PREV VISIT NEW AGE 40-64: CPT | Performed by: OBSTETRICS & GYNECOLOGY

## 2025-02-20 PROCEDURE — 99203 OFFICE O/P NEW LOW 30 MIN: CPT | Performed by: OBSTETRICS & GYNECOLOGY

## 2025-02-21 NOTE — PROGRESS NOTES
Angi Sarkar is a 59 year old female  No LMP recorded. (Menstrual status: Supracervical Hysterectomy). who presents for   Chief Complaint   Patient presents with    Gyn Exam     ANNUAL EXAM     C/o lower pelvic cramping x 1 year- she is s/p supracervical hysterectomy.  She denies changes in bowel or bladder habits.  She also has deep dyspareunia and increased pain with orgasm- she takes toradol for the pain( she has this for HA's).  She had a negative pelvic ultrasound with [facility] last year.      OBSTETRICS HISTORY:  OB History    Para Term  AB Living   3 3 3 0 0 3   SAB IAB Ectopic Multiple Live Births   0 0 0 0 3       GYNE HISTORY:        MEDICAL HISTORY:  Past Medical History:    Allergy    seasonal allergies    BRCA negative    done at [facility]    Calculus of kidney    Colon polyp    Diverticular disease    Can’t remember… Found out 3 years ago    Esophageal reflux    Fibroid, uterine    fibroids [women's health].   total abdominal hysterectomy    Gastritis    High blood pressure    High cholesterol    History of stomach ulcers    Hx of motion sickness    Hyperlipidemia    IBS (irritable bowel syndrome)    Can’t remember    Left leg injury    severe lt leg injury/laceration.  surgical repair    Migraine headache    migraine headaches    Migraines    PONV (postoperative nausea and vomiting)    Right nephrolithiasis    \"right right kidney stones\"  2013    Visual impairment    Reading glasses       SURGICAL HISTORY:  Past Surgical History:   Procedure Laterality Date    Cholecystectomy      Colonoscopy N/A 2020    Procedure: COLONOSCOPY;  Surgeon: Eren Dickinson MD;  Location: [facility] ENDOSCOPY    Colonoscopy  2025    polyp, diverticulosis, hemorrhoids    Colonoscopy N/A 2025    Procedure: COLONOSCOPY;  Surgeon: Eren Dickinson MD;  Location: [facility] ENDO    Foot surgery Right 2014    [facility] supracervical abdominal hysterectomy  2001    fibroids, pt  states partial bowel resection    Hemorrhoidectomy  06/16/2015    PPH hemorrhoidectomy with rectal mucosal proctopexy    Leg/ankle surgery proc unlisted Left 1970    surgical repair, severe lt leg injury/laceration    Myomectomy 5/> intramural myomas &/or total wt >250 gms, abdominal approach  1999    Removal of ovarian cyst(s)  1998 1997, 1998    Upper gi endoscopy,biopsy  08/28/2020       SOCIAL HISTORY:  Social History     Socioeconomic History    Marital status:     Number of children: 3   Occupational History    Occupation: Collections   Tobacco Use    Smoking status: Never    Smokeless tobacco: Never    Tobacco comments:     Don't Smoke   Vaping Use    Vaping status: Never Used   Substance and Sexual Activity    Alcohol use: No    Drug use: No    Sexual activity: Yes     Birth control/protection: Hysterectomy     Comment: supracervical  hysterectomy   Other Topics Concern    Caffeine Concern No         FAMILY HISTORY:  Family History   Problem Relation Age of Onset    Hypertension Father     Hypertension Mother     Breast Cancer Mother 52    Prostate Cancer Maternal Grandfather 60    Colon Cancer Maternal Grandfather     Cancer Maternal Grandfather     Colon Polyps Maternal Grandfather     Breast Cancer Paternal Grandmother         70's    Cancer Paternal Grandmother     Prostate Cancer Paternal Grandfather     Colon Cancer Paternal Grandfather     Breast Cancer Sister 45    Ovarian Cancer Neg        MEDICATIONS:    Current Outpatient Medications:     methylPREDNISolone 4 MG Oral Tablet Therapy Pack, FOLLOW PACKAGE DIRECTIONS, Disp: 21 each, Rfl: 0    Zolpidem Tartrate ER 12.5 MG Oral Tab CR, Take 1 tablet (12.5 mg total) by mouth nightly as needed for Sleep., Disp: 30 tablet, Rfl: 5    traMADol 50 MG Oral Tab, Take 1 tablet (50 mg total) by mouth every 6 (six) hours as needed for Pain., Disp: 30 tablet, Rfl: 0    amLODIPine 10 MG Oral Tab, Take 1 tablet (10 mg total) by mouth daily., Disp: 90  tablet, Rfl: 3    ciprofloxacin 500 MG Oral Tab, Take 1 tablet (500 mg total) by mouth 2 (two) times daily. Take every 12 hours, Disp: 20 tablet, Rfl: 0    lansoprazole 30 MG Oral Capsule Delayed Release, Take 1 capsule (30 mg total) by mouth before breakfast., Disp: 90 capsule, Rfl: 0    triamcinolone 0.1 % External Cream, Apply topically 2 (two) times daily. - Topical, Disp: 80 g, Rfl: 3    hydroCHLOROthiazide 12.5 MG Oral Tab, Take 1 tablet (12.5 mg total) by mouth daily., Disp: 90 tablet, Rfl: 3    atorvastatin 40 MG Oral Tab, Take 1 tablet (40 mg total) by mouth nightly., Disp: 90 tablet, Rfl: 1    cetirizine 10 MG Oral Tab, Take 1 tablet (10 mg total) by mouth daily., Disp: 90 tablet, Rfl: 3    dicyclomine 10 MG Oral Cap, Take 1 capsule (10 mg total) by mouth 3 (three) times daily as needed., Disp: 30 capsule, Rfl: 1    Multiple Vitamins-Minerals (CENTRUM) Oral Liquid, Take  by mouth., Disp: , Rfl:     aspirin 81 MG Oral Chew Tab, Chew 1 tablet (81 mg total) by mouth daily., Disp: , Rfl:     naproxen 250 MG Oral Tab, Take 1 tablet (250 mg total) by mouth 2 (two) times daily with meals. (Patient not taking: Reported on 2/20/2025), Disp: 30 tablet, Rfl: 0    ALLERGIES:  Allergies[1]      Review of Systems:  Constitutional:  Denies fatigue, night sweats, hot flashes  Eyes:  denies blurred or double vision  Cardiovascular:  denies chest pain or palpitations  Respiratory:  denies shortness of breath  Gastrointestinal:  denies heartburn, abdominal pain, diarrhea or constipation  Genitourinary:  denies dysuria, incontinence, abnormal vaginal discharge, vaginal itching  Musculoskeletal:  denies back pain.  Skin/Breast:  Denies any breast pain, lumps, or discharge.   Neurological:  denies headaches, extremity weakness or numbness.  Psychiatric: denies depression or anxiety.  Endocrine:   denies excessive thirst or urination.  Heme/Lymph:  denies history of anemia, easy bruising or bleeding.    Depression Screening  (PHQ-2/PHQ-9): Over the LAST 2 WEEKS   Little interest or pleasure in doing things: Not at all    Feeling down, depressed, or hopeless: Not at all    PHQ-2 SCORE: 0         Blood pressure 119/76, pulse 71, weight 205 lb 6.4 oz (93.2 kg).    PHYSICAL EXAM:   Constitutional: well developed, well nourished  Head/Face: normocephalic  Neck/Thyroid: thyroid symmetric, no thyromegaly, no nodules, no adenopathy  Lymphatic:no abnormal supraclavicular or axillary adenopathy is noted  Breast: normal without palpable masses, tenderness, asymmetry, nipple discharge, nipple retraction or skin changes  Respiratory:  lungs clear to auscultation bilaterally  Cardiovascular: regular rate and rhythm, no significant murmur  Abdomen:  soft, nontender, nondistended, no masses  Skin/Hair: no unusual rashes or bruises  Extremities: no edema, no cyanosis  Psychiatric:  Oriented to time, place, person and situation. Appropriate mood and affect    Pelvic Exam:  External Genitalia: normal appearance, hair distribution, and no lesions  Urethral Meatus:  normal in size, location, without lesions and prolapse  Bladder:  No fullness, masses or tenderness  Vagina:  Normal appearance without lesions, no abnormal discharge  Cervix:  Normal appearance, ++tenderness with movement, pain 8/10 for patient   Uterus: surgically absent  Adnexa: normal without masses or tenderness  Perineum: normal  Rectovaginal: no masses, normal tone  Anus: no thrombosed or ulcerated hemorroids    Assessment & Plan:   ASCCP guidelines discussed,cotest done- [id number] in 3 years,mammogram ordered,rtc 1 year for annual exam   SBE encouraged  Will get CT scan of pelvis, discussed possible robotic trachelectomy- most likely will need to be referred to gyne onc for this due to multiple abdominal surgeries with significant adhesions per patient report. 15 minutes spent on review of records and imaging and discussion with patient regarding this issue.  Encounter  Diagnoses   Name Primary?    Encounter for gynecological examination without abnormal finding Yes    Pelvic cramping     Deep dyspareunia     Cervical motion tenderness      No orders of the defined types were placed in this encounter.    Requested Prescriptions      No prescriptions requested or ordered in this encounter     None                [1]   Allergies  Allergen Reactions    Morphine HIVES    Toradol [Ketorolac Tromethamine] OTHER (SEE COMMENTS)     Upset stomach per pt

## 2025-02-24 ENCOUNTER — TELEPHONE (OUTPATIENT)
Dept: OBGYN CLINIC | Facility: CLINIC | Age: 60
End: 2025-02-24

## 2025-02-24 NOTE — TELEPHONE ENCOUNTER
Patient was seen in office last week. Was told that someone from the office would reach out to her insurance about getting a CT scan. Please call to discuss.

## 2025-02-24 NOTE — TELEPHONE ENCOUNTER
\"Will get CT scan of pelvis, discussed possible robotic trachelectomy- most likely will need to be referred to gyne onc for this due to multiple abdominal surgeries with significant adhesions per patient report. 15 minutes spent on review of records and imaging and discussion with patient regarding this issue.\" Per office notes 2/20/2025    Patient calling regarding insurance approval for her testing. Informed that once office places order she needs to schedule imagining and Managed [facility] with obtain her prior authorization. She states Dr. Gilbert told her someone for the office will do that. Informed as of the new year the hospital now has it's own referral department in place. She states understanding.     She is asking why Dr. Gilbert would want another CT scan, she was under the impression it was an MRI. She recent had a CT in 12/2024. She is asking for clarification from Dr. Gilbert.     To Dr. Gilbert please review and advise. Thank  you.

## 2025-02-25 NOTE — TELEPHONE ENCOUNTER
Angi returning call regarding order. Informed message was routed to Dr. Gilbert for review. She is noticing some increase \"throbbing\" pain in her left ovary. She feels it is affecting her sciatic nerve. She is taking Ibuprofen per Dr. Gilbert recommendations, having some relief. Pain precautions discussed. Informed will route message to Dr. Gilbert again today and address it with her once she is in office. She is appreciative.

## 2025-02-26 ENCOUNTER — TELEPHONE (OUTPATIENT)
Dept: OBGYN CLINIC | Facility: CLINIC | Age: 60
End: 2025-02-26

## 2025-02-26 NOTE — TELEPHONE ENCOUNTER
Patient is calling in pain inga tee was to be putting in corrected order ,  asking if its done so she can get test done

## 2025-02-26 NOTE — TELEPHONE ENCOUNTER
Angi calling to follow-up. She states pain unchanged since yesterday, see notes below.   Today she states she is taking motrin 600 mg every 5-6 hours as needed. Yesterday she took 1 tramadol that she had on hand for another issue and that took the edge off. She does not feel  pain is well managed on motrin.     She is seeking update on imaging order. She states she does not want CT scan since just done Dec 2024. Asking if order for MRI will be placed.     To Dr. Gilbert to advise on imaging orders. She is also asking if anything else can be given for pain.

## 2025-02-26 NOTE — TELEPHONE ENCOUNTER
Received call from Dr. Gilbert at [facility]- she discussed with radiology and MRI would not give any additional information compared to previous CT, and MRI tends to be expensive.   She does not suggest repeat CT scan.  At this point recommendations are for [facility]- Dr. Mckenzie or Dr. Heller.  For pain, should alternative with ES tylenol.     Discussed in detail with Angi.  Provided phone to [facility] for schedule- [phone].   Reviewed alternating motrin with tylenol for pain. Patient verbalized understanding.

## 2025-03-03 NOTE — LETTER
3/3/2023              [address] [place] [address]        Fredy Garcia [id number]         Dear Anatoly White records indicate that the tests ordered for you by Pretty Avila. Malou Thomas MD  have not been done. US GALLBLADDER ([id number]) (Order #[id number]) on 3/15/22  Please call [phone] to schedule your Ultrasound. If you have, in fact, already completed the tests or you do not wish to have the tests done, please contact our office at [address] [place] [address]. Otherwise, please proceed with the testing. Sincerely,    Pretty Avila.  Malou Thomas MD  [facility], [address] [address] [phone]
no

## 2025-03-10 ENCOUNTER — TELEPHONE (OUTPATIENT)
Dept: OBGYN CLINIC | Facility: CLINIC | Age: 60
End: 2025-03-10

## 2025-03-10 RX ORDER — LANSOPRAZOLE 30 MG/1
30 CAPSULE, DELAYED RELEASE ORAL
Qty: 90 CAPSULE | Refills: 0 | Status: SHIPPED | OUTPATIENT
Start: 2025-03-10

## 2025-03-10 NOTE — TELEPHONE ENCOUNTER
Requested Prescriptions     Pending Prescriptions Disp Refills    LANSOPRAZOLE 30 MG Oral Capsule Delayed Release [Pharmacy Med Name: Lansoprazole Oral Capsule Delayed Release 30 MG] 90 capsule 0     Sig: TAKE 1 CAPSULE BY MOUTH EVERY DAY BEFORE BREAKFAST       LOV  6/08/2023  Last colonoscopy done 1/07/2025  LR  1/07/2025      Dr Dickinson,   Patient is requesting a 90 day supply be sent to [facility] pharmacy in [place] on file.

## 2025-03-15 DIAGNOSIS — L20.9 ATOPIC DERMATITIS, UNSPECIFIED TYPE: ICD-10-CM

## 2025-03-15 DIAGNOSIS — F51.05 INSOMNIA SECONDARY TO DEPRESSION WITH ANXIETY: ICD-10-CM

## 2025-03-15 DIAGNOSIS — F41.8 INSOMNIA SECONDARY TO DEPRESSION WITH ANXIETY: ICD-10-CM

## 2025-03-15 DIAGNOSIS — G43.001 MIGRAINE WITHOUT AURA AND WITH STATUS MIGRAINOSUS, NOT INTRACTABLE: ICD-10-CM

## 2025-03-18 RX ORDER — ATORVASTATIN CALCIUM 40 MG/1
40 TABLET, FILM COATED ORAL NIGHTLY
Qty: 90 TABLET | Refills: 3 | Status: SHIPPED | OUTPATIENT
Start: 2025-03-18

## 2025-03-18 NOTE — TELEPHONE ENCOUNTER
Refill Per Protocol     Requested Prescriptions   Pending Prescriptions Disp Refills    atorvastatin 40 MG Oral Tab 90 tablet 1     Sig: Take 1 tablet (40 mg total) by mouth nightly.       Cholesterol Medication Protocol Passed - 3/18/2025  4:25 PM        Passed - ALT < 80     Lab Results   Component Value Date    ALT 22 12/07/2024             Passed - ALT resulted within past year        Passed - Lipid panel within past 12 months     Lab Results   Component Value Date    CHOLEST 259 (H) 12/07/2024    TRIG 86 12/07/2024    HDL 63 (H) 12/07/2024     (H) 12/07/2024    VLDL 17 12/07/2024    NONHDLC 196 (H) 12/07/2024             Passed - In person appointment or virtual visit in the past 12 mos or appointment in next 3 mos     Recent Outpatient Visits              3 weeks ago Encounter for gynecological examination without abnormal finding    [facility] Alka Gilbert MD    Office Visit    3 months ago Pelvic pain    [facility] Ashlie Sanderson APRN    Office Visit    10 months ago Melena    [facility] Gera Dey, DO    Office Visit    1 year ago Encounter for annual routine gynecological examination    [facility] Weiler, Colleen M, DO    Office Visit    1 year ago Gluteal pain    [facility] Weiler, Colleen M, DO    Office Visit          Future Appointments         Provider Department Appt Notes    In 3 weeks  MR RM4 (3T WIDE) [facility] MRI     In 3 weeks Behar, Alex, MD [facility] Sciatica                    Passed - Medication is active on med list

## 2025-03-19 RX ORDER — TRIAMCINOLONE ACETONIDE 1 MG/G
CREAM TOPICAL
Qty: 80 G | Refills: 3 | OUTPATIENT
Start: 2025-03-19

## 2025-03-19 RX ORDER — TRAMADOL HYDROCHLORIDE 50 MG/1
50 TABLET ORAL EVERY 6 HOURS PRN
Qty: 30 TABLET | Refills: 0 | Status: SHIPPED | OUTPATIENT
Start: 2025-03-19

## 2025-03-19 RX ORDER — ZOLPIDEM TARTRATE 12.5 MG/1
12.5 TABLET, FILM COATED, EXTENDED RELEASE ORAL NIGHTLY PRN
Qty: 30 TABLET | Refills: 5 | Status: SHIPPED | OUTPATIENT
Start: 2025-03-19

## 2025-03-19 NOTE — TELEPHONE ENCOUNTER
Please kindly review this medication    [x] FAILS PROTOCOL            [x] Controlled Substance             [] Medication not previously prescribed by Provider            [] Due for appointment- no future appointment scheduled            [] BP reading            [] Labs            [] Depression Screening            [] Asthma (ACT recorded/ACT score)    [] HAS NO PROTOCOL ATTACHED     Recent fill dates: 2/2/25, and 10/23/24  Date of  last written prescription: 2/2/25   Last written quantity: #30 each and processed as a 30 day supply  LAST OFFICE VISIT: 5/9/2024  [x] Takes as needed  [] Takes scheduled daily

## 2025-03-27 ENCOUNTER — TELEPHONE (OUTPATIENT)
Dept: OBGYN CLINIC | Facility: CLINIC | Age: 60
End: 2025-03-27

## 2025-03-27 DIAGNOSIS — Z80.41 FAMILY HISTORY OF OVARIAN CANCER: Primary | ICD-10-CM

## 2025-03-27 NOTE — TELEPHONE ENCOUNTER
Patient continues to have abdominal pain near her ovaries. Would like to discuss best course of action with that in mind considering recent imaging results. Please call.

## 2025-03-27 NOTE — TELEPHONE ENCOUNTER
Patient states she had an MRI done on 3/22/2025.  Patient was told everything was fine.  Patient is still having bilateral pain near her ovaries.  Patient states Dr. Gilbert told her it might just be the muscles causing pain.  Patient states cancer runs in her family and she would like her ovaries and cervix removed.  Patient also thinks this may help with her pain.  Patient states she has been in pain for over a year.  She also has bad cramping with BMs.  Patient did see GI.  Patient would like Dr. Gilbert to review MRI.  Message to Dr. Gilbert.  MRI results below.        1.  Supracervical hysterectomy without suspicious signal in the surgical bed or  the cervix.  2.  Normal-sized ovaries without cysts or adnexal lesions.  3.  Incidentally noted, L5-S1 disc herniation with mild inferior migration,  abutting the right S1 nerve root. Mild left neuroforaminal stenosis at this  level.    Resident/Fellow: Roland Martel MD 3/24/2025 10:16 AM    This report is dictated with a resident or fellow. I personally reviewed the  study and interpretation, made necessary changes, and agree with the findings  documented in the report.    Attending: Sahil Wang DO 3/24/2025 11:11 AM  Narrative    This result has an attachment that is not available.  EXAM: MRI PELVIS W AND WO IV CONTRAST    CLINICAL HISTORY: History of supracervical hysterectomy for uterine fibroids.  History of bilateral ovarian cysts status post bilateral ovarian cystectomy.  Symptoms of dyspareunia and bilateral inguinal pain.    COMPARISON: None    TECHNIQUE:  MRI of the Pelvis performed before and after administration of  intravenous gadolinium contrast. MRI of the pelvis was performed according to  the female pelvis (adnexal) protocol.      FINDINGS:    Status post supracervical hysterectomy. No suspicious signal in the surgical  bed. The remaining cervix is also unremarkable. No lesions.    Right ovary: Normal in size, without suspicious lesions.  Left ovary:  Normal in size, without suspicious lesions.      OTHER FINDINGS:    Lymph nodes: No enlarged lymph nodes.    Vessels: Normal caliber of iliac vessels.    Bowel/Peritoneal cavity: A limited portion of the peritoneal cavity is  visualized. Nondilated bowel loops.  No free fluid.    Bladder: No focal abnormality.    Pelvic wall: Postsurgical changes in the ventral abdomen.    Bones: No suspicious osseous lesions.  There is mild degenerative disc disease  at L5-S1 with a right paracentral disc extrusion at L5-S1, with slight inferior  migration (#2/20) this abuts the exiting right S1 nerve root. There is also mild  left neuroforaminal stenosis. No right neuroforaminal stenosis.  Procedure Note    SaurabhSahil webb, DO - 03/24/2025  Formatting of this note might be different from the original.  EXAM: MRI PELVIS W AND WO IV CONTRAST    CLINICAL HISTORY: History of supracervical hysterectomy for uterine fibroids.  History of bilateral ovarian cysts status post bilateral ovarian cystectomy.  Symptoms of dyspareunia and bilateral inguinal pain.    COMPARISON: None    TECHNIQUE:  MRI of the Pelvis performed before and after administration of  intravenous gadolinium contrast. MRI of the pelvis was performed according to  the female pelvis (adnexal) protocol.      FINDINGS:    Status post supracervical hysterectomy. No suspicious signal in the surgical  bed. The remaining cervix is also unremarkable. No lesions.    Right ovary: Normal in size, without suspicious lesions.  Left ovary: Normal in size, without suspicious lesions.      OTHER FINDINGS:    Lymph nodes: No enlarged lymph nodes.    Vessels: Normal caliber of iliac vessels.    Bowel/Peritoneal cavity: A limited portion of the peritoneal cavity is  visualized. Nondilated bowel loops.  No free fluid.    Bladder: No focal abnormality.    Pelvic wall: Postsurgical changes in the ventral abdomen.    Bones: No suspicious osseous lesions.  There is mild degenerative disc  disease  at L5-S1 with a right paracentral disc extrusion at L5-S1, with slight inferior  migration (#2/20) this abuts the exiting right S1 nerve root. There is also mild  left neuroforaminal stenosis. No right neuroforaminal stenosis.      IMPRESSION:  1.  Supracervical hysterectomy without suspicious signal in the surgical bed or  the cervix.  2.  Normal-sized ovaries without cysts or adnexal lesions.  3.  Incidentally noted, L5-S1 disc herniation with mild inferior migration,  abutting the right S1 nerve root. Mild left neuroforaminal stenosis at this  level.

## 2025-03-29 NOTE — TELEPHONE ENCOUNTER
I have reviewed her MRI and her cervix and ovaries are both normal and very unlikely to be causing the pain that she is experiencing or to be a source of cancer at this time. Removing the cervix after hysterectomy(Trachelectomy) is not a simple procedure and can be quite complicated.   Therefore we usually refer patients to gyne oncology for this to be be done robotically.  If she is interested in this then we can refer her to Dr Mckenzie or Dr Heller at [facility] as we work with them frequently.  Otherwise there is Dr Dueñas or Dr Blake here at [facility] that we can refer her to for consultation.

## 2025-03-29 NOTE — TELEPHONE ENCOUNTER
Angi notified of Dr. Gilbert's message below. She states she recently saw Dr. Mckenzie, who is the one that ordered MRI and said it was normal. She is nervous to keep ovaries, even if report is normal. She reviewed her extensive family history of cancer.   I offered option for second opinion with Gyne Onc.   She also does not believe she ever saw Genetic Counseling.   To Dr. Gilbert to advise if referral would be suggested?

## 2025-03-31 NOTE — TELEPHONE ENCOUNTER
She can be referred to genetic counseling if she would like.  Please give her contact information for [facility]

## 2025-04-14 ENCOUNTER — HOSPITAL ENCOUNTER (OUTPATIENT)
Dept: GENERAL RADIOLOGY | Facility: HOSPITAL | Age: 60
Discharge: HOME | End: 2025-04-14
Attending: PHYSICAL MEDICINE & REHABILITATION
Payer: COMMERCIAL

## 2025-04-14 ENCOUNTER — OFFICE VISIT (OUTPATIENT)
Dept: PHYSICAL MEDICINE AND REHAB | Facility: CLINIC | Age: 60
End: 2025-04-14
Payer: COMMERCIAL

## 2025-04-14 VITALS — WEIGHT: 205 LBS | BODY MASS INDEX: 34.16 KG/M2 | HEIGHT: 65 IN

## 2025-04-14 DIAGNOSIS — I10 ESSENTIAL HYPERTENSION: ICD-10-CM

## 2025-04-14 DIAGNOSIS — S76.019A STRAIN OF GLUTEUS MEDIUS, UNSPECIFIED LATERALITY, INITIAL ENCOUNTER: ICD-10-CM

## 2025-04-14 DIAGNOSIS — M48.061 LUMBAR FORAMINAL STENOSIS: ICD-10-CM

## 2025-04-14 DIAGNOSIS — M70.61 GREATER TROCHANTERIC BURSITIS OF BOTH HIPS: ICD-10-CM

## 2025-04-14 DIAGNOSIS — M51.369 BULGE OF LUMBAR DISC WITHOUT MYELOPATHY: ICD-10-CM

## 2025-04-14 DIAGNOSIS — M54.59 MECHANICAL LOW BACK PAIN: ICD-10-CM

## 2025-04-14 DIAGNOSIS — M70.62 GREATER TROCHANTERIC BURSITIS OF BOTH HIPS: ICD-10-CM

## 2025-04-14 DIAGNOSIS — M47.816 FACET SYNDROME, LUMBAR: ICD-10-CM

## 2025-04-14 DIAGNOSIS — M99.9 BIOMECHANICAL LESION: Primary | ICD-10-CM

## 2025-04-14 DIAGNOSIS — M54.16 LUMBAR RADICULOPATHY: ICD-10-CM

## 2025-04-14 DIAGNOSIS — G43.001 MIGRAINE WITHOUT AURA AND WITH STATUS MIGRAINOSUS, NOT INTRACTABLE: ICD-10-CM

## 2025-04-14 DIAGNOSIS — M76.01 GLUTEAL TENDINITIS OF RIGHT BUTTOCK: ICD-10-CM

## 2025-04-14 DIAGNOSIS — M51.372 DEGENERATION OF INTERVERTEBRAL DISC OF LUMBOSACRAL REGION WITH DISCOGENIC BACK PAIN AND LOWER EXTREMITY PAIN: ICD-10-CM

## 2025-04-14 DIAGNOSIS — M47.816 LUMBAR SPONDYLOSIS: ICD-10-CM

## 2025-04-14 DIAGNOSIS — M79.10 MYALGIA: ICD-10-CM

## 2025-04-14 DIAGNOSIS — E78.5 HYPERLIPIDEMIA, UNSPECIFIED HYPERLIPIDEMIA TYPE: ICD-10-CM

## 2025-04-14 DIAGNOSIS — M99.9 BIOMECHANICAL LESION: ICD-10-CM

## 2025-04-14 PROCEDURE — 72110 X-RAY EXAM L-2 SPINE 4/>VWS: CPT | Performed by: PHYSICAL MEDICINE & REHABILITATION

## 2025-04-14 RX ORDER — TRAMADOL HYDROCHLORIDE 50 MG/1
50 TABLET ORAL EVERY 6 HOURS PRN
Qty: 30 TABLET | Refills: 0 | Status: SHIPPED | OUTPATIENT
Start: 2025-04-14

## 2025-04-14 RX ORDER — NAPROXEN 500 MG/1
500 TABLET ORAL 2 TIMES DAILY WITH MEALS
Qty: 60 TABLET | Refills: 0 | Status: SHIPPED | OUTPATIENT
Start: 2025-04-14
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
## 2025-04-14 NOTE — PATIENT INSTRUCTIONS
1) Please get X-rays of the Lumbar spine today on your way out.   2) Take Naprosyn 500 mg 1 tablet twice per day with food for the next two weeks and then as needed but no more than 2 tablets per day. Do not take with any other NSAIDS (Ibuprofen, Advil, Aleve, etc). OK to take Tylenol 500 mg every 6 hours as needed for pain. If you develop any side effects including stomach aches, nausea, vomiting, or other gastrointestinal symptoms, stop the medication and call my office.   3) Tylenol 500-1000 mg every 6-8 hours as needed for pain.  No more than 3000 mg daily.  4) Start Tramadol 50 mg every 6 hours as needed for pain.   5) Follow-up with me in 6 to 8 weeks after you  begin physical therapy. If symptoms do not improve, then I would recommend an MRI of the Lumbar spine  6) Begin physical therapy as soon as possible at [facility]

## 2025-04-14 NOTE — PROGRESS NOTES
{Vanishing suggested script  \"DrDonell [X] would like to utilize a tool that securely records the visit conversation to help write his/her medical notes so he/she can pay closer attention to you and less time on the computer\".:[id number]}     The following individual(s) verbally consented to be recorded using ambient AI listening technology and understand that they can each withdraw their consent to this listening technology at any point by asking the clinician to turn off or pause the recording:    Patient name: Angi Sarkar  Additional names:

## 2025-04-16 ENCOUNTER — OFFICE VISIT (OUTPATIENT)
Age: 60
End: 2025-04-16
Attending: FAMILY MEDICINE
Payer: COMMERCIAL

## 2025-04-16 ENCOUNTER — NURSE ONLY (OUTPATIENT)
Age: 60
End: 2025-04-16
Attending: FAMILY MEDICINE
Payer: COMMERCIAL

## 2025-04-16 ENCOUNTER — LAB ENCOUNTER (OUTPATIENT)
Dept: LAB | Facility: HOSPITAL | Age: 60
End: 2025-04-16
Attending: FAMILY MEDICINE
Payer: COMMERCIAL

## 2025-04-16 DIAGNOSIS — Z80.3 FAMILY HISTORY OF MALIGNANT NEOPLASM OF BREAST: Primary | ICD-10-CM

## 2025-04-16 DIAGNOSIS — D69.6 PLATELETS DECREASED: ICD-10-CM

## 2025-04-16 DIAGNOSIS — Z80.41 FAMILY HISTORY OF MALIGNANT NEOPLASM OF OVARY: ICD-10-CM

## 2025-04-16 DIAGNOSIS — E87.8 HYPERCHLOREMIA: ICD-10-CM

## 2025-04-16 DIAGNOSIS — E78.2 HYPERLIPIDEMIA, MIXED: ICD-10-CM

## 2025-04-16 DIAGNOSIS — Z80.42 FAMILY HISTORY OF MALIGNANT NEOPLASM OF PROSTATE: ICD-10-CM

## 2025-04-16 LAB
ALBUMIN SERPL-MCNC: 4.7 G/DL (ref 3.2–4.8)
ALBUMIN/GLOB SERPL: 2.2 {RATIO} (ref 1–2)
ALP LIVER SERPL-CCNC: 84 U/L (ref 46–118)
ALT SERPL-CCNC: 32 U/L (ref 10–49)
ANION GAP SERPL CALC-SCNC: 10 MMOL/L (ref 0–18)
AST SERPL-CCNC: 25 U/L (ref ?–34)
BASOPHILS # BLD AUTO: 0.03 X10(3) UL (ref 0–0.2)
BASOPHILS NFR BLD AUTO: 0.4 %
BILIRUB SERPL-MCNC: 0.6 MG/DL (ref 0.3–1.2)
BUN BLD-MCNC: 15 MG/DL (ref 9–23)
BUN/CREAT SERPL: 15.3 (ref 10–20)
CALCIUM BLD-MCNC: 9.3 MG/DL (ref 8.7–10.4)
CHLORIDE SERPL-SCNC: 109 MMOL/L (ref 98–112)
CHOLEST SERPL-MCNC: 171 MG/DL (ref ?–200)
CO2 SERPL-SCNC: 26 MMOL/L (ref 21–32)
CREAT BLD-MCNC: 0.98 MG/DL (ref 0.55–1.02)
DEPRECATED RDW RBC AUTO: 41.2 FL (ref 35.1–46.3)
EGFRCR SERPLBLD CKD-EPI 2021: 66 ML/MIN/1.73M2 (ref 60–?)
EOSINOPHIL # BLD AUTO: 0.22 X10(3) UL (ref 0–0.7)
EOSINOPHIL NFR BLD AUTO: 3 %
ERYTHROCYTE [DISTWIDTH] IN BLOOD BY AUTOMATED COUNT: 13.2 % (ref 11–15)
FASTING PATIENT LIPID ANSWER: YES
FASTING STATUS PATIENT QL REPORTED: YES
GLOBULIN PLAS-MCNC: 2.1 G/DL (ref 2–3.5)
GLUCOSE BLD-MCNC: 100 MG/DL (ref 70–99)
HCT VFR BLD AUTO: 40.8 % (ref 35–48)
HDLC SERPL-MCNC: 57 MG/DL (ref 40–59)
HGB BLD-MCNC: 13.3 G/DL (ref 12–16)
IMM GRANULOCYTES # BLD AUTO: 0.01 X10(3) UL (ref 0–1)
IMM GRANULOCYTES NFR BLD: 0.1 %
LDLC SERPL CALC-MCNC: 100 MG/DL (ref ?–100)
LYMPHOCYTES # BLD AUTO: 3.39 X10(3) UL (ref 1–4)
LYMPHOCYTES NFR BLD AUTO: 46.8 %
MCH RBC QN AUTO: 27.6 PG (ref 26–34)
MCHC RBC AUTO-ENTMCNC: 32.6 G/DL (ref 31–37)
MCV RBC AUTO: 84.6 FL (ref 80–100)
MONOCYTES # BLD AUTO: 0.52 X10(3) UL (ref 0.1–1)
MONOCYTES NFR BLD AUTO: 7.2 %
NEUTROPHILS # BLD AUTO: 3.07 X10 (3) UL (ref 1.5–7.7)
NEUTROPHILS # BLD AUTO: 3.07 X10(3) UL (ref 1.5–7.7)
NEUTROPHILS NFR BLD AUTO: 42.5 %
NONHDLC SERPL-MCNC: 114 MG/DL (ref ?–130)
OSMOLALITY SERPL CALC.SUM OF ELEC: 301 MOSM/KG (ref 275–295)
PLATELET # BLD AUTO: 285 10(3)UL (ref 150–450)
POTASSIUM SERPL-SCNC: 3.7 MMOL/L (ref 3.5–5.1)
PROT SERPL-MCNC: 6.8 G/DL (ref 5.7–8.2)
RBC # BLD AUTO: 4.82 X10(6)UL (ref 3.8–5.3)
SODIUM SERPL-SCNC: 145 MMOL/L (ref 136–145)
TRIGL SERPL-MCNC: 74 MG/DL (ref 30–149)
VLDLC SERPL CALC-MCNC: 12 MG/DL (ref 0–30)
WBC # BLD AUTO: 7.2 X10(3) UL (ref 4–11)

## 2025-04-16 PROCEDURE — 80053 COMPREHEN METABOLIC PANEL: CPT

## 2025-04-16 PROCEDURE — 80061 LIPID PANEL: CPT

## 2025-04-16 PROCEDURE — 85025 COMPLETE CBC W/AUTO DIFF WBC: CPT

## 2025-04-16 NOTE — PROGRESS NOTES
Reason for visit: Mrs. Grayson Sarkar is a 59-year-old woman who was referred for genetic counseling due to her family history of ovarian cancer and breast cancer.  She was seen for genetic counseling to discuss the option of genetic testing given her family history.  She is  and has three adult children and six grandchildren.  Mrs. Grayson Sarkar is employed as an . She has recently been concerned about pain in her abdominal area which she assigns to her ovaries.  Referring MD: Alka Gilbert MD  Relevant family history: Mrs. Grayson Sarkar has a significant family history of cancer on both sides of her family. She does share that she pursued genetic testing many years ago and believes the results were negative but recalls this was over ten years ago.  She explains that her elder daughter was diagnosed with breast cancer at age 43 and is doing well at age 44.  Her younger daughter was diagnosed with lymphoma at age 22 and is alive and well at age 38.  Her only sibling, a sister, was also diagnosed with breast cancer in her 40's and passed away at age 45.  On her maternal side of her family, her mother was diagnosed with breast cancer and passed away shortly following her diagnosis.  A maternal aunt was diagnosed with both breast and ovarian cancer and a maternal uncle passed away from an unknown malignancy.  Her maternal grandfather passed away from metastatic prostate cancer at age 69 and she has some additional more distant relatives on her maternal side with malignancies, including ovarian cancer.  On her paternal side, her father passed away from metastatic prostate cancer and one of his sisters was diagnosed with ovarian cancer, as was her daughter.  In addition, Mrs. Grayson Sarkar' paternal grandmother was diagnosed with both breast and ovarian cancer and her paternal grandfather with metastatic prostate cancer.  She does not believe any family members have pursued genetic testing to date.  She  is of -American ancestry on her paternal side of her family and of -American,  and  heritage on her maternal side of her family and she is not aware of any Ashkenazi Mandaeism heritage or consanguinity.  Her three children (a son and two daughters) as well as her grandchildren (five grandsons and a granddaughter) are healthy and well by her report aside from the histories of malignancy.  Medical history: Mrs. Grayson Sarkar has been consistent with breast imaging and does not have any current breast-related complaints. She denies any breast biopsies to date. Mrs. Grayson Sarkar has her ovaries intact although she had her uterus removed.  She has had several colonoscopies with polyps identified on each occasion and believes her cumulative total of polyps is >10.  She denies any current dermatological concerns, or thyroid issues but does have a personal history of uterine fibroids.    Other medical history of note: Mrs. Grayson Sarkar reports she was 12 at menarche and she was 15 at the birth of her eldest child.  Mrs. Grayson Sarkar denies any use of hormone replacement therapy or oral contraceptive use.  She denies any tobacco use or regular consumption of alcoholic beverages.  She considers herself in overall good health.  Summary:     We discussed hereditary breast and ovarian cancer with Mrs. Grayson Sarkar because of her family history.  Most breast/ovarian cancer is not hereditary; however, hereditary cancer is suspected under certain conditions, such as when breast cancer occurs prior to the age of 50 (or premenopausal), when there are multiple affected family members, when breast cancer occurs in combination with other cancers, especially ovarian cancer, and when cancer appears to be passing from generation to generation.      We discussed dominant inheritance, the pattern in which most hereditary cancer conditions are inherited.  If an individual has such a cancer predisposing gene  mutation, there is a 50% chance that any offspring will not inherit the mutation and a 50% chance that he or she will inherit it.  Inheriting the mutation does not automatically mean that one will develop cancer, rather that there is an increased chance for developing certain types of cancer.  Cancer predisposing gene mutations can exist in males and females and can be passed on to both male and female offspring.  The pros and cons of cancer predisposing gene mutation testing were reviewed with Mrs. Grayson Sarkar.  Genetic test results can have significant impact on medical management, planning, screening, surgical decision-making, cancer risk assessment for the patient and relatives, and psychological/emotional well-being.  Mutations in the genes BRCA1 and BRCA2 account for most (but not all) cases of hereditary breast/ovarian cancer.  Mutations in other genes have also been associated with an increased risk for ovarian cancer - but mutations in these other genes are statistically less often seen in hereditary breast or ovarian cancer.    We discussed the benefits and limitations of BRCA1/2 testing versus multi-gene panels that include BRCA1/2.  Panels are an appropriate option for individuals whose history is suggestive of more than one syndrome, and they improve detection rate for identifying the underlying cause of a hereditary cancer.  Limitations of panels include an unknown percentage of variants of unknown significance, as well as an uncertainty of level of risk associated with certain unknown-penetrance genes, and therefore lack of clear guidelines for risk management of carriers of some of these mutations.  There are panels that assess for mutations in only “high risk” and clinically actionable cancer genes as well as larger panels that assess for mutations in high, moderate and unknown-penetrance cancer genes.  Genetic testing could yield one of three results: no mutation detected, deleterious mutation  detected, or variant of uncertain significance.  The implications of these potential results were reviewed with Mrs. Grayson Sarkar.  The optimal testing strategy is to test an affected family member first.  We discussed the limitations of interpreting test results of an unaffected individual.  Specifically, a negative result in an unaffected individual is uninformative unless a known mutation has been identified in an affected relative.  Unfortunately the majority of her maternal family members have passed away so this is not an option.  Another option is testing Mrs. Grayson Sarkar rather than an affected relative, and we reviewed the limitations of this testing, including concerns about discrimination by life insurers, long term healthcare or disability, which are not covered by statutes yet.    Given her family history of ovarian cancer, metastatic prostate cancer and breast cancer on BOTH sides of her family, she does meet NCCN criteria for genetic testing. Both sides of her family meets HBOC criteria.  She does also meet criteria for polyposis syndromes given her personal history of >10 colon polyps.  After discussing the multiple testing options, Mrs. Grayson Sarkar decided that she would like to pursue molecular genetic testing for genes which can give her the quickest results that may impact her cervical cancer treatment.  Testing was ordered for [facility] Hereditary Breast Cancer STAT Panel with LILY and CHEK2 (9 genes) through [facility].  Blood was drawn and sent to [facility] today.   Results will be reported to Mrs. Grayson Sarkar and her referring MD as soon as available, typically within1- 2 weeks timeframe. If she elects to reflex to other genes, she is aware this is available at no cost.  We did review that given the significant paternal family history of three relatives with ovarian cancer, she will still have an increased risk of ovarian cancer even with negative genetic testing  results and she may seriously consider removal of her ovaries to minimize her risk.  Plan:  Blood was drawn and sent to [facility] for their [facility] Hereditary Breast Cancer STAT Panel with LILY and CHEK2 (9 genes).    The Genetics office will call Mrs. Grayson Sarkar when results are received.  Post-test counseling can be scheduled at that time.  Recommendations for Mrs. Grayson Sarkar and family members will depend on above test results.    Thank you for referring Mrs. Grayson Sarkar for genetic counseling; please do not hesitate to contact our office if you have any questions or concerns, [phone].  Send to: Alka Gilbert MD  Time spent managing patient care: 60 minutes

## 2025-04-28 ENCOUNTER — MED REC SCAN ONLY (OUTPATIENT)
Dept: PHYSICAL MEDICINE AND REHAB | Facility: CLINIC | Age: 60
End: 2025-04-28

## 2025-05-02 ENCOUNTER — TELEPHONE (OUTPATIENT)
Age: 60
End: 2025-05-02

## 2025-05-02 NOTE — TELEPHONE ENCOUNTER
Shared genetic testing results with Angi over phone. 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 Released these results to her through lab's portal and also will mail her a copy. Explained AR nature of MUTYH and what mosaic means and that this will not change her medical management. She was relieved to hear this. All her questions were answered to the best of my ability and she was appreciative of the call.

## 2025-05-07 ENCOUNTER — LAB REQUISITION (OUTPATIENT)
Dept: LAB | Age: 60
End: 2025-05-07

## 2025-05-07 DIAGNOSIS — N95.0 POSTMENOPAUSAL BLEEDING: ICD-10-CM

## 2025-05-07 DIAGNOSIS — R10.2 PELVIC AND PERINEAL PAIN: ICD-10-CM

## 2025-05-07 PROCEDURE — 88305 TISSUE EXAM BY PATHOLOGIST: CPT | Performed by: CLINICAL MEDICAL LABORATORY

## 2025-05-08 ENCOUNTER — TELEPHONE (OUTPATIENT)
Dept: FAMILY MEDICINE CLINIC | Facility: CLINIC | Age: 60
End: 2025-05-08

## 2025-05-08 NOTE — TELEPHONE ENCOUNTER
[facility] No available appointments. Please advise are you able to double book patient on Friday 05/09. Patient reports all required testings need to be submitted by Monday 05/12. Please advise.

## 2025-05-09 ENCOUNTER — LAB ENCOUNTER (OUTPATIENT)
Dept: LAB | Age: 60
End: 2025-05-09
Attending: FAMILY MEDICINE
Payer: COMMERCIAL

## 2025-05-09 ENCOUNTER — HOSPITAL ENCOUNTER (OUTPATIENT)
Dept: GENERAL RADIOLOGY | Age: 60
Discharge: HOME | End: 2025-05-09
Attending: FAMILY MEDICINE
Payer: COMMERCIAL

## 2025-05-09 ENCOUNTER — OFFICE VISIT (OUTPATIENT)
Dept: FAMILY MEDICINE CLINIC | Facility: CLINIC | Age: 60
End: 2025-05-09

## 2025-05-09 VITALS
RESPIRATION RATE: 16 BRPM | DIASTOLIC BLOOD PRESSURE: 69 MMHG | HEART RATE: 84 BPM | BODY MASS INDEX: 34 KG/M2 | SYSTOLIC BLOOD PRESSURE: 106 MMHG | WEIGHT: 202 LBS | TEMPERATURE: 97 F

## 2025-05-09 DIAGNOSIS — Z80.41 FAMILY HISTORY OF OVARIAN CANCER: ICD-10-CM

## 2025-05-09 DIAGNOSIS — Z90.711 HISTORY OF HYSTERECTOMY, SUPRACERVICAL: ICD-10-CM

## 2025-05-09 DIAGNOSIS — M54.59 MECHANICAL LOW BACK PAIN: ICD-10-CM

## 2025-05-09 DIAGNOSIS — Z80.42 FAMILY HISTORY OF PROSTATE CANCER: ICD-10-CM

## 2025-05-09 DIAGNOSIS — Z01.818 PRE-OP EXAMINATION: Primary | ICD-10-CM

## 2025-05-09 DIAGNOSIS — E66.9 OBESITY (BMI 30-39.9): ICD-10-CM

## 2025-05-09 DIAGNOSIS — M54.16 LUMBAR RADICULOPATHY: ICD-10-CM

## 2025-05-09 DIAGNOSIS — M70.61 GREATER TROCHANTERIC BURSITIS OF BOTH HIPS: ICD-10-CM

## 2025-05-09 DIAGNOSIS — Z01.812 ENCOUNTER FOR PRE-OPERATIVE LABORATORY TESTING: ICD-10-CM

## 2025-05-09 DIAGNOSIS — M70.62 GREATER TROCHANTERIC BURSITIS OF BOTH HIPS: ICD-10-CM

## 2025-05-09 DIAGNOSIS — M51.369 BULGE OF LUMBAR DISC WITHOUT MYELOPATHY: ICD-10-CM

## 2025-05-09 DIAGNOSIS — Z01.818 PRE-OP TESTING: Primary | ICD-10-CM

## 2025-05-09 DIAGNOSIS — E78.5 HYPERLIPIDEMIA, UNSPECIFIED HYPERLIPIDEMIA TYPE: ICD-10-CM

## 2025-05-09 DIAGNOSIS — M76.01 GLUTEAL TENDINITIS OF RIGHT BUTTOCK: ICD-10-CM

## 2025-05-09 DIAGNOSIS — N95.0 PMB (POSTMENOPAUSAL BLEEDING): ICD-10-CM

## 2025-05-09 DIAGNOSIS — M47.816 LUMBAR SPONDYLOSIS: ICD-10-CM

## 2025-05-09 DIAGNOSIS — R10.2 PELVIC PAIN IN FEMALE: ICD-10-CM

## 2025-05-09 DIAGNOSIS — S76.019A STRAIN OF GLUTEUS MEDIUS, UNSPECIFIED LATERALITY, INITIAL ENCOUNTER: ICD-10-CM

## 2025-05-09 DIAGNOSIS — M99.9 BIOMECHANICAL LESION: ICD-10-CM

## 2025-05-09 DIAGNOSIS — G43.001 MIGRAINE WITHOUT AURA AND WITH STATUS MIGRAINOSUS, NOT INTRACTABLE: ICD-10-CM

## 2025-05-09 DIAGNOSIS — Z80.3 FAMILY HISTORY OF MALIGNANT NEOPLASM OF BREAST: ICD-10-CM

## 2025-05-09 DIAGNOSIS — M51.372 DEGENERATION OF INTERVERTEBRAL DISC OF LUMBOSACRAL REGION WITH DISCOGENIC BACK PAIN AND LOWER EXTREMITY PAIN: ICD-10-CM

## 2025-05-09 DIAGNOSIS — M47.816 FACET SYNDROME, LUMBAR: ICD-10-CM

## 2025-05-09 DIAGNOSIS — I10 ESSENTIAL HYPERTENSION: ICD-10-CM

## 2025-05-09 DIAGNOSIS — M79.10 MYALGIA: ICD-10-CM

## 2025-05-09 DIAGNOSIS — M48.061 LUMBAR FORAMINAL STENOSIS: ICD-10-CM

## 2025-05-09 DIAGNOSIS — L20.9 ATOPIC DERMATITIS, UNSPECIFIED TYPE: ICD-10-CM

## 2025-05-09 LAB
ALBUMIN SERPL-MCNC: 4.7 G/DL (ref 3.2–4.8)
ALBUMIN/GLOB SERPL: 2.1 {RATIO} (ref 1–2)
ALP LIVER SERPL-CCNC: 73 U/L (ref 46–118)
ALT SERPL-CCNC: 25 U/L (ref 10–49)
ANION GAP SERPL CALC-SCNC: 7 MMOL/L (ref 0–18)
ASR DISCLAIMER: NORMAL
AST SERPL-CCNC: 22 U/L (ref ?–34)
ATRIAL RATE: 77 BPM
BASOPHILS # BLD AUTO: 0.02 X10(3) UL (ref 0–0.2)
BASOPHILS NFR BLD AUTO: 0.3 %
BILIRUB SERPL-MCNC: 0.7 MG/DL (ref 0.3–1.2)
BILIRUB UR QL: NEGATIVE
BUN BLD-MCNC: 15 MG/DL (ref 9–23)
BUN/CREAT SERPL: 14.7 (ref 10–20)
CALCIUM BLD-MCNC: 9.4 MG/DL (ref 8.7–10.4)
CASE RPRT: NORMAL
CHLORIDE SERPL-SCNC: 106 MMOL/L (ref 98–112)
CLARITY UR: CLEAR
CLINICAL INFO: NORMAL
CO2 SERPL-SCNC: 29 MMOL/L (ref 21–32)
COLOR UR: YELLOW
CREAT BLD-MCNC: 1.02 MG/DL (ref 0.55–1.02)
DEPRECATED RDW RBC AUTO: 41.1 FL (ref 35.1–46.3)
EGFRCR SERPLBLD CKD-EPI 2021: 63 ML/MIN/1.73M2 (ref 60–?)
EOSINOPHIL # BLD AUTO: 0.15 X10(3) UL (ref 0–0.7)
EOSINOPHIL NFR BLD AUTO: 2 %
ERYTHROCYTE [DISTWIDTH] IN BLOOD BY AUTOMATED COUNT: 13.2 % (ref 11–15)
FASTING STATUS PATIENT QL REPORTED: YES
GLOBULIN PLAS-MCNC: 2.2 G/DL (ref 2–3.5)
GLUCOSE BLD-MCNC: 91 MG/DL (ref 70–99)
GLUCOSE UR-MCNC: NORMAL MG/DL
HCT VFR BLD AUTO: 42.7 % (ref 35–48)
HGB BLD-MCNC: 13.1 G/DL (ref 12–16)
HGB UR QL STRIP.AUTO: NEGATIVE
IMM GRANULOCYTES # BLD AUTO: 0.02 X10(3) UL (ref 0–1)
IMM GRANULOCYTES NFR BLD: 0.3 %
INR BLD: 0.89 (ref 0.8–1.2)
KETONES UR-MCNC: NEGATIVE MG/DL
LEUKOCYTE ESTERASE UR QL STRIP.AUTO: NEGATIVE
LYMPHOCYTES # BLD AUTO: 2.79 X10(3) UL (ref 1–4)
LYMPHOCYTES NFR BLD AUTO: 36.7 %
MCH RBC QN AUTO: 26.3 PG (ref 26–34)
MCHC RBC AUTO-ENTMCNC: 30.7 G/DL (ref 31–37)
MCV RBC AUTO: 85.7 FL (ref 80–100)
MONOCYTES # BLD AUTO: 0.46 X10(3) UL (ref 0.1–1)
MONOCYTES NFR BLD AUTO: 6.1 %
NEUTROPHILS # BLD AUTO: 4.16 X10 (3) UL (ref 1.5–7.7)
NEUTROPHILS # BLD AUTO: 4.16 X10(3) UL (ref 1.5–7.7)
NEUTROPHILS NFR BLD AUTO: 54.6 %
NITRITE UR QL STRIP.AUTO: NEGATIVE
OSMOLALITY SERPL CALC.SUM OF ELEC: 294 MOSM/KG (ref 275–295)
P AXIS: 60 DEGREES
P-R INTERVAL: 182 MS
PATH REPORT.FINAL DX SPEC: NORMAL
PATH REPORT.GROSS SPEC: NORMAL
PH UR: 6 [PH] (ref 5–8)
PLATELET # BLD AUTO: 286 10(3)UL (ref 150–450)
POTASSIUM SERPL-SCNC: 3.6 MMOL/L (ref 3.5–5.1)
PROT SERPL-MCNC: 6.9 G/DL (ref 5.7–8.2)
PROT UR-MCNC: 20 MG/DL
PROTHROMBIN TIME: 12.6 SECONDS (ref 11.6–14.8)
Q-T INTERVAL: 412 MS
QRS DURATION: 84 MS
QTC CALCULATION (BEZET): 466 MS
R AXIS: -8 DEGREES
RBC # BLD AUTO: 4.98 X10(6)UL (ref 3.8–5.3)
SODIUM SERPL-SCNC: 142 MMOL/L (ref 136–145)
SP GR UR STRIP: 1.02 (ref 1–1.03)
T AXIS: 6 DEGREES
UROBILINOGEN UR STRIP-ACNC: NORMAL
VENTRICULAR RATE: 77 BPM
WBC # BLD AUTO: 7.6 X10(3) UL (ref 4–11)

## 2025-05-09 PROCEDURE — 81001 URINALYSIS AUTO W/SCOPE: CPT

## 2025-05-09 PROCEDURE — 3074F SYST BP LT 130 MM HG: CPT | Performed by: FAMILY MEDICINE

## 2025-05-09 PROCEDURE — 80053 COMPREHEN METABOLIC PANEL: CPT

## 2025-05-09 PROCEDURE — 85025 COMPLETE CBC W/AUTO DIFF WBC: CPT

## 2025-05-09 PROCEDURE — 99214 OFFICE O/P EST MOD 30 MIN: CPT | Performed by: FAMILY MEDICINE

## 2025-05-09 PROCEDURE — 85610 PROTHROMBIN TIME: CPT

## 2025-05-09 PROCEDURE — 36415 COLL VENOUS BLD VENIPUNCTURE: CPT

## 2025-05-09 PROCEDURE — 3078F DIAST BP <80 MM HG: CPT | Performed by: FAMILY MEDICINE

## 2025-05-09 PROCEDURE — 71046 X-RAY EXAM CHEST 2 VIEWS: CPT | Performed by: FAMILY MEDICINE

## 2025-05-09 PROCEDURE — 93000 ELECTROCARDIOGRAM COMPLETE: CPT | Performed by: FAMILY MEDICINE

## 2025-05-09 RX ORDER — TRAMADOL HYDROCHLORIDE 50 MG/1
50 TABLET ORAL EVERY 6 HOURS PRN
Qty: 30 TABLET | Refills: 0 | Status: SHIPPED | OUTPATIENT
Start: 2025-05-09

## 2025-05-09 RX ORDER — TRIAMCINOLONE ACETONIDE 1 MG/G
CREAM TOPICAL
Qty: 90 G | Refills: 3 | Status: SHIPPED | OUTPATIENT
Start: 2025-05-09

## 2025-05-09 RX ORDER — METRONIDAZOLE 500 MG/1
500 TABLET ORAL 3 TIMES DAILY
COMMUNITY
End: 2025-05-10

## 2025-05-09 NOTE — PATIENT INSTRUCTIONS
Preoperative labs have been ordered including EKG and chest x-ray.  Medication reviewed and renewed where needed and appropriate.  Pain management via prescription medications as needed.  Monitor blood pressures and record at home. Limit salt intake.  Comply with medications.

## 2025-05-09 NOTE — TELEPHONE ENCOUNTER
Spoke to Dr Dey directly and he approved an appt for 12:20 pm today.     \"You can tell the patient that she needs to be here 12:20 PM and she has to be on time and this visit is restricted only to her preoperative clearance.\" Per PCP    Called patient (name and  verified) and instructed on providers message below. Patient verbalized understanding and agrees to plan.    Pre-op appt at 12:20 per PCP Patient informed of time slot and to be on time for appt

## 2025-05-09 NOTE — TELEPHONE ENCOUNTER
Dr Dey, please advise    Patient (name and  verified) is calling again for an appt for pre-op clearance. Can you add on to your schedule today or Monday?     Surgery is scheduled for 05/15/25

## 2025-05-10 NOTE — PROGRESS NOTES
Subjective:     Patient ID: Angi Sarkar is a 59 year old female.    This patient is a 59-year-old well-established, hypertensive/obese by definition -American female who presents for medical clearance and preoperative testing for upcoming abdominal/pelvic surgery and lieu of the persistent and worsening abdominal/pelvic pain.  Patient has a history for hysterectomy.  Her ovaries and cervix remain.  There has been a report of intermittent vaginal bleeding.  The purpose of the surgery is to remove the ovaries as well as the remaining portion of the patient's cervix and possible modifications of the posterior vaginal vault if any suspect lesions of findings are present.    Patient currently does not report or display any signs and symptoms consistent with upper respiratory infection or systemic viral illness.    Patient declines on Tdap, pneumococcal, and shingles vaccine.        History/Other:   Review of Systems  Current Medications[1]  Allergies:Allergies[2]    Past Medical History[3]   Past Surgical History[4]   Family History[5]   Social History: Short Social Hx on File[6]     Objective:   Vitals:    05/09/25 1240   BP: 106/69   Pulse: 84   Resp: 16   Temp: 97.3 °F (36.3 °C)       Physical Exam  Constitutional:       Appearance: Normal appearance.   HENT:      Right Ear: Tympanic membrane normal.      Left Ear: Tympanic membrane normal.      Nose: Nose normal.   Neck:      Thyroid: No thyromegaly.   Cardiovascular:      Rate and Rhythm: Normal rate and regular rhythm.      Heart sounds:      No gallop.   Pulmonary:      Effort: Pulmonary effort is normal.      Breath sounds: Normal breath sounds.   Neurological:      General: No focal deficit present.      Mental Status: She is alert and oriented to person, place, and time.   Psychiatric:         Mood and Affect: Affect is tearful.      Comments: Quietly anxious.  Tearful.         Assessment & Plan:   1. Pre-op examination  Medical clearance  pending resulted labs and EKG and chest x-ray.    2. Encounter for pre-operative laboratory testing  The following has been ordered.  - Basic Metabolic Panel (8) [E]; Future  - CBC With Differential With Platelet; Future  - Urinalysis with Culture Reflex; Future  - Prothrombin Time (PT) [E]; Future  - EKG In-Office [04686]: Normal sinus rate and rhythm.  No Q waves.  No ST-T wave changes.  Possible left atrial enlargement.  - XR CHEST PA + LAT CHEST (CPT=71046); Future    3. Essential hypertension  Blood pressure measures to goal.  Well-controlled.  Compliance with medication emphasized and encouraged.    4. Atopic dermatitis, unspecified type  The following has been refilled.  - triamcinolone 0.1 % External Cream; Apply topically 2 (two) times daily. - Topical  Dispense: 90 g; Refill: 3    5. Migraine without aura and with status migrainosus, not intractable  Pain management.  - traMADol 50 MG Oral Tab; Take 1 tablet (50 mg total) by mouth every 6 (six) hours as needed for Pain.  Dispense: 30 tablet; Refill: 0    6. Biomechanical lesion  Pain management.  - traMADol 50 MG Oral Tab; Take 1 tablet (50 mg total) by mouth every 6 (six) hours as needed for Pain.  Dispense: 30 tablet; Refill: 0    7. Greater trochanteric bursitis of both hips  Pain management.  - traMADol 50 MG Oral Tab; Take 1 tablet (50 mg total) by mouth every 6 (six) hours as needed for Pain.  Dispense: 30 tablet; Refill: 0    8. Gluteal tendinitis of right buttock  Pain management.  - traMADol 50 MG Oral Tab; Take 1 tablet (50 mg total) by mouth every 6 (six) hours as needed for Pain.  Dispense: 30 tablet; Refill: 0    9. Strain of gluteus medius, unspecified laterality, initial encounter  Pain management.  - traMADol 50 MG Oral Tab; Take 1 tablet (50 mg total) by mouth every 6 (six) hours as needed for Pain.  Dispense: 30 tablet; Refill: 0    10. Myalgia  Pain management.  - traMADol 50 MG Oral Tab; Take 1 tablet (50 mg total) by mouth every 6  (six) hours as needed for Pain.  Dispense: 30 tablet; Refill: 0    11. Facet syndrome, lumbar  Pain management.  - traMADol 50 MG Oral Tab; Take 1 tablet (50 mg total) by mouth every 6 (six) hours as needed for Pain.  Dispense: 30 tablet; Refill: 0    12. Mechanical low back pain  Pain management.  - traMADol 50 MG Oral Tab; Take 1 tablet (50 mg total) by mouth every 6 (six) hours as needed for Pain.  Dispense: 30 tablet; Refill: 0    13. Lumbar spondylosis  Pain management.  - traMADol 50 MG Oral Tab; Take 1 tablet (50 mg total) by mouth every 6 (six) hours as needed for Pain.  Dispense: 30 tablet; Refill: 0    14. Degeneration of intervertebral disc of lumbosacral region with discogenic back pain and lower extremity pain  Pain management.  - traMADol 50 MG Oral Tab; Take 1 tablet (50 mg total) by mouth every 6 (six) hours as needed for Pain.  Dispense: 30 tablet; Refill: 0    15. Lumbar foraminal stenosis  Pain management.  - traMADol 50 MG Oral Tab; Take 1 tablet (50 mg total) by mouth every 6 (six) hours as needed for Pain.  Dispense: 30 tablet; Refill: 0    16. Bulge of lumbar disc without myelopathy  Pain management.  - traMADol 50 MG Oral Tab; Take 1 tablet (50 mg total) by mouth every 6 (six) hours as needed for Pain.  Dispense: 30 tablet; Refill: 0    17. Lumbar radiculopathy  Pain management.  - traMADol 50 MG Oral Tab; Take 1 tablet (50 mg total) by mouth every 6 (six) hours as needed for Pain.  Dispense: 30 tablet; Refill: 0    18. Hyperlipidemia, unspecified hyperlipidemia type  Status to be updated at the patient's annual physical.        Orders Placed This Encounter   Procedures    Basic Metabolic Panel (8) [E]    CBC With Differential With Platelet    Urinalysis with Culture Reflex    Prothrombin Time (PT) [E]       Meds This Visit:  Requested Prescriptions     Signed Prescriptions Disp Refills    triamcinolone 0.1 % External Cream 90 g 3     Sig: Apply topically 2 (two) times daily. - Topical     traMADol 50 MG Oral Tab 30 tablet 0     Sig: Take 1 tablet (50 mg total) by mouth every 6 (six) hours as needed for Pain.       Imaging & Referrals:  ELECTROCARDIOGRAM, COMPLETE     Patient Instructions   Preoperative labs have been ordered including EKG and chest x-ray.  Medication reviewed and renewed where needed and appropriate.  Pain management via prescription medications as needed.  Monitor blood pressures and record at home. Limit salt intake.  Comply with medications.    Return if symptoms worsen or fail to improve.         [1]   Current Outpatient Medications   Medication Sig Dispense Refill    triamcinolone 0.1 % External Cream Apply topically 2 (two) times daily. - Topical 90 g 3    traMADol 50 MG Oral Tab Take 1 tablet (50 mg total) by mouth every 6 (six) hours as needed for Pain. 30 tablet 0    metroNIDAZOLE 500 MG Oral Tab Take 1 tablet (500 mg total) by mouth 3 (three) times daily.      naproxen (NAPROSYN) 500 MG Oral Tab Take 1 tablet (500 mg total) by mouth 2 (two) times daily with meals. Take for 2 weeks as directed and then as needed. 60 tablet 0    Zolpidem Tartrate ER 12.5 MG Oral Tab CR Take 1 tablet (12.5 mg total) by mouth nightly as needed for Sleep. 30 tablet 5    atorvastatin 40 MG Oral Tab Take 1 tablet (40 mg total) by mouth nightly. 90 tablet 3    LANSOPRAZOLE 30 MG Oral Capsule Delayed Release TAKE 1 CAPSULE BY MOUTH EVERY DAY BEFORE BREAKFAST 90 capsule 0    amLODIPine 10 MG Oral Tab Take 1 tablet (10 mg total) by mouth daily. 90 tablet 3    hydroCHLOROthiazide 12.5 MG Oral Tab Take 1 tablet (12.5 mg total) by mouth daily. 90 tablet 3    cetirizine 10 MG Oral Tab Take 1 tablet (10 mg total) by mouth daily. 90 tablet 3    dicyclomine 10 MG Oral Cap Take 1 capsule (10 mg total) by mouth 3 (three) times daily as needed. 30 capsule 1    Multiple Vitamins-Minerals (CENTRUM) Oral Liquid Take  by mouth.      aspirin 81 MG Oral Chew Tab Chew 1 tablet (81 mg total) by mouth daily.       ciprofloxacin 500 MG Oral Tab Take 1 tablet (500 mg total) by mouth 2 (two) times daily. Take every 12 hours (Patient not taking: Reported on 5/9/2025) 20 tablet 0   [2]   Allergies  Allergen Reactions    Morphine HIVES    Toradol [Ketorolac Tromethamine] OTHER (SEE COMMENTS)     Upset stomach per pt   [3]   Past Medical History:   Allergy    seasonal allergies    BRCA neg    Negative for 70 gene panel aside from poss mosaic VUS in [facility]. Results in media tab    Calculus of kidney    Colon polyp    Diverticular disease    Can’t remember… Found out 3 years ago    Esophageal reflux    Fibroid, uterine    fibroids [women's health].  2001 total abdominal hysterectomy    Gastritis    High blood pressure    High cholesterol    History of stomach ulcers    Hx of motion sickness    Hyperlipidemia    IBS (irritable bowel syndrome)    Can’t remember    Left leg injury    severe lt leg injury/laceration.  surgical repair    Migraine headache    migraine headaches    Migraines    PONV (postoperative nausea and vomiting)    Right nephrolithiasis    \"right right kidney stones\"  June 2013    Visual impairment    Reading glasses   [4]   Past Surgical History:  Procedure Laterality Date    Cholecystectomy      Colonoscopy N/A 08/28/2020    Procedure: COLONOSCOPY;  Surgeon: Eren Dickinson MD;  Location: [facility] ENDOSCOPY    Colonoscopy  01/07/2025    polyp, diverticulosis, hemorrhoids    Colonoscopy N/A 01/07/2025    Procedure: COLONOSCOPY;  Surgeon: Eren Dickinson MD;  Location[facility] ENDO    Foot surgery Right 2014    [facility] supracervical abdominal hysterectomy  2001    fibroids, pt states partial bowel resection    Hemorrhoidectomy  06/16/2015    PPH hemorrhoidectomy with rectal mucosal proctopexy    Leg/ankle surgery proc unlisted Left 1970    surgical repair, severe lt leg injury/laceration    Myomectomy 5/> intramural myomas &/or total wt >250 gms, abdominal approach  1999    Removal of ovarian cyst(s)  1998 1997,      Upper gi endoscopy,biopsy  2020   [5]   Family History  Problem Relation Age of Onset    Prostate Cancer Father 68        metastatic    Cancer Father 68        prostate, metastatic    Hypertension Father     Cancer Mother 50        breast    Hypertension Mother     Breast Cancer Mother 50    Breast Cancer Daughter 43    Cancer Daughter 43        breast    Cancer Daughter 22        lymphoma    Prostate Cancer Maternal Grandfather 60        metastatic    Cancer Maternal Grandfather         metastatic prostate ca    Colon Polyps Maternal Grandfather     Ovarian Cancer Paternal Grandmother     Breast Cancer Paternal Grandmother 42        also ovarian    Cancer Paternal Grandmother 42        breast and ovarian ca    Cancer Paternal Grandfather 49        prostate; metastatic    Prostate Cancer Paternal Grandfather 49         of metastatic dz    Cancer Sister 44        breast    Breast Cancer Sister 44    Cancer Maternal Aunt         breast and ovarian    Ovarian Cancer Maternal Aunt     Breast Cancer Maternal Aunt     Cancer Maternal Uncle         unknown type    Ovarian Cancer Paternal Aunt 68        dtr also had    Cancer Paternal Aunt 68        ovarian    Ovarian Cancer Paternal Cousin Female     Cancer Paternal Cousin Female         ovarian   [6]   Social History  Socioeconomic History    Marital status:     Number of children: 3   Occupational History    Occupation: Collections   Tobacco Use    Smoking status: Never    Smokeless tobacco: Never    Tobacco comments:     Don't Smoke   Vaping Use    Vaping status: Never Used   Substance and Sexual Activity    Alcohol use: No    Drug use: No    Sexual activity: Yes     Birth control/protection: Hysterectomy     Comment: supracervical  hysterectomy   Other Topics Concern    Caffeine Concern No     Social Drivers of Health     Food Insecurity: Patient Declined (2025)    Received from [facility]    Hunger Vital Sign      Worried About Running Out of Food in the Last Year: Patient declined     Ran Out of Food in the Last Year: Patient declined   Transportation Needs: No Transportation Needs (2/28/2025)    Received from [facility]    PRAPARE - Transportation     Lack of Transportation (Medical): No     Lack of Transportation (Non-Medical): No   Housing Stability: Unknown (2/28/2025)    Received from [facility]    Housing Stability Vital Sign     Unable to Pay for Housing in the Last Year: No

## 2025-05-12 ENCOUNTER — TELEPHONE (OUTPATIENT)
Dept: FAMILY MEDICINE CLINIC | Facility: CLINIC | Age: 60
End: 2025-05-12

## 2025-05-12 ENCOUNTER — NURSE TRIAGE (OUTPATIENT)
Dept: FAMILY MEDICINE CLINIC | Facility: CLINIC | Age: 60
End: 2025-05-12

## 2025-05-12 RX ORDER — METRONIDAZOLE 500 MG/1
500 TABLET ORAL 3 TIMES DAILY
Qty: 30 TABLET | Refills: 0 | Status: SHIPPED | OUTPATIENT
Start: 2025-05-12

## 2025-05-12 RX ORDER — METRONIDAZOLE 500 MG/1
500 TABLET ORAL 3 TIMES DAILY
Qty: 21 TABLET | Refills: 0 | Status: SHIPPED | OUTPATIENT
Start: 2025-05-12 | End: 2025-05-12

## 2025-05-12 NOTE — TELEPHONE ENCOUNTER
Please review; protocol failed/No Protocol      Last Office Visit: 05/09/2025    Please advise on patients comment:   Patient Comment: Dr. Gera Dey Was Supposed To Have Refilled it Yesterday During My office Visit…. Can You Please Refill It..

## 2025-05-12 NOTE — TELEPHONE ENCOUNTER
Per patient , she picked up all the medications except for the flagyl.   She is requesting to send the antibiotic  to [facility] in [place], [place] .     She is requesting an update to be sent through [employer] .   Per chart review, metronidazole (flagyl ) was sent today to the [facility] .   Update sent via [employer] pre patient's request .             MEDICATION RECORD :     Disp Refills Start End    metroNIDAZOLE 500 MG Oral Tab 21 tablet 0 5/12/2025 5/19/2025    Sig - Route: Take 1 tablet (500 mg total) by mouth 3 (three) times daily for 7 days. - Oral    Sent to pharmacy as: metroNIDAZOLE 500 MG Oral Tablet (Flagyl)    E-Prescribing Status: Receipt confirmed by pharmacy (5/12/2025 11:58 AM CDT)      Pharmacy    [id number] - [place], [place] - [address], [phone], [phone]

## 2025-05-13 ENCOUNTER — MED REC SCAN ONLY (OUTPATIENT)
Dept: FAMILY MEDICINE CLINIC | Facility: CLINIC | Age: 60
End: 2025-05-13

## 2025-05-14 ENCOUNTER — TELEPHONE (OUTPATIENT)
Dept: FAMILY MEDICINE CLINIC | Facility: CLINIC | Age: 60
End: 2025-05-14

## 2025-05-14 NOTE — TELEPHONE ENCOUNTER
All labs and EKG and chest x-ray have been reviewed.  Patient is formally medically cleared to proceed with abdominal/pelvic surgery scheduled for Thursday, May 15, 2025.  The medical clearance encounter/visit has been addended and states the same.  Please contact the caller and let them know that she is medically cleared and sent any and all documents necessary and pertinent to the  surgical clearance.

## 2025-05-14 NOTE — TELEPHONE ENCOUNTER
Patient states Dr Hill office has notified her they have not received pre op clearance and patient is scheduled for surgery tomorrow.    Please advise.      Staff:  pre op clearance can be faxed to Dr Hill at [phone] [facility].

## 2025-05-14 NOTE — TELEPHONE ENCOUNTER
Labs, EKG, X-ray, and office note with clearance faxed to [phone]     Called and left voicemail with Conchis ([phone]) to advise of clearance and faxed documents

## 2025-05-15 NOTE — TELEPHONE ENCOUNTER
Called patient to obtain details. Details are below.     Type of Leave: Continuous  Reason for Leave: Procedure 5/15/25  Start date of leave: 5/15/25 to 6-8 weeks  End date of leave:  How many flare ups per month/length?:  How many appts per month/length?:   Was Fee and Turnaround info Given?:

## 2025-05-15 NOTE — TELEPHONE ENCOUNTER
Family Medical Leave Act forms received via [employer]. [employer] message sent for Release of Information. Logged for processing.

## 2025-05-19 NOTE — TELEPHONE ENCOUNTER
Dr. Dey,    Patient is requesting disability from 5/15/25 to 6-8 weeks due to Abdominal pelvic surgery procedure 5/15/25. Do you support?    Thanks so much for your time,  Saige Villagran dept

## 2025-05-19 NOTE — TELEPHONE ENCOUNTER
Dr. Dey,    Please sign off on form if you agree to: disability due to abdominal/pelvic procedure 5/15/25 to 6-8 weeks.    -Signature page will be the first page scanned  -From your Inbasket, Highlight the patient and click Chart   -Double click the 5/12/25 Forms Completion telephone encounter  -Scroll down to the Media section   -Click the blue Hyperlink: Roxanne, Gera Dey, 5/19/25  -Click Acknowledge located in the top right ribbon/menu   -Drag the mouse into the blank space of the document and a + sign will appear. Left click to   electronically sign the document.  -Once signed, simply exit out of the screen and you signature will be saved.     Thank you,  Saige GALLO

## 2025-05-20 NOTE — TELEPHONE ENCOUNTER
Forms completed and uploaded to patients [employer] due to no Release of Information on file.

## 2025-06-11 DIAGNOSIS — M70.62 GREATER TROCHANTERIC BURSITIS OF BOTH HIPS: ICD-10-CM

## 2025-06-11 DIAGNOSIS — M51.372 DEGENERATION OF INTERVERTEBRAL DISC OF LUMBOSACRAL REGION WITH DISCOGENIC BACK PAIN AND LOWER EXTREMITY PAIN: ICD-10-CM

## 2025-06-11 DIAGNOSIS — G43.001 MIGRAINE WITHOUT AURA AND WITH STATUS MIGRAINOSUS, NOT INTRACTABLE: ICD-10-CM

## 2025-06-11 DIAGNOSIS — S76.019A STRAIN OF GLUTEUS MEDIUS, UNSPECIFIED LATERALITY, INITIAL ENCOUNTER: ICD-10-CM

## 2025-06-11 DIAGNOSIS — I10 ESSENTIAL HYPERTENSION: ICD-10-CM

## 2025-06-11 DIAGNOSIS — M99.9 BIOMECHANICAL LESION: ICD-10-CM

## 2025-06-11 DIAGNOSIS — M54.16 LUMBAR RADICULOPATHY: ICD-10-CM

## 2025-06-11 DIAGNOSIS — M79.10 MYALGIA: ICD-10-CM

## 2025-06-11 DIAGNOSIS — M51.369 BULGE OF LUMBAR DISC WITHOUT MYELOPATHY: ICD-10-CM

## 2025-06-11 DIAGNOSIS — E78.5 HYPERLIPIDEMIA, UNSPECIFIED HYPERLIPIDEMIA TYPE: ICD-10-CM

## 2025-06-11 DIAGNOSIS — M54.59 MECHANICAL LOW BACK PAIN: ICD-10-CM

## 2025-06-11 DIAGNOSIS — M76.01 GLUTEAL TENDINITIS OF RIGHT BUTTOCK: ICD-10-CM

## 2025-06-11 DIAGNOSIS — M70.61 GREATER TROCHANTERIC BURSITIS OF BOTH HIPS: ICD-10-CM

## 2025-06-11 DIAGNOSIS — M47.816 LUMBAR SPONDYLOSIS: ICD-10-CM

## 2025-06-11 DIAGNOSIS — M48.061 LUMBAR FORAMINAL STENOSIS: ICD-10-CM

## 2025-06-11 DIAGNOSIS — M47.816 FACET SYNDROME, LUMBAR: ICD-10-CM

## 2025-06-12 NOTE — TELEPHONE ENCOUNTER
Please review; protocol failed/ has no protocol        Recent fills: 05/15/2025,05/09/2025,04/14/2025  Last Rx written: 05/15/2025 patient reported   Last Office Visit: 05/09/2025    Recent Visits  Date Type Provider Dept   05/09/25 Office Visit Gera Dey,  [facility]

## 2025-06-13 RX ORDER — TRAMADOL HYDROCHLORIDE 50 MG/1
50 TABLET ORAL EVERY 6 HOURS PRN
Qty: 30 TABLET | Refills: 0 | Status: SHIPPED | OUTPATIENT
Start: 2025-06-13

## 2025-06-18 RX ORDER — LANSOPRAZOLE 30 MG/1
30 CAPSULE, DELAYED RELEASE ORAL
Qty: 90 CAPSULE | Refills: 0 | Status: SHIPPED | OUTPATIENT
Start: 2025-06-18

## 2025-07-15 DIAGNOSIS — R10.32 LLQ PAIN: Primary | ICD-10-CM

## 2025-07-15 NOTE — TELEPHONE ENCOUNTER
Rx request, please review and sign off if appropriate. Thank you.    Last seen: procedure 1/7/25  Last refill: 6/11/24

## 2025-07-15 NOTE — TELEPHONE ENCOUNTER
Rx request, please review and sign off if appropriate. Thank you.    Last seen: procedure 1/7/25  Last refill: 3/15/22

## 2025-07-17 RX ORDER — DICYCLOMINE HYDROCHLORIDE 10 MG/1
10 CAPSULE ORAL 3 TIMES DAILY PRN
Qty: 30 CAPSULE | Refills: 1 | Status: SHIPPED | OUTPATIENT
Start: 2025-07-17

## 2025-07-17 RX ORDER — SUCRALFATE 1 G/1
1 TABLET ORAL
Qty: 270 TABLET | Refills: 0 | Status: SHIPPED | OUTPATIENT
Start: 2025-07-17

## 2025-07-17 NOTE — TELEPHONE ENCOUNTER
Black stools? Please call and get more information  NSAIDs? Diarrhea? Peptobismol or black licorice? Pain  Feeling weak or SOB?  If weak, SOB or concern for ulcer or bleeding please notify us  Otherwise refilled for now but should follow up with Dr. Dickinson

## 2025-07-17 NOTE — TELEPHONE ENCOUNTER
No CT scan recently  Having more diverticulitis pain. Did abx 2 months ago and a little better but still having pain. Discussed want to rule out diverticulitis.     Stat CT ordered  Also having black stools. Was on naproxen and now on steroids. Discussed if symptomatic let us know but already getting better on lansoprazole and sulcalfate. If continued black stool will call back or SOB or chest pain      MARCO A Dickinson

## 2025-07-17 NOTE — TELEPHONE ENCOUNTER
Per patient states she is having flare up, states bowels are black and medication helps with this. Please advise thank you.

## 2025-07-17 NOTE — TELEPHONE ENCOUNTER
Requested Prescriptions     Pending Prescriptions Disp Refills    sucralfate 1 g Oral Tab 270 tablet 0     Sig: Take 1 tablet (1 g total) by mouth 3 (three) times daily before meals.         LOV, procedure 1/7/2025         LR    6/11/2024    RR to office on call Dr He.    IA

## 2025-07-17 NOTE — TELEPHONE ENCOUNTER
Spoke to patient, she just spoke to Dr. He, who has ordered CT scan of abdomen and medication refills have been sent to her pharmacy.   Advised patient if symptoms worsen to go to the ER or UC. Patient understanding.

## 2025-07-17 NOTE — TELEPHONE ENCOUNTER
Per patient states she saw Dr. Dickinson for procedure this year, per patient asking for refills due to diverticulitis flare up. Please advise thank you.

## 2025-07-17 NOTE — TELEPHONE ENCOUNTER
Dr He,    Spoke to patient who reports she was having pain and black stools. She took left over Dicyclomine and Sulcrafate and her symptoms were improving until she run out of medicine. Patient reports a Hx of Diverticulitis and gastric ulcers.  Follow up appointment was scheduled for next available on 11/19/2025.

## 2025-07-17 NOTE — TELEPHONE ENCOUNTER
Requested Prescriptions     Pending Prescriptions Disp Refills    dicyclomine 10 MG Oral Cap 30 capsule 1     Sig: Take 1 capsule (10 mg total) by mouth 3 (three) times daily as needed.         LOV, procedure 1/7/2025      LR   6/8/2023    RR to office on call Dr Ying FOURNIER

## 2025-07-17 NOTE — TELEPHONE ENCOUNTER
This is not appropriate for diverticulitis flare  Separate message about black stools  If pain and concerned about diverticulitis would follow up  Please get more information

## 2025-07-24 ENCOUNTER — PATIENT MESSAGE (OUTPATIENT)
Dept: FAMILY MEDICINE CLINIC | Facility: CLINIC | Age: 60
End: 2025-07-24

## 2025-08-15 RX ORDER — ATORVASTATIN CALCIUM 40 MG/1
40 TABLET, FILM COATED ORAL NIGHTLY
Qty: 90 TABLET | Refills: 3 | Status: SHIPPED | OUTPATIENT
Start: 2025-08-15

## 2025-08-15 RX ORDER — ATORVASTATIN CALCIUM 40 MG/1
40 TABLET, FILM COATED ORAL NIGHTLY
Qty: 120 TABLET | Refills: 0 | OUTPATIENT
Start: 2025-08-15

## 2025-08-15 RX ORDER — HYDROCHLOROTHIAZIDE 12.5 MG/1
12.5 TABLET ORAL DAILY
Qty: 90 TABLET | Refills: 3 | Status: SHIPPED | OUTPATIENT
Start: 2025-08-15

## (undated) DIAGNOSIS — F51.05 INSOMNIA SECONDARY TO DEPRESSION WITH ANXIETY: ICD-10-CM

## (undated) DIAGNOSIS — G44.209 TENSION HEADACHE: ICD-10-CM

## (undated) DIAGNOSIS — F41.8 INSOMNIA SECONDARY TO DEPRESSION WITH ANXIETY: ICD-10-CM

## (undated) DIAGNOSIS — K82.4 GALLBLADDER POLYP: Primary | ICD-10-CM

## (undated) DIAGNOSIS — G43.909 MIGRAINE WITHOUT STATUS MIGRAINOSUS, NOT INTRACTABLE, UNSPECIFIED MIGRAINE TYPE: ICD-10-CM

## (undated) DIAGNOSIS — R10.10 PAIN OF UPPER ABDOMEN: ICD-10-CM

## (undated) DEVICE — Device: Brand: DEFENDO AIR/WATER/SUCTION AND BIOPSY VALVE

## (undated) DEVICE — YANKAUER,BULB TIP,W/O VENT,RIGID,STERILE: Brand: MEDLINE

## (undated) DEVICE — 60 ML SYRINGE REGULAR TIP: Brand: MONOJECT

## (undated) DEVICE — KIT CLEAN ENDOKIT 1.1OZ GOWNX2

## (undated) DEVICE — MEDI-VAC NON-CONDUCTIVE SUCTION TUBING 6MM X 1.8M (6FT.) L: Brand: CARDINAL HEALTH

## (undated) DEVICE — KIT ENDO ORCAPOD 160/180/190

## (undated) DEVICE — GIJAW SINGLE-USE BIOPSY FORCEPS WITH NEEDLE: Brand: GIJAW

## (undated) DEVICE — 35 ML SYRINGE REGULAR TIP: Brand: MONOJECT

## (undated) DEVICE — DERMABOND CLOSURE 0.7ML TOPICL

## (undated) DEVICE — CONMED SCOPE SAVER BITE BLOCK, 20X27 MM: Brand: SCOPE SAVER

## (undated) DEVICE — V2 SPECIMEN COLLECTION MANIFOLD KIT: Brand: NEPTUNE

## (undated) DEVICE — MEDI-VAC NON-CONDUCTIVE SUCTION TUBING: Brand: CARDINAL HEALTH

## (undated) DEVICE — SOL  0.9% 1000ML

## (undated) DEVICE — [HIGH FLOW INSUFFLATOR,  DO NOT USE IF PACKAGE IS DAMAGED,  KEEP DRY,  KEEP AWAY FROM SUNLIGHT,  PROTECT FROM HEAT AND RADIOACTIVE SOURCES.]: Brand: PNEUMOSURE

## (undated) DEVICE — TROCAR: Brand: KII® SLEEVE

## (undated) DEVICE — DEVICE PORT SITE CLOSURE

## (undated) DEVICE — UNDYED BRAIDED (POLYGLACTIN 910), SYNTHETIC ABSORBABLE SUTURE: Brand: COATED VICRYL

## (undated) DEVICE — SOL NACL IRRIG 0.9% 1000ML BTL

## (undated) DEVICE — ENSEAL X1 TISSUE SEALER, CURVED JAW, 37 CM SHAFT LENGTH: Brand: ENSEAL

## (undated) DEVICE — SUT VICRYL 0 UR-6 J603H

## (undated) DEVICE — Device: Brand: JELCO

## (undated) DEVICE — TROCARS: Brand: KII® BALLOON BLUNT TIP SYSTEM

## (undated) DEVICE — Device: Brand: CUSTOM PROCEDURE KIT

## (undated) DEVICE — LASSO POLYPECTOMY SNARE: Brand: LASSO

## (undated) DEVICE — TISSUE RETRIEVAL SYSTEM: Brand: INZII RETRIEVAL SYSTEM

## (undated) DEVICE — SUT VICRYL 0 J906G

## (undated) DEVICE — DISPOSABLE LAPAROSCOPIC CLIP APPLIER WITH 20 CLIPS.: Brand: EPIX® UNIVERSAL CLIP APPLIER

## (undated) DEVICE — SNARE CAPTIFLEX MICRO-OVL OLY

## (undated) DEVICE — SNARE OPTMZ PLPCTM TRP

## (undated) DEVICE — MEGADYNE E-Z CLEAN BLADE 2.75"

## (undated) DEVICE — ABDOMINAL BINDER: Brand: DEROYAL

## (undated) DEVICE — STERILE WATER 1000ML BTL

## (undated) DEVICE — GAMMEX® PI HYBRID SIZE 7.5, STERILE POWDER-FREE SURGICAL GLOVE, POLYISOPRENE AND NEOPRENE BLEND: Brand: GAMMEX

## (undated) DEVICE — Device

## (undated) DEVICE — TROCAR: Brand: KII FIOS FIRST ENTRY

## (undated) DEVICE — LAP CHOLE: Brand: MEDLINE INDUSTRIES, INC.

## (undated) DEVICE — Device: Brand: DUAL NARE NASAL CANNULAE FEMALE LUER CON 7FT O2 TUBE

## (undated) DEVICE — LINE MNTR ADLT SET O2 INTMD

## (undated) DEVICE — TRAP POLYP W/ 2 SPEC TY CLR MAGNIFYING WIND

## (undated) DEVICE — GOWN SURG AERO BLUE PERF XLG

## (undated) NOTE — LETTER
2021      [address]        [place] [address]         To whom it may concern regarding the above named:    I am the primary care provider for Ms. Elías Reed. Tavon.   She is under my car

## (undated) NOTE — MR AVS SNAPSHOT
[facility]  [address] [address] [phone]               Thank you for choosing us for your health care visit with Yesika Ny MD.  We are glad to serve you and happy to provide you with this summary of your It is the patient's responsibility to check with and follow their insurance company's guidelines for prior authorization for this test.  You may be held responsible for payment in full if proper authorization is not acquired.   Please contact the Patient Bu 5 years, or colonoscopy every 10 years, or double-contrast barium enema every 5 years; yearly fecal occult blood test or fecal immunochemical test; or a stool DNA test as often as your health care provider advises; talk with your health care provider about Haemophilus influenzaeType B (HIB) Women at increased risk for infection – talk with your healthcare provider 1 to 3 doses   Influenza (flu) All women in this age group Once a year   Measles, mumps, rubella (MMR) Women in this age group through their late [address]36. All rights reserved. This information is not intended as a substitute for professional medical care. Always follow your healthcare professional's instructions.              Allergies as of Mar 18, 2017     No Known Allergies                Today's Vi Commonly known as:  AMBIEN                Where to Get Your Medications      These medications were sent to [address] [place] [address], [address] [phone], [phone]  [address] [facility], [facility]     Phone:  [phone]

## (undated) NOTE — ED AVS SNAPSHOT
Clary Salinas   MRN: [id number]    Department:  [facility] Emergency Department in [place]   Date of Visit:  2/11/2019           Disclosure     Insurance plans vary and the physician(s) referred by the ER may not be covered by your plan.  Please contac tell this physician (or your personal doctor if your instructions are to return to your personal doctor) about any new or lasting problems. The primary care or specialist physician will see patients referred from the [facility] Emergency Department.  Missy Nguyen

## (undated) NOTE — LETTER
3/4/2021      [address]        [facility] [place] [address]         To whom it may concern or the supervisor of the above named:    I am the primary care provider for Ms. Callum Morales.   She has be

## (undated) NOTE — MR AVS SNAPSHOT
[facility]  [address], [address]  [phone]               Thank you for choosing us for your health care visit with Aung Finney MD.  We are glad to serve you and happy to provide you with this summary Take 1 tablet (150 mg total) by mouth once.  May repeat in 72 hours if symptoms persist   Commonly known as:  DIFLUCAN           Pantoprazole Sodium 40 MG Tbec   Take 1 tablet (40 mg total) by mouth every morning before breakfast.   Commonly known as:  [email or web address]

## (undated) NOTE — LETTER
8/26/2021      REGARDING:        [address]        [place] [address]         To whom it may concern of the medical section regarding the above named patient's employer:     This is a narrative that is been written in

## (undated) NOTE — LETTER
2/3/2023          To Whom It May Concern:    Galileo Jensen is currently under my medical care. She may return to work on 2/3/2023 without restrictions. If you require additional information please contact our office. Sincerely,    Gil Hunt. Jenni Amador MD          Document generated by: Charis Amador MD

## (undated) NOTE — LETTER
10/4/2019          To Whom It May Concern:    Kaiden Goode is currently under my medical care and may not return to work at this time. Please excuse  [place] for 2 days. She may return to work on 10/06/2019.   Activity is restricted as follows:

## (undated) NOTE — LETTER
[facility]  [address], [place], [place]    Authorization for Surgical Operation and Procedure                               I hereby authorize Eren Dickinson MD, my physician and his/her assistants (if applicable), which may include medical students, residents, and/or fellows, to perform the following surgical operation/ procedure and administer such anesthesia as may be determined necessary by my physician: Operation/Procedure name (s) ESOPHAGOGASTRODUODENOSCOPY on Angi Sarkar   2.   I recognize that during the surgical operation/procedure, unforeseen conditions may necessitate additional or different procedures than those listed above.  I, therefore, further authorize and request that the above-named surgeon, assistants, or designees perform such procedures as are, in their judgment, necessary and desirable.    3.   My surgeon/physician has discussed prior to my surgery the potential benefits, risks and side effects of this procedure; the likelihood of achieving goals; and potential problems that might occur during recuperation.  They also discussed reasonable alternatives to the procedure, including risks, benefits, and side effects related to the alternatives and risks related to not receiving this procedure.  I have had all my questions answered and I acknowledge that no guarantee has been made as to the result that may be obtained.    4.   Should the need arise during my operation/procedure, which includes change of level of care prior to discharge, I also consent to the administration of blood and/or blood products.  Further, I understand that despite careful testing and screening of blood or blood products by collecting agencies, I may still be subject to ill effects as a result of receiving a blood transfusion and/or blood products.  The following are some, but not all, of the potential risks that can occur: fever and allergic reactions, hemolytic reactions, transmission  of diseases such as Hepatitis, AIDS and Cytomegalovirus (CMV) and fluid overload.  In the event that I wish to have an autologous transfusion of my own blood, or a directed donor transfusion, I will discuss this with my physician.  Check only if Refusing Blood or Blood Products  I understand refusal of blood or blood products as deemed necessary by my physician may have serious consequences to my condition to include possible death. I hereby assume responsibility for my refusal and release the hospital, its personnel, and my physicians from any responsibility for the consequences of my refusal.    o  Refuse   5.   I authorize the use of any specimen, organs, tissues, body parts or foreign objects that may be removed from my body during the operation/procedure for diagnosis, research or teaching purposes and their subsequent disposal by hospital authorities.  I also authorize the release of specimen test results and/or written reports to my treating physician on the hospital medical staff or other referring or consulting physicians involved in my care, at the discretion of the Pathologist or my treating physician.    6.   I consent to the photographing or videotaping of the operations or procedures to be performed, including appropriate portions of my body for medical, scientific, or educational purposes, provided my identity is not revealed by the pictures or by descriptive texts accompanying them.  If the procedure has been photographed/videotaped, the surgeon will obtain the original picture, image, videotape or CD.  The hospital will not be responsible for storage, release or maintenance of the picture, image, tape or CD.    7.   I consent to the presence of a  or observers in the operating room as deemed necessary by my physician or their designees.    8.   I recognize that in the event my procedure results in extended X-Ray/fluoroscopy time, I may develop a skin reaction.    9. If I have a Do  Not Attempt Resuscitation (DNAR) order in place, that status will be suspended while in the operating room, procedural suite, and during the recovery period unless otherwise explicitly stated by me (or a person authorized to consent on my behalf). The surgeon or my attending physician will determine when the applicable recovery period ends for purposes of reinstating the DNAR order.  10. Patients having a sterilization procedure: I understand that if the procedure is successful the results will be permanent and it will therefore be impossible for me to inseminate, conceive, or bear children.  I also understand that the procedure is intended to result in sterility, although the result has not been guaranteed.   11. I acknowledge that my physician has explained sedation/analgesia administration to me including the risk and benefits I consent to the administration of sedation/analgesia as may be necessary or desirable in the judgment of my physician.    I CERTIFY THAT I HAVE READ AND FULLY UNDERSTAND THE ABOVE CONSENT TO OPERATION and/or OTHER PROCEDURE.     ____________________________________  _________________________________        ______________________________  Signature of Patient    Signature of Responsible Person                Printed Name of Responsible Person                                      ____________________________________  _____________________________                ________________________________  Signature of Witness        Date  Time         Relationship to Patient    STATEMENT OF PHYSICIAN My signature below affirms that prior to the time of the procedure; I have explained to the patient and/or his/her legal representative, the risks and benefits involved in the proposed treatment and any reasonable alternative to the proposed treatment. I have also explained the risks and benefits involved in refusal of the proposed treatment and alternatives to the proposed treatment and have answered the  patient's questions. If I have a significant financial interest in a co-management agreement or a significant financial interest in any product or implant, or other significant relationship used in this procedure/surgery, I have disclosed this and had a discussion with my patient.     _____________________________________________________              _____________________________  (Signature of Physician)                                                                                         (Date)                                   (Time)  Patient Name: Angi GEORGE Grayson Vails      : 1965      Printed: 2024     Medical Record #: [id number]                                      Page 1 of 1

## (undated) NOTE — LETTER
[facility]  Administration of Anesthesia  I Angi Sarkar agree to be cared for by a physician anesthesiologist alone and/or with a nurse anesthetist, who is specially trained to monitor me and give me medicine to put me to sleep or keep me comfortable during my procedure    I understand that my anesthesiologist and/or anesthetist is not an employee or agent of [facility] or [employer]. He or she works for [facility] [employer]    As the patient asking for anesthesia services, I agree to:  Allow the anesthesiologist (anesthesia doctor) to give me medicine and do additional procedures as necessary. Some examples are: Starting or using an “IV” to give me medicine, fluids or blood during my procedure, and having a breathing tube placed to help me breathe when I’m asleep (intubation). In the event that my heart stops working properly, I understand that my anesthesiologist will make every effort to sustain my life, unless otherwise directed by [facility] Do Not Resuscitate documents.  Tell my anesthesia doctor before my procedure:  If I am pregnant.  The last time that I ate or drank.  iii. All of the medicines I take (including prescriptions, herbal supplements, and pills I can buy without a prescription (including street drugs/illegal medications). Failure to inform my anesthesiologist about these medicines may increase my risk of anesthetic complications.  iv.If I am allergic to anything or have had a reaction to anesthesia before.  I understand how the anesthesia medicine will help me (benefits).  I understand that with any type of anesthesia medicine there are risks:  The most common risks are: nausea, vomiting, sore throat, muscle soreness, damage to my eyes, mouth, or teeth (from breathing tube placement).  Rare risks include: remembering what happened during my procedure, allergic reactions to medications, injury to my airway, heart, lungs, vision, nerves,  or muscles and in extremely rare instances death.  My doctor has explained to me other choices available to me for my care (alternatives).  Pregnant Patients (“epidural”):  I understand that the risks of having an epidural (medicine given into my back to help control pain during labor), include itching, low blood pressure, difficulty urinating, headache or slowing of the baby’s heart. Very rare risks include infection, bleeding, seizure, irregular heart rhythms and nerve injury.  Regional Anesthesia (“spinal”, “epidural”, & “nerve blocks”):  I understand that rare but potential complications include headache, bleeding, infection, seizure, irregular heart rhythms, and nerve injury.    _____________________________________________________________________________  Patient (or Representative) Signature/Relationship to Patient  Date   Time    _____________________________________________________________________________   Name (if used)    Language/Organization   Time    _____________________________________________________________________________  Nurse Anesthetist Signature     Date   Time  _____________________________________________________________________________  Anesthesiologist Signature     Date   Time  I have discussed the procedure and information above with the patient (or patient’s representative) and answered their questions. The patient or their representative has agreed to have anesthesia services.    _____________________________________________________________________________  Witness        Date   Time  I have verified that the signature is that of the patient or patient’s representative, and that it was signed before the procedure  Patient Name: Angi Galicia Antonina     : 1965                 Printed: 2025 at 6:37 AM    Medical Record #: [id number]                                            Page 1 of 1  ----------ANESTHESIA CONSENT----------

## (undated) NOTE — LETTER
10/12/2021        To Whom It May Concern:    Demario Escamilla is under my medical care and has failed multiple medications for management of her chronic reflux symptoms include omeprazole, famotidine, and pantoprazole.  She had had relief with Lam[facility] Reba

## (undated) NOTE — ED AVS SNAPSHOT
Isamar Hutchins   MRN: [id number]    Department:  [facility]   Date of Visit:  5/14/2019           Disclosure     Insurance plans vary and the physician(s) referred by the ER may not be covered by your plan.  Please contact CARE PHYSICIAN AT ONCE OR RETURN IMMEDIATELY TO THE EMERGENCY DEPARTMENT. If you have been prescribed any medication(s), please fill your prescription right away and begin taking the medication(s) as directed.   If you believe that any of the medications

## (undated) NOTE — LETTER
03/29/21        [facility] [address]      Dear [place] records indicate that you have outstanding lab work and or testing that was ordered for you and has not yet been completed:     [address] [facility].

## (undated) NOTE — LETTER
8/31/2020              [facility] [address]        [address] [address]         Eli Mazariegos,    I wanted to get back to you with your colonoscopy results. You had one colon polyp removed which was benign.   I would advise a repeat colono

## (undated) NOTE — LETTER
3/10/2021      [facility] [address]         To whom it may concern and the medical insurance carrier for the above named:    With all due respect and on the highest level of tarai

## (undated) NOTE — LETTER
[facility]  Administration of Anesthesia  I Angi Sarkar agree to be cared for by a physician anesthesiologist alone and/or with a nurse anesthetist, who is specially trained to monitor me and give me medicine to put me to sleep or keep me comfortable during my procedure    I understand that my anesthesiologist and/or anesthetist is not an employee or agent of [facility] or [employer]. He or she works for [facility] [employer]    As the patient asking for anesthesia services, I agree to:  Allow the anesthesiologist (anesthesia doctor) to give me medicine and do additional procedures as necessary. Some examples are: Starting or using an “IV” to give me medicine, fluids or blood during my procedure, and having a breathing tube placed to help me breathe when I’m asleep (intubation). In the event that my heart stops working properly, I understand that my anesthesiologist will make every effort to sustain my life, unless otherwise directed by [facility] Do Not Resuscitate documents.  Tell my anesthesia doctor before my procedure:  If I am pregnant.  The last time that I ate or drank.  iii. All of the medicines I take (including prescriptions, herbal supplements, and pills I can buy without a prescription (including street drugs/illegal medications). Failure to inform my anesthesiologist about these medicines may increase my risk of anesthetic complications.  iv.If I am allergic to anything or have had a reaction to anesthesia before.  I understand how the anesthesia medicine will help me (benefits).  I understand that with any type of anesthesia medicine there are risks:  The most common risks are: nausea, vomiting, sore throat, muscle soreness, damage to my eyes, mouth, or teeth (from breathing tube placement).  Rare risks include: remembering what happened during my procedure, allergic reactions to medications, injury to my airway, heart, lungs, vision, nerves,  or muscles and in extremely rare instances death.  My doctor has explained to me other choices available to me for my care (alternatives).  Pregnant Patients (“epidural”):  I understand that the risks of having an epidural (medicine given into my back to help control pain during labor), include itching, low blood pressure, difficulty urinating, headache or slowing of the baby’s heart. Very rare risks include infection, bleeding, seizure, irregular heart rhythms and nerve injury.  Regional Anesthesia (“spinal”, “epidural”, & “nerve blocks”):  I understand that rare but potential complications include headache, bleeding, infection, seizure, irregular heart rhythms, and nerve injury.    _____________________________________________________________________________  Patient (or Representative) Signature/Relationship to Patient  Date   Time    _____________________________________________________________________________   Name (if used)    Language/Organization   Time    _____________________________________________________________________________  Nurse Anesthetist Signature     Date   Time  _____________________________________________________________________________  Anesthesiologist Signature     Date   Time  I have discussed the procedure and information above with the patient (or patient’s representative) and answered their questions. The patient or their representative has agreed to have anesthesia services.    _____________________________________________________________________________  Witness        Date   Time  I have verified that the signature is that of the patient or patient’s representative, and that it was signed before the procedure  Patient Name: Angi Galicia Antonina     : 1965                 Printed: 2024 at 2:38 PM    Medical Record #: [id number]                                            Page 1 of 1  ----------ANESTHESIA CONSENT----------

## (undated) NOTE — LETTER
2/9/2023          To Whom It May Concern:    Jennifer Vázquez is currently under my medical care with a viral illness and may not return to work at this time. Please excuse Amelia Johansen for 5 days. She may return to work on 02/14/2023. Activity is restricted as follows: None. If you require additional information please contact our office.         Sincerely,    Gerhard Ch MD

## (undated) NOTE — LETTER
[facility]  [address], [place], [place]    Authorization for Surgical Operation and Procedure                               I hereby authorize Eren Dickinson MD, my physician and his/her assistants (if applicable), which may include medical students, residents, and/or fellows, to perform the following surgical operation/ procedure and administer such anesthesia as may be determined necessary by my physician: Operation/Procedure name (s) COLONOSCOPY on Angi Sarkar   2.   I recognize that during the surgical operation/procedure, unforeseen conditions may necessitate additional or different procedures than those listed above.  I, therefore, further authorize and request that the above-named surgeon, assistants, or designees perform such procedures as are, in their judgment, necessary and desirable.    3.   My surgeon/physician has discussed prior to my surgery the potential benefits, risks and side effects of this procedure; the likelihood of achieving goals; and potential problems that might occur during recuperation.  They also discussed reasonable alternatives to the procedure, including risks, benefits, and side effects related to the alternatives and risks related to not receiving this procedure.  I have had all my questions answered and I acknowledge that no guarantee has been made as to the result that may be obtained.    4.   Should the need arise during my operation/procedure, which includes change of level of care prior to discharge, I also consent to the administration of blood and/or blood products.  Further, I understand that despite careful testing and screening of blood or blood products by collecting agencies, I may still be subject to ill effects as a result of receiving a blood transfusion and/or blood products.  The following are some, but not all, of the potential risks that can occur: fever and allergic reactions, hemolytic reactions, transmission of diseases  such as Hepatitis, AIDS and Cytomegalovirus (CMV) and fluid overload.  In the event that I wish to have an autologous transfusion of my own blood, or a directed donor transfusion, I will discuss this with my physician.  Check only if Refusing Blood or Blood Products  I understand refusal of blood or blood products as deemed necessary by my physician may have serious consequences to my condition to include possible death. I hereby assume responsibility for my refusal and release the hospital, its personnel, and my physicians from any responsibility for the consequences of my refusal.    o  Refuse   5.   I authorize the use of any specimen, organs, tissues, body parts or foreign objects that may be removed from my body during the operation/procedure for diagnosis, research or teaching purposes and their subsequent disposal by hospital authorities.  I also authorize the release of specimen test results and/or written reports to my treating physician on the hospital medical staff or other referring or consulting physicians involved in my care, at the discretion of the Pathologist or my treating physician.    6.   I consent to the photographing or videotaping of the operations or procedures to be performed, including appropriate portions of my body for medical, scientific, or educational purposes, provided my identity is not revealed by the pictures or by descriptive texts accompanying them.  If the procedure has been photographed/videotaped, the surgeon will obtain the original picture, image, videotape or CD.  The hospital will not be responsible for storage, release or maintenance of the picture, image, tape or CD.    7.   I consent to the presence of a  or observers in the operating room as deemed necessary by my physician or their designees.    8.   I recognize that in the event my procedure results in extended X-Ray/fluoroscopy time, I may develop a skin reaction.    9. If I have a Do Not Attempt  Resuscitation (DNAR) order in place, that status will be suspended while in the operating room, procedural suite, and during the recovery period unless otherwise explicitly stated by me (or a person authorized to consent on my behalf). The surgeon or my attending physician will determine when the applicable recovery period ends for purposes of reinstating the DNAR order.  10. Patients having a sterilization procedure: I understand that if the procedure is successful the results will be permanent and it will therefore be impossible for me to inseminate, conceive, or bear children.  I also understand that the procedure is intended to result in sterility, although the result has not been guaranteed.   11. I acknowledge that my physician has explained sedation/analgesia administration to me including the risk and benefits I consent to the administration of sedation/analgesia as may be necessary or desirable in the judgment of my physician.    I CERTIFY THAT I HAVE READ AND FULLY UNDERSTAND THE ABOVE CONSENT TO OPERATION and/or OTHER PROCEDURE.     ____________________________________  _________________________________        ______________________________  Signature of Patient    Signature of Responsible Person                Printed Name of Responsible Person                                      ____________________________________  _____________________________                ________________________________  Signature of Witness        Date  Time         Relationship to Patient    STATEMENT OF PHYSICIAN My signature below affirms that prior to the time of the procedure; I have explained to the patient and/or his/her legal representative, the risks and benefits involved in the proposed treatment and any reasonable alternative to the proposed treatment. I have also explained the risks and benefits involved in refusal of the proposed treatment and alternatives to the proposed treatment and have answered the patient's  questions. If I have a significant financial interest in a co-management agreement or a significant financial interest in any product or implant, or other significant relationship used in this procedure/surgery, I have disclosed this and had a discussion with my patient.     _____________________________________________________              _____________________________  (Signature of Physician)                                                                                         (Date)                                   (Time)  Patient Name: Angi GEORGE Grayson Antonina      : 1965      Printed: 2025     Medical Record #: [id number]                                      Page 1 of 1

## (undated) NOTE — LETTER
2/7/2023              [address] [place] [address]  To Whom It May Concern,      Cindy Walls is currently under my medical care. She may return to work on 2/9/2023 without restrictions. Sincerely,    Puja Martin.  Dionne Mccarty MD  PPD NURSE [facility]  [address]  [address] [facility] [address] [phone]        Document electronically generated by:  Derian Flores

## (undated) NOTE — LETTER
9/19/2020      REGARDING:        Clinton Preciado [address] [place] [id number]         To whom it may concern or the supervisor of the above named:    Please be advised that your employee Ms. Betsy Perez has been unde

## (undated) NOTE — LETTER
08/02/21        [place] [address]      Dear [facility] records indicate that you have outstanding lab work and or testing that was ordered for you and has not yet been completed:  Orders Placed This Encounter

## (undated) NOTE — LETTER
1/8/2025              Angi Galicia Antonina        [address] [facility]        [facility] [address]         Dear Angi,      I wanted to get back to you with your colonoscopy results.  You had one colon polyp removed which was benign.  I would advise a repeat colonoscopy in 5 years to make sure no new polyps are forming.       You also have internal hemorrhoids and diverticulosis.  Please stay on a high fiber diet and call with any questions.       Sincerely,    Eren Dickinson MD  [address] [place] [address]  [facility] [address]  Ph: [phone]  Fax: [phone]

## (undated) NOTE — LETTER
05/23/22        [facility] [address]      Dear Lora Agarwal records indicate that you have outstanding lab work and or testing that was ordered for you and has not yet been completed:   Veronica Santacruz (EGU=[id number])  Please call Central scheduling at [phone] to schedule this test order  To provide you with the best possible care, please complete these orders at your earliest convenience. If you have recently completed these orders please disregard this letter. If you have any questions please call the office at [phone]. .     Thank you,   Kat Rodriguez

## (undated) NOTE — LETTER
[facility], [facility], [place]  [address] [facility], [address] [place] [address]  Dept: [phone]    10/8/2018        [address] [facility]        [place] [address]             Dear Tia Fernandez records

## (undated) NOTE — LETTER
12/29/20        [address] [address]      Dear Emilie Velazquez,    [address] records indicate that you have outstanding lab work and or testing that was ordered for you and has not yet been completed:  CT scan of Abdomen/Pelvis-wu

## (undated) NOTE — LETTER
1/21/2022      REGARDING:   Claim # [id number]        [address] [place] [id number]         To whom it may concern or the direct supervision regarding the above named:    I am the primary care provider for M
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
## (undated) NOTE — LETTER
12/30/2022      To Whom It May Concern:    Juanita Grissom is currently under my medical care and may not return to work at this time. Please excuse Daryl Krause for 7 days. She may return to work on 01/03/2023. Activity is restricted as follows: none. If you require additional information please contact our office. Sincerely,    Abran Rdz.  MD [address] [facility] [place] [address] [phone]      Document generated by:  Kenny Dumont RN

## (undated) NOTE — MR AVS SNAPSHOT
After Visit Summary   4/5/2021    Katy Abrams    MRN: [id number]           Visit Information     Date & Time  4/5/2021  3:20 PM Provider  Edyta Whitmore MD [address] [place] [address], [address] [place] [address],[address], [facility]  Phon Following     Normal Orders This Visit    HPV HIGH RISK , THIN PREP COLLECTION [[id number] CUSTOM]     THINPREP PAP SMEAR B [[id number] CUSTOM]     THINPREP PAP SMEAR ONLY [[id number] CUSTOM]                 [facility] now offers Video Visits through [address] for adult and $70*       [address]   Monday – Friday  10:00 am – 10:00 pm   Saturday – Sunday  10:00 am – 4:00 pm     [address]   Monday – Friday  4:00 pm – 10:00 pm   Saturday – Sunday

## (undated) NOTE — LETTER
2/2/2018              [address] [facility]        [place] S[place] [address]         To Whom It May Concern,    Reggie Umanzor is under my medical care and was seen in the office today. Please excuse any absence due to illness.     Sincerely,